# Patient Record
Sex: MALE | Race: AMERICAN INDIAN OR ALASKA NATIVE | NOT HISPANIC OR LATINO | Employment: UNEMPLOYED | URBAN - METROPOLITAN AREA
[De-identification: names, ages, dates, MRNs, and addresses within clinical notes are randomized per-mention and may not be internally consistent; named-entity substitution may affect disease eponyms.]

---

## 2022-01-01 ENCOUNTER — APPOINTMENT (OUTPATIENT)
Dept: RADIOLOGY | Facility: MEDICAL CENTER | Age: 37
DRG: 981 | End: 2022-01-01
Attending: EMERGENCY MEDICINE
Payer: MEDICAID

## 2022-01-01 ENCOUNTER — HOSPICE ADMISSION (OUTPATIENT)
Dept: HOSPICE | Facility: HOSPICE | Age: 37
End: 2022-01-01
Payer: OTHER GOVERNMENT

## 2022-01-01 ENCOUNTER — APPOINTMENT (OUTPATIENT)
Dept: RADIOLOGY | Facility: MEDICAL CENTER | Age: 37
DRG: 981 | End: 2022-01-01
Attending: INTERNAL MEDICINE
Payer: MEDICAID

## 2022-01-01 ENCOUNTER — APPOINTMENT (OUTPATIENT)
Dept: RADIOLOGY | Facility: MEDICAL CENTER | Age: 37
DRG: 981 | End: 2022-01-01
Attending: SURGERY
Payer: MEDICAID

## 2022-01-01 ENCOUNTER — HOME CARE VISIT (OUTPATIENT)
Dept: HOSPICE | Facility: HOSPICE | Age: 37
End: 2022-01-01
Payer: OTHER GOVERNMENT

## 2022-01-01 ENCOUNTER — APPOINTMENT (OUTPATIENT)
Dept: RADIOLOGY | Facility: MEDICAL CENTER | Age: 37
DRG: 981 | End: 2022-01-01
Attending: STUDENT IN AN ORGANIZED HEALTH CARE EDUCATION/TRAINING PROGRAM
Payer: MEDICAID

## 2022-01-01 ENCOUNTER — HOSPITAL ENCOUNTER (INPATIENT)
Facility: MEDICAL CENTER | Age: 37
LOS: 8 days | DRG: 981 | End: 2022-03-01
Attending: EMERGENCY MEDICINE | Admitting: INTERNAL MEDICINE
Payer: MEDICAID

## 2022-01-01 VITALS
HEIGHT: 72 IN | BODY MASS INDEX: 42.66 KG/M2 | OXYGEN SATURATION: 85 % | SYSTOLIC BLOOD PRESSURE: 109 MMHG | WEIGHT: 315 LBS | RESPIRATION RATE: 29 BRPM | HEART RATE: 94 BPM | TEMPERATURE: 97.3 F | DIASTOLIC BLOOD PRESSURE: 48 MMHG

## 2022-01-01 DIAGNOSIS — F10.20 ALCOHOLISM (HCC): ICD-10-CM

## 2022-01-01 DIAGNOSIS — E87.29 INCREASED ANION GAP METABOLIC ACIDOSIS: ICD-10-CM

## 2022-01-01 DIAGNOSIS — D64.9 ANEMIA, UNSPECIFIED TYPE: ICD-10-CM

## 2022-01-01 DIAGNOSIS — E80.6 HYPERBILIRUBINEMIA: ICD-10-CM

## 2022-01-01 DIAGNOSIS — N17.9 ACUTE RENAL FAILURE, UNSPECIFIED ACUTE RENAL FAILURE TYPE (HCC): ICD-10-CM

## 2022-01-01 DIAGNOSIS — N17.0 ACUTE RENAL FAILURE WITH TUBULAR NECROSIS (HCC): ICD-10-CM

## 2022-01-01 DIAGNOSIS — D72.829 LEUKOCYTOSIS, UNSPECIFIED TYPE: ICD-10-CM

## 2022-01-01 DIAGNOSIS — K74.60 DECOMPENSATED HEPATIC CIRRHOSIS (HCC): ICD-10-CM

## 2022-01-01 DIAGNOSIS — R74.01 ELEVATED TRANSAMINASE LEVEL: ICD-10-CM

## 2022-01-01 DIAGNOSIS — K76.82 HEPATIC ENCEPHALOPATHY (HCC): ICD-10-CM

## 2022-01-01 DIAGNOSIS — K92.1 GASTROINTESTINAL HEMORRHAGE WITH MELENA: ICD-10-CM

## 2022-01-01 DIAGNOSIS — F10.10 ALCOHOL ABUSE: ICD-10-CM

## 2022-01-01 DIAGNOSIS — K72.90 DECOMPENSATED HEPATIC CIRRHOSIS (HCC): ICD-10-CM

## 2022-01-01 LAB
ABO + RH BLD: NORMAL
ABO GROUP BLD: NORMAL
ALBUMIN SERPL BCP-MCNC: 2 G/DL (ref 3.2–4.9)
ALBUMIN SERPL BCP-MCNC: 2.1 G/DL (ref 3.2–4.9)
ALBUMIN SERPL BCP-MCNC: 2.3 G/DL (ref 3.2–4.9)
ALBUMIN SERPL BCP-MCNC: 2.6 G/DL (ref 3.2–4.9)
ALBUMIN SERPL BCP-MCNC: 2.7 G/DL (ref 3.2–4.9)
ALBUMIN SERPL BCP-MCNC: 2.7 G/DL (ref 3.2–4.9)
ALBUMIN SERPL BCP-MCNC: 2.8 G/DL (ref 3.2–4.9)
ALBUMIN SERPL BCP-MCNC: 3.2 G/DL (ref 3.2–4.9)
ALBUMIN SERPL BCP-MCNC: 3.3 G/DL (ref 3.2–4.9)
ALBUMIN/GLOB SERPL: 0.5 G/DL
ALBUMIN/GLOB SERPL: 0.5 G/DL
ALBUMIN/GLOB SERPL: 0.6 G/DL
ALBUMIN/GLOB SERPL: 0.7 G/DL
ALBUMIN/GLOB SERPL: 0.8 G/DL
ALBUMIN/GLOB SERPL: 0.9 G/DL
ALBUMIN/GLOB SERPL: 1.1 G/DL
ALP SERPL-CCNC: 148 U/L (ref 30–99)
ALP SERPL-CCNC: 158 U/L (ref 30–99)
ALP SERPL-CCNC: 161 U/L (ref 30–99)
ALP SERPL-CCNC: 162 U/L (ref 30–99)
ALP SERPL-CCNC: 175 U/L (ref 30–99)
ALP SERPL-CCNC: 220 U/L (ref 30–99)
ALP SERPL-CCNC: 229 U/L (ref 30–99)
ALT SERPL-CCNC: 50 U/L (ref 2–50)
ALT SERPL-CCNC: 59 U/L (ref 2–50)
ALT SERPL-CCNC: 70 U/L (ref 2–50)
ALT SERPL-CCNC: 71 U/L (ref 2–50)
ALT SERPL-CCNC: 73 U/L (ref 2–50)
ALT SERPL-CCNC: 75 U/L (ref 2–50)
ALT SERPL-CCNC: 75 U/L (ref 2–50)
ALT SERPL-CCNC: 79 U/L (ref 2–50)
ALT SERPL-CCNC: 97 U/L (ref 2–50)
AMMONIA PLAS-SCNC: 151 UMOL/L (ref 11–45)
AMORPH CRY #/AREA URNS HPF: PRESENT /HPF
ANION GAP SERPL CALC-SCNC: 15 MMOL/L (ref 7–16)
ANION GAP SERPL CALC-SCNC: 16 MMOL/L (ref 7–16)
ANION GAP SERPL CALC-SCNC: 16 MMOL/L (ref 7–16)
ANION GAP SERPL CALC-SCNC: 17 MMOL/L (ref 7–16)
ANION GAP SERPL CALC-SCNC: 17 MMOL/L (ref 7–16)
ANION GAP SERPL CALC-SCNC: 18 MMOL/L (ref 7–16)
ANION GAP SERPL CALC-SCNC: 18 MMOL/L (ref 7–16)
ANION GAP SERPL CALC-SCNC: 19 MMOL/L (ref 7–16)
ANION GAP SERPL CALC-SCNC: 19 MMOL/L (ref 7–16)
ANISOCYTOSIS BLD QL SMEAR: ABNORMAL
APPEARANCE UR: ABNORMAL
AST SERPL-CCNC: 114 U/L (ref 12–45)
AST SERPL-CCNC: 116 U/L (ref 12–45)
AST SERPL-CCNC: 135 U/L (ref 12–45)
AST SERPL-CCNC: 152 U/L (ref 12–45)
AST SERPL-CCNC: 153 U/L (ref 12–45)
AST SERPL-CCNC: 229 U/L (ref 12–45)
AST SERPL-CCNC: 229 U/L (ref 12–45)
AST SERPL-CCNC: 261 U/L (ref 12–45)
AST SERPL-CCNC: 288 U/L (ref 12–45)
BACTERIA #/AREA URNS HPF: NEGATIVE /HPF
BACTERIA BLD CULT: NORMAL
BACTERIA BLD CULT: NORMAL
BARCODED ABORH UBTYP: 1700
BARCODED ABORH UBTYP: 6200
BARCODED ABORH UBTYP: 6200
BARCODED ABORH UBTYP: 7300
BARCODED ABORH UBTYP: 8400
BARCODED ABORH UBTYP: 8400
BARCODED PRD CODE UBPRD: NORMAL
BARCODED UNIT NUM UBUNT: NORMAL
BASE EXCESS BLDA CALC-SCNC: -1 MMOL/L (ref -4–3)
BASE EXCESS BLDA CALC-SCNC: -2 MMOL/L (ref -4–3)
BASE EXCESS BLDA CALC-SCNC: -4 MMOL/L (ref -4–3)
BASE EXCESS BLDA CALC-SCNC: -7 MMOL/L (ref -4–3)
BASE EXCESS BLDA CALC-SCNC: 2 MMOL/L (ref -4–3)
BASE EXCESS BLDA CALC-SCNC: 3 MMOL/L (ref -4–3)
BASOPHILS # BLD AUTO: 0 % (ref 0–1.8)
BASOPHILS # BLD AUTO: 0.2 % (ref 0–1.8)
BASOPHILS # BLD AUTO: 0.3 % (ref 0–1.8)
BASOPHILS # BLD AUTO: 0.4 % (ref 0–1.8)
BASOPHILS # BLD AUTO: 0.5 % (ref 0–1.8)
BASOPHILS # BLD AUTO: 0.7 % (ref 0–1.8)
BASOPHILS # BLD AUTO: 0.9 % (ref 0–1.8)
BASOPHILS # BLD: 0 K/UL (ref 0–0.12)
BASOPHILS # BLD: 0.04 K/UL (ref 0–0.12)
BASOPHILS # BLD: 0.07 K/UL (ref 0–0.12)
BASOPHILS # BLD: 0.1 K/UL (ref 0–0.12)
BASOPHILS # BLD: 0.12 K/UL (ref 0–0.12)
BASOPHILS # BLD: 0.17 K/UL (ref 0–0.12)
BASOPHILS # BLD: 0.39 K/UL (ref 0–0.12)
BILIRUB SERPL-MCNC: 18.6 MG/DL (ref 0.1–1.5)
BILIRUB SERPL-MCNC: 19.2 MG/DL (ref 0.1–1.5)
BILIRUB SERPL-MCNC: 19.4 MG/DL (ref 0.1–1.5)
BILIRUB SERPL-MCNC: 22.2 MG/DL (ref 0.1–1.5)
BILIRUB SERPL-MCNC: 24.4 MG/DL (ref 0.1–1.5)
BILIRUB SERPL-MCNC: 26.4 MG/DL (ref 0.1–1.5)
BILIRUB SERPL-MCNC: 27 MG/DL (ref 0.1–1.5)
BILIRUB SERPL-MCNC: 31.2 MG/DL (ref 0.1–1.5)
BILIRUB SERPL-MCNC: 31.6 MG/DL (ref 0.1–1.5)
BILIRUB UR QL STRIP.AUTO: ABNORMAL
BLD GP AB SCN SERPL QL: NORMAL
BODY TEMPERATURE: ABNORMAL DEGREES
BREATHS SETTING VENT: 22
BREATHS SETTING VENT: 24
BUN SERPL-MCNC: 102 MG/DL (ref 8–22)
BUN SERPL-MCNC: 57 MG/DL (ref 8–22)
BUN SERPL-MCNC: 61 MG/DL (ref 8–22)
BUN SERPL-MCNC: 61 MG/DL (ref 8–22)
BUN SERPL-MCNC: 67 MG/DL (ref 8–22)
BUN SERPL-MCNC: 74 MG/DL (ref 8–22)
BUN SERPL-MCNC: 75 MG/DL (ref 8–22)
BUN SERPL-MCNC: 84 MG/DL (ref 8–22)
BUN SERPL-MCNC: 91 MG/DL (ref 8–22)
BUN SERPL-MCNC: 92 MG/DL (ref 8–22)
BUN SERPL-MCNC: 95 MG/DL (ref 8–22)
BUN SERPL-MCNC: 99 MG/DL (ref 8–22)
BURR CELLS BLD QL SMEAR: NORMAL
CA-I SERPL-SCNC: 1 MMOL/L (ref 1.1–1.3)
CALCIUM SERPL-MCNC: 8.1 MG/DL (ref 8.5–10.5)
CALCIUM SERPL-MCNC: 8.3 MG/DL (ref 8.5–10.5)
CALCIUM SERPL-MCNC: 8.4 MG/DL (ref 8.5–10.5)
CALCIUM SERPL-MCNC: 8.4 MG/DL (ref 8.5–10.5)
CALCIUM SERPL-MCNC: 8.6 MG/DL (ref 8.5–10.5)
CALCIUM SERPL-MCNC: 8.7 MG/DL (ref 8.5–10.5)
CALCIUM SERPL-MCNC: 8.8 MG/DL (ref 8.5–10.5)
CALCIUM SERPL-MCNC: 8.9 MG/DL (ref 8.5–10.5)
CFT BLD TEG: 12.5 MIN (ref 4.6–9.1)
CFT BLD TEG: 13.2 MIN (ref 4.6–9.1)
CFT BLD TEG: 13.7 MIN (ref 4.6–9.1)
CFT BLD TEG: 9.2 MIN (ref 4.6–9.1)
CFT BLD TEG: >17 MIN (ref 4.6–9.1)
CFT P HPASE BLD TEG: 10.8 MIN (ref 4.3–8.3)
CFT P HPASE BLD TEG: 12.6 MIN (ref 4.3–8.3)
CFT P HPASE BLD TEG: 12.7 MIN (ref 4.3–8.3)
CFT P HPASE BLD TEG: 13.7 MIN (ref 4.3–8.3)
CFT P HPASE BLD TEG: 14.5 MIN (ref 4.3–8.3)
CHLORIDE SERPL-SCNC: 100 MMOL/L (ref 96–112)
CHLORIDE SERPL-SCNC: 101 MMOL/L (ref 96–112)
CHLORIDE SERPL-SCNC: 101 MMOL/L (ref 96–112)
CHLORIDE SERPL-SCNC: 93 MMOL/L (ref 96–112)
CHLORIDE SERPL-SCNC: 94 MMOL/L (ref 96–112)
CHLORIDE SERPL-SCNC: 95 MMOL/L (ref 96–112)
CHLORIDE SERPL-SCNC: 96 MMOL/L (ref 96–112)
CHLORIDE SERPL-SCNC: 98 MMOL/L (ref 96–112)
CLOT ANGLE BLD TEG: 41 DEGREES (ref 63–78)
CLOT ANGLE BLD TEG: 72.1 DEGREES (ref 63–78)
CLOT ANGLE BLD TEG: 73.3 DEGREES (ref 63–78)
CLOT ANGLE BLD TEG: 74.1 DEGREES (ref 63–78)
CLOT ANGLE BLD TEG: 76.6 DEGREES (ref 63–78)
CLOT LYSIS 30M P MA LENFR BLD TEG: 0 % (ref 0–2.6)
CLOT LYSIS 30M P MA LENFR BLD TEG: 0.2 % (ref 0–2.6)
CO2 BLDA-SCNC: 19 MMOL/L (ref 20–33)
CO2 BLDA-SCNC: 24 MMOL/L (ref 20–33)
CO2 BLDA-SCNC: 26 MMOL/L (ref 20–33)
CO2 BLDA-SCNC: 27 MMOL/L (ref 20–33)
CO2 BLDA-SCNC: 28 MMOL/L (ref 20–33)
CO2 BLDA-SCNC: 31 MMOL/L (ref 20–33)
CO2 SERPL-SCNC: 16 MMOL/L (ref 20–33)
CO2 SERPL-SCNC: 16 MMOL/L (ref 20–33)
CO2 SERPL-SCNC: 18 MMOL/L (ref 20–33)
CO2 SERPL-SCNC: 18 MMOL/L (ref 20–33)
CO2 SERPL-SCNC: 19 MMOL/L (ref 20–33)
CO2 SERPL-SCNC: 19 MMOL/L (ref 20–33)
CO2 SERPL-SCNC: 21 MMOL/L (ref 20–33)
CO2 SERPL-SCNC: 21 MMOL/L (ref 20–33)
CO2 SERPL-SCNC: 22 MMOL/L (ref 20–33)
CO2 SERPL-SCNC: 23 MMOL/L (ref 20–33)
CO2 SERPL-SCNC: 23 MMOL/L (ref 20–33)
CO2 SERPL-SCNC: 25 MMOL/L (ref 20–33)
COLOR UR: ABNORMAL
COMMENT 1642: NORMAL
COMMENT 1642: NORMAL
COMPONENT CT 8504CT: NORMAL
COMPONENT CT 8504CT: NORMAL
COMPONENT F 8504F: NORMAL
CREAT SERPL-MCNC: 4.28 MG/DL (ref 0.5–1.4)
CREAT SERPL-MCNC: 5.32 MG/DL (ref 0.5–1.4)
CREAT SERPL-MCNC: 5.35 MG/DL (ref 0.5–1.4)
CREAT SERPL-MCNC: 5.6 MG/DL (ref 0.5–1.4)
CREAT SERPL-MCNC: 5.69 MG/DL (ref 0.5–1.4)
CREAT SERPL-MCNC: 5.69 MG/DL (ref 0.5–1.4)
CREAT SERPL-MCNC: 5.7 MG/DL (ref 0.5–1.4)
CREAT SERPL-MCNC: 5.79 MG/DL (ref 0.5–1.4)
CREAT SERPL-MCNC: 6.28 MG/DL (ref 0.5–1.4)
CREAT SERPL-MCNC: 6.36 MG/DL (ref 0.5–1.4)
CREAT SERPL-MCNC: 6.52 MG/DL (ref 0.5–1.4)
CREAT SERPL-MCNC: 8.11 MG/DL (ref 0.5–1.4)
CRP SERPL HS-MCNC: 4.06 MG/DL (ref 0–0.75)
CT.EXTRINSIC BLD ROTEM: 1 MIN (ref 0.8–2.1)
CT.EXTRINSIC BLD ROTEM: 1.2 MIN (ref 0.8–2.1)
CT.EXTRINSIC BLD ROTEM: 1.3 MIN (ref 0.8–2.1)
CT.EXTRINSIC BLD ROTEM: 1.3 MIN (ref 0.8–2.1)
CT.EXTRINSIC BLD ROTEM: 2.3 MIN (ref 0.8–2.1)
DELSYS IDSYS: ABNORMAL
EOSINOPHIL # BLD AUTO: 0 K/UL (ref 0–0.51)
EOSINOPHIL # BLD AUTO: 0.01 K/UL (ref 0–0.51)
EOSINOPHIL # BLD AUTO: 0.01 K/UL (ref 0–0.51)
EOSINOPHIL # BLD AUTO: 0.03 K/UL (ref 0–0.51)
EOSINOPHIL # BLD AUTO: 0.08 K/UL (ref 0–0.51)
EOSINOPHIL # BLD AUTO: 0.1 K/UL (ref 0–0.51)
EOSINOPHIL NFR BLD: 0 % (ref 0–6.9)
EOSINOPHIL NFR BLD: 0.1 % (ref 0–6.9)
EOSINOPHIL NFR BLD: 0.4 % (ref 0–6.9)
EOSINOPHIL NFR BLD: 0.4 % (ref 0–6.9)
EPI CELLS #/AREA URNS HPF: NEGATIVE /HPF
ERYTHROCYTE [DISTWIDTH] IN BLOOD BY AUTOMATED COUNT: 67.7 FL (ref 35.9–50)
ERYTHROCYTE [DISTWIDTH] IN BLOOD BY AUTOMATED COUNT: 68.1 FL (ref 35.9–50)
ERYTHROCYTE [DISTWIDTH] IN BLOOD BY AUTOMATED COUNT: 69 FL (ref 35.9–50)
ERYTHROCYTE [DISTWIDTH] IN BLOOD BY AUTOMATED COUNT: 70.6 FL (ref 35.9–50)
ERYTHROCYTE [DISTWIDTH] IN BLOOD BY AUTOMATED COUNT: 72 FL (ref 35.9–50)
ERYTHROCYTE [DISTWIDTH] IN BLOOD BY AUTOMATED COUNT: 72.2 FL (ref 35.9–50)
ERYTHROCYTE [DISTWIDTH] IN BLOOD BY AUTOMATED COUNT: 72.6 FL (ref 35.9–50)
ERYTHROCYTE [DISTWIDTH] IN BLOOD BY AUTOMATED COUNT: 74.9 FL (ref 35.9–50)
ERYTHROCYTE [DISTWIDTH] IN BLOOD BY AUTOMATED COUNT: 76.4 FL (ref 35.9–50)
ERYTHROCYTE [DISTWIDTH] IN BLOOD BY AUTOMATED COUNT: 79.4 FL (ref 35.9–50)
ETHANOL BLD-MCNC: <10.1 MG/DL (ref 0–10)
FIBRINOGEN PPP-MCNC: 191 MG/DL (ref 215–460)
FIBRINOGEN PPP-MCNC: 200 MG/DL (ref 215–460)
GLOBULIN SER CALC-MCNC: 3.1 G/DL (ref 1.9–3.5)
GLOBULIN SER CALC-MCNC: 3.4 G/DL (ref 1.9–3.5)
GLOBULIN SER CALC-MCNC: 3.6 G/DL (ref 1.9–3.5)
GLOBULIN SER CALC-MCNC: 3.8 G/DL (ref 1.9–3.5)
GLOBULIN SER CALC-MCNC: 3.9 G/DL (ref 1.9–3.5)
GLOBULIN SER CALC-MCNC: 4 G/DL (ref 1.9–3.5)
GLOBULIN SER CALC-MCNC: 4.4 G/DL (ref 1.9–3.5)
GLUCOSE SERPL-MCNC: 102 MG/DL (ref 65–99)
GLUCOSE SERPL-MCNC: 111 MG/DL (ref 65–99)
GLUCOSE SERPL-MCNC: 114 MG/DL (ref 65–99)
GLUCOSE SERPL-MCNC: 120 MG/DL (ref 65–99)
GLUCOSE SERPL-MCNC: 131 MG/DL (ref 65–99)
GLUCOSE SERPL-MCNC: 134 MG/DL (ref 65–99)
GLUCOSE SERPL-MCNC: 135 MG/DL (ref 65–99)
GLUCOSE SERPL-MCNC: 135 MG/DL (ref 65–99)
GLUCOSE SERPL-MCNC: 148 MG/DL (ref 65–99)
GLUCOSE SERPL-MCNC: 153 MG/DL (ref 65–99)
GLUCOSE SERPL-MCNC: 87 MG/DL (ref 65–99)
GLUCOSE SERPL-MCNC: 96 MG/DL (ref 65–99)
GLUCOSE UR STRIP.AUTO-MCNC: NEGATIVE MG/DL
GRAN CASTS #/AREA URNS LPF: ABNORMAL /LPF
HAV IGM SERPL QL IA: NORMAL
HBV CORE IGM SER QL: NORMAL
HBV SURFACE AB SERPL IA-ACNC: 746 MIU/ML (ref 0–10)
HBV SURFACE AG SER QL: NORMAL
HCO3 BLDA-SCNC: 18.1 MMOL/L (ref 17–25)
HCO3 BLDA-SCNC: 22.3 MMOL/L (ref 17–25)
HCO3 BLDA-SCNC: 24.3 MMOL/L (ref 17–25)
HCO3 BLDA-SCNC: 25.3 MMOL/L (ref 17–25)
HCO3 BLDA-SCNC: 26.9 MMOL/L (ref 17–25)
HCO3 BLDA-SCNC: 29.3 MMOL/L (ref 17–25)
HCT VFR BLD AUTO: 21.8 % (ref 42–52)
HCT VFR BLD AUTO: 22.3 % (ref 42–52)
HCT VFR BLD AUTO: 23 % (ref 42–52)
HCT VFR BLD AUTO: 23 % (ref 42–52)
HCT VFR BLD AUTO: 23.6 % (ref 42–52)
HCT VFR BLD AUTO: 24 % (ref 42–52)
HCT VFR BLD AUTO: 24.4 % (ref 42–52)
HCT VFR BLD AUTO: 27.3 % (ref 42–52)
HCT VFR BLD AUTO: 28.1 % (ref 42–52)
HCT VFR BLD AUTO: 31.3 % (ref 42–52)
HCV AB SER QL: NORMAL
HGB BLD-MCNC: 10.8 G/DL (ref 14–18)
HGB BLD-MCNC: 7.3 G/DL (ref 14–18)
HGB BLD-MCNC: 7.8 G/DL (ref 14–18)
HGB BLD-MCNC: 8 G/DL (ref 14–18)
HGB BLD-MCNC: 8 G/DL (ref 14–18)
HGB BLD-MCNC: 8.1 G/DL (ref 14–18)
HGB BLD-MCNC: 8.2 G/DL (ref 14–18)
HGB BLD-MCNC: 8.6 G/DL (ref 14–18)
HGB BLD-MCNC: 9.1 G/DL (ref 14–18)
HGB BLD-MCNC: 9.1 G/DL (ref 14–18)
HGB BLD-MCNC: 9.2 G/DL (ref 14–18)
HGB BLD-MCNC: 9.3 G/DL (ref 14–18)
HGB BLD-MCNC: 9.8 G/DL (ref 14–18)
HOROWITZ INDEX BLDA+IHG-RTO: 59 MM[HG]
HOROWITZ INDEX BLDA+IHG-RTO: 68 MM[HG]
HOROWITZ INDEX BLDA+IHG-RTO: 76 MM[HG]
HOROWITZ INDEX BLDA+IHG-RTO: 80 MM[HG]
HOROWITZ INDEX BLDA+IHG-RTO: 83 MM[HG]
HOROWITZ INDEX BLDA+IHG-RTO: 88 MM[HG]
HYALINE CASTS #/AREA URNS LPF: ABNORMAL /LPF
HYPOCHROMIA BLD QL SMEAR: ABNORMAL
IMM GRANULOCYTES # BLD AUTO: 0.29 K/UL (ref 0–0.11)
IMM GRANULOCYTES # BLD AUTO: 0.3 K/UL (ref 0–0.11)
IMM GRANULOCYTES # BLD AUTO: 0.32 K/UL (ref 0–0.11)
IMM GRANULOCYTES NFR BLD AUTO: 1.1 % (ref 0–0.9)
IMM GRANULOCYTES NFR BLD AUTO: 1.2 % (ref 0–0.9)
IMM GRANULOCYTES NFR BLD AUTO: 1.3 % (ref 0–0.9)
IMM GRANULOCYTES NFR BLD AUTO: 1.3 % (ref 0–0.9)
IMM GRANULOCYTES NFR BLD AUTO: 1.4 % (ref 0–0.9)
INR PPP: 1.61 (ref 0.87–1.13)
INR PPP: 1.65 (ref 0.87–1.13)
INR PPP: 1.67 (ref 0.87–1.13)
INR PPP: 1.69 (ref 0.87–1.13)
INR PPP: 1.74 (ref 0.87–1.13)
INR PPP: 1.75 (ref 0.87–1.13)
INR PPP: 1.78 (ref 0.87–1.13)
INR PPP: 1.96 (ref 0.87–1.13)
INR PPP: 2.01 (ref 0.87–1.13)
KETONES UR STRIP.AUTO-MCNC: NEGATIVE MG/DL
LACTATE BLD-SCNC: 2.1 MMOL/L (ref 0.5–2)
LEUKOCYTE ESTERASE UR QL STRIP.AUTO: ABNORMAL
LIPASE SERPL-CCNC: 31 U/L (ref 11–82)
LYMPHOCYTES # BLD AUTO: 1.05 K/UL (ref 1–4.8)
LYMPHOCYTES # BLD AUTO: 1.24 K/UL (ref 1–4.8)
LYMPHOCYTES # BLD AUTO: 1.56 K/UL (ref 1–4.8)
LYMPHOCYTES # BLD AUTO: 1.57 K/UL (ref 1–4.8)
LYMPHOCYTES # BLD AUTO: 1.6 K/UL (ref 1–4.8)
LYMPHOCYTES # BLD AUTO: 1.65 K/UL (ref 1–4.8)
LYMPHOCYTES # BLD AUTO: 1.68 K/UL (ref 1–4.8)
LYMPHOCYTES # BLD AUTO: 2.18 K/UL (ref 1–4.8)
LYMPHOCYTES # BLD AUTO: 2.42 K/UL (ref 1–4.8)
LYMPHOCYTES # BLD AUTO: 2.56 K/UL (ref 1–4.8)
LYMPHOCYTES NFR BLD: 3.8 % (ref 22–41)
LYMPHOCYTES NFR BLD: 4.4 % (ref 22–41)
LYMPHOCYTES NFR BLD: 5.3 % (ref 22–41)
LYMPHOCYTES NFR BLD: 5.3 % (ref 22–41)
LYMPHOCYTES NFR BLD: 6 % (ref 22–41)
LYMPHOCYTES NFR BLD: 6.1 % (ref 22–41)
LYMPHOCYTES NFR BLD: 6.3 % (ref 22–41)
LYMPHOCYTES NFR BLD: 6.4 % (ref 22–41)
LYMPHOCYTES NFR BLD: 7.2 % (ref 22–41)
LYMPHOCYTES NFR BLD: 7.3 % (ref 22–41)
MACROCYTES BLD QL SMEAR: ABNORMAL
MAGNESIUM SERPL-MCNC: 2.4 MG/DL (ref 1.5–2.5)
MAGNESIUM SERPL-MCNC: 2.4 MG/DL (ref 1.5–2.5)
MAGNESIUM SERPL-MCNC: 2.5 MG/DL (ref 1.5–2.5)
MAGNESIUM SERPL-MCNC: 2.7 MG/DL (ref 1.5–2.5)
MAGNESIUM SERPL-MCNC: 2.7 MG/DL (ref 1.5–2.5)
MAGNESIUM SERPL-MCNC: 2.9 MG/DL (ref 1.5–2.5)
MAGNESIUM SERPL-MCNC: 2.9 MG/DL (ref 1.5–2.5)
MAGNESIUM SERPL-MCNC: 3.1 MG/DL (ref 1.5–2.5)
MAGNESIUM SERPL-MCNC: 3.1 MG/DL (ref 1.5–2.5)
MANUAL DIFF BLD: NORMAL
MCF BLD TEG: 66.5 MM (ref 52–69)
MCF BLD TEG: 67.5 MM (ref 52–69)
MCF BLD TEG: 68.8 MM (ref 52–69)
MCF BLD TEG: 70.3 MM (ref 52–69)
MCF BLD TEG: 71 MM (ref 52–69)
MCF.PLATELET INHIB BLD ROTEM: 34.3 MM (ref 15–32)
MCF.PLATELET INHIB BLD ROTEM: 34.7 MM (ref 15–32)
MCF.PLATELET INHIB BLD ROTEM: 37.3 MM (ref 15–32)
MCF.PLATELET INHIB BLD ROTEM: 41 MM (ref 15–32)
MCF.PLATELET INHIB BLD ROTEM: 47.2 MM (ref 15–32)
MCH RBC QN AUTO: 33.4 PG (ref 27–33)
MCH RBC QN AUTO: 33.7 PG (ref 27–33)
MCH RBC QN AUTO: 33.9 PG (ref 27–33)
MCH RBC QN AUTO: 34.1 PG (ref 27–33)
MCH RBC QN AUTO: 34.2 PG (ref 27–33)
MCH RBC QN AUTO: 34.6 PG (ref 27–33)
MCH RBC QN AUTO: 34.6 PG (ref 27–33)
MCH RBC QN AUTO: 34.8 PG (ref 27–33)
MCH RBC QN AUTO: 34.9 PG (ref 27–33)
MCH RBC QN AUTO: 35.5 PG (ref 27–33)
MCHC RBC AUTO-ENTMCNC: 33.5 G/DL (ref 33.7–35.3)
MCHC RBC AUTO-ENTMCNC: 34.1 G/DL (ref 33.7–35.3)
MCHC RBC AUTO-ENTMCNC: 34.2 G/DL (ref 33.7–35.3)
MCHC RBC AUTO-ENTMCNC: 34.3 G/DL (ref 33.7–35.3)
MCHC RBC AUTO-ENTMCNC: 34.5 G/DL (ref 33.7–35.3)
MCHC RBC AUTO-ENTMCNC: 34.8 G/DL (ref 33.7–35.3)
MCHC RBC AUTO-ENTMCNC: 34.8 G/DL (ref 33.7–35.3)
MCHC RBC AUTO-ENTMCNC: 34.9 G/DL (ref 33.7–35.3)
MCHC RBC AUTO-ENTMCNC: 35 G/DL (ref 33.7–35.3)
MCHC RBC AUTO-ENTMCNC: 35.2 G/DL (ref 33.7–35.3)
MCV RBC AUTO: 100.4 FL (ref 81.4–97.8)
MCV RBC AUTO: 101.3 FL (ref 81.4–97.8)
MCV RBC AUTO: 101.3 FL (ref 81.4–97.8)
MCV RBC AUTO: 101.4 FL (ref 81.4–97.8)
MCV RBC AUTO: 103.3 FL (ref 81.4–97.8)
MCV RBC AUTO: 96.6 FL (ref 81.4–97.8)
MCV RBC AUTO: 96.8 FL (ref 81.4–97.8)
MCV RBC AUTO: 96.9 FL (ref 81.4–97.8)
MCV RBC AUTO: 98.3 FL (ref 81.4–97.8)
MCV RBC AUTO: 99.6 FL (ref 81.4–97.8)
METAMYELOCYTES NFR BLD MANUAL: 1.8 %
MICRO URNS: ABNORMAL
MICROCYTES BLD QL SMEAR: ABNORMAL
MODE IMODE: ABNORMAL
MONOCYTES # BLD AUTO: 1.32 K/UL (ref 0–0.85)
MONOCYTES # BLD AUTO: 1.7 K/UL (ref 0–0.85)
MONOCYTES # BLD AUTO: 1.8 K/UL (ref 0–0.85)
MONOCYTES # BLD AUTO: 1.81 K/UL (ref 0–0.85)
MONOCYTES # BLD AUTO: 2.01 K/UL (ref 0–0.85)
MONOCYTES # BLD AUTO: 2.09 K/UL (ref 0–0.85)
MONOCYTES # BLD AUTO: 2.14 K/UL (ref 0–0.85)
MONOCYTES # BLD AUTO: 2.2 K/UL (ref 0–0.85)
MONOCYTES # BLD AUTO: 2.8 K/UL (ref 0–0.85)
MONOCYTES # BLD AUTO: 3.33 K/UL (ref 0–0.85)
MONOCYTES NFR BLD AUTO: 4.4 % (ref 0–13.4)
MONOCYTES NFR BLD AUTO: 4.8 % (ref 0–13.4)
MONOCYTES NFR BLD AUTO: 5.1 % (ref 0–13.4)
MONOCYTES NFR BLD AUTO: 7.1 % (ref 0–13.4)
MONOCYTES NFR BLD AUTO: 7.8 % (ref 0–13.4)
MONOCYTES NFR BLD AUTO: 7.8 % (ref 0–13.4)
MONOCYTES NFR BLD AUTO: 8.2 % (ref 0–13.4)
MONOCYTES NFR BLD AUTO: 8.2 % (ref 0–13.4)
MONOCYTES NFR BLD AUTO: 8.9 % (ref 0–13.4)
MONOCYTES NFR BLD AUTO: 9.2 % (ref 0–13.4)
MORPHOLOGY BLD-IMP: NORMAL
MYELOCYTES NFR BLD MANUAL: 0.9 %
MYELOCYTES NFR BLD MANUAL: 1.7 %
NEUTROPHILS # BLD AUTO: 18.12 K/UL (ref 1.82–7.42)
NEUTROPHILS # BLD AUTO: 19.03 K/UL (ref 1.82–7.42)
NEUTROPHILS # BLD AUTO: 19.57 K/UL (ref 1.82–7.42)
NEUTROPHILS # BLD AUTO: 20.61 K/UL (ref 1.82–7.42)
NEUTROPHILS # BLD AUTO: 21.15 K/UL (ref 1.82–7.42)
NEUTROPHILS # BLD AUTO: 22.44 K/UL (ref 1.82–7.42)
NEUTROPHILS # BLD AUTO: 28.2 K/UL (ref 1.82–7.42)
NEUTROPHILS # BLD AUTO: 36.81 K/UL (ref 1.82–7.42)
NEUTROPHILS # BLD AUTO: 39.37 K/UL (ref 1.82–7.42)
NEUTROPHILS # BLD AUTO: 40.08 K/UL (ref 1.82–7.42)
NEUTROPHILS NFR BLD: 82.1 % (ref 44–72)
NEUTROPHILS NFR BLD: 82.2 % (ref 44–72)
NEUTROPHILS NFR BLD: 82.7 % (ref 44–72)
NEUTROPHILS NFR BLD: 83.2 % (ref 44–72)
NEUTROPHILS NFR BLD: 83.6 % (ref 44–72)
NEUTROPHILS NFR BLD: 84.5 % (ref 44–72)
NEUTROPHILS NFR BLD: 86.8 % (ref 44–72)
NEUTROPHILS NFR BLD: 87.3 % (ref 44–72)
NEUTROPHILS NFR BLD: 88.5 % (ref 44–72)
NEUTROPHILS NFR BLD: 90.5 % (ref 44–72)
NEUTS BAND NFR BLD MANUAL: 0.9 % (ref 0–10)
NEUTS BAND NFR BLD MANUAL: 2.6 % (ref 0–10)
NITRITE UR QL STRIP.AUTO: POSITIVE
NRBC # BLD AUTO: 0 K/UL
NRBC # BLD AUTO: 0.02 K/UL
NRBC # BLD AUTO: 0.04 K/UL
NRBC # BLD AUTO: 0.09 K/UL
NRBC # BLD AUTO: 0.17 K/UL
NRBC # BLD AUTO: 0.36 K/UL
NRBC BLD-RTO: 0 /100 WBC
NRBC BLD-RTO: 0.1 /100 WBC
NRBC BLD-RTO: 0.1 /100 WBC
NRBC BLD-RTO: 0.2 /100 WBC
NRBC BLD-RTO: 0.4 /100 WBC
NRBC BLD-RTO: 0.8 /100 WBC
O2/TOTAL GAS SETTING VFR VENT: 100 %
O2/TOTAL GAS SETTING VFR VENT: 80 %
OVALOCYTES BLD QL SMEAR: NORMAL
PA AA BLD-ACNC: 18.1 % (ref 0–11)
PA AA BLD-ACNC: 34.4 % (ref 0–11)
PA AA BLD-ACNC: 8.1 % (ref 0–11)
PA AA BLD-ACNC: 89 % (ref 0–11)
PA AA BLD-ACNC: ABNORMAL % (ref 0–11)
PA ADP BLD-ACNC: 47.6 % (ref 0–17)
PA ADP BLD-ACNC: 69.2 % (ref 0–17)
PA ADP BLD-ACNC: 96.4 % (ref 0–17)
PA ADP BLD-ACNC: 96.4 % (ref 0–17)
PA ADP BLD-ACNC: ABNORMAL % (ref 0–17)
PCO2 BLDA: 34 MMHG (ref 26–37)
PCO2 BLDA: 43.6 MMHG (ref 26–37)
PCO2 BLDA: 44.4 MMHG (ref 26–37)
PCO2 BLDA: 45.7 MMHG (ref 26–37)
PCO2 BLDA: 49.7 MMHG (ref 26–37)
PCO2 BLDA: 51.8 MMHG (ref 26–37)
PCO2 TEMP ADJ BLDA: 43 MMHG (ref 26–37)
PCO2 TEMP ADJ BLDA: 44.1 MMHG (ref 26–37)
PCO2 TEMP ADJ BLDA: 45.3 MMHG (ref 26–37)
PCO2 TEMP ADJ BLDA: 47.8 MMHG (ref 26–37)
PCO2 TEMP ADJ BLDA: 53.2 MMHG (ref 26–37)
PEEP END EXPIRATORY PRESSURE IPEEP: 12 CMH20
PEEP END EXPIRATORY PRESSURE IPEEP: 16 CMH20
PEEP END EXPIRATORY PRESSURE IPEEP: 16 CMH20
PEEP END EXPIRATORY PRESSURE IPEEP: 18 CMH20
PH BLDA: 7.31 [PH] (ref 7.4–7.5)
PH BLDA: 7.31 [PH] (ref 7.4–7.5)
PH BLDA: 7.33 [PH] (ref 7.4–7.5)
PH BLDA: 7.33 [PH] (ref 7.4–7.5)
PH BLDA: 7.36 [PH] (ref 7.4–7.5)
PH BLDA: 7.4 [PH] (ref 7.4–7.5)
PH TEMP ADJ BLDA: 7.32 [PH] (ref 7.4–7.5)
PH TEMP ADJ BLDA: 7.33 [PH] (ref 7.4–7.5)
PH TEMP ADJ BLDA: 7.34 [PH] (ref 7.4–7.5)
PH TEMP ADJ BLDA: 7.35 [PH] (ref 7.4–7.5)
PH TEMP ADJ BLDA: 7.39 [PH] (ref 7.4–7.5)
PH UR STRIP.AUTO: 5.5 [PH] (ref 5–8)
PHOSPHATE SERPL-MCNC: 10.5 MG/DL (ref 2.5–4.5)
PHOSPHATE SERPL-MCNC: 6.9 MG/DL (ref 2.5–4.5)
PHOSPHATE SERPL-MCNC: 7.2 MG/DL (ref 2.5–4.5)
PHOSPHATE SERPL-MCNC: 7.4 MG/DL (ref 2.5–4.5)
PHOSPHATE SERPL-MCNC: 7.9 MG/DL (ref 2.5–4.5)
PHOSPHATE SERPL-MCNC: 8.1 MG/DL (ref 2.5–4.5)
PHOSPHATE SERPL-MCNC: 8.2 MG/DL (ref 2.5–4.5)
PHOSPHATE SERPL-MCNC: 8.5 MG/DL (ref 2.5–4.5)
PHOSPHATE SERPL-MCNC: 9.5 MG/DL (ref 2.5–4.5)
PLATELET # BLD AUTO: 158 K/UL (ref 164–446)
PLATELET # BLD AUTO: 197 K/UL (ref 164–446)
PLATELET # BLD AUTO: 202 K/UL (ref 164–446)
PLATELET # BLD AUTO: 221 K/UL (ref 164–446)
PLATELET # BLD AUTO: 235 K/UL (ref 164–446)
PLATELET # BLD AUTO: 240 K/UL (ref 164–446)
PLATELET # BLD AUTO: 246 K/UL (ref 164–446)
PLATELET # BLD AUTO: 250 K/UL (ref 164–446)
PLATELET # BLD AUTO: 286 K/UL (ref 164–446)
PLATELET # BLD AUTO: 300 K/UL (ref 164–446)
PLATELET BLD QL SMEAR: NORMAL
PMV BLD AUTO: 10.6 FL (ref 9–12.9)
PMV BLD AUTO: 10.9 FL (ref 9–12.9)
PMV BLD AUTO: 11.1 FL (ref 9–12.9)
PMV BLD AUTO: 11.1 FL (ref 9–12.9)
PMV BLD AUTO: 11.3 FL (ref 9–12.9)
PMV BLD AUTO: 11.5 FL (ref 9–12.9)
PMV BLD AUTO: 11.5 FL (ref 9–12.9)
PMV BLD AUTO: 11.7 FL (ref 9–12.9)
PO2 BLDA: 54 MMHG (ref 64–87)
PO2 BLDA: 59 MMHG (ref 64–87)
PO2 BLDA: 61 MMHG (ref 64–87)
PO2 BLDA: 64 MMHG (ref 64–87)
PO2 BLDA: 66 MMHG (ref 64–87)
PO2 BLDA: 70 MMHG (ref 64–87)
PO2 TEMP ADJ BLDA: 51 MMHG (ref 64–87)
PO2 TEMP ADJ BLDA: 58 MMHG (ref 64–87)
PO2 TEMP ADJ BLDA: 62 MMHG (ref 64–87)
PO2 TEMP ADJ BLDA: 65 MMHG (ref 64–87)
PO2 TEMP ADJ BLDA: 69 MMHG (ref 64–87)
POIKILOCYTOSIS BLD QL SMEAR: NORMAL
POLYCHROMASIA BLD QL SMEAR: NORMAL
POLYCHROMASIA BLD QL SMEAR: NORMAL
POTASSIUM SERPL-SCNC: 3.6 MMOL/L (ref 3.6–5.5)
POTASSIUM SERPL-SCNC: 4 MMOL/L (ref 3.6–5.5)
POTASSIUM SERPL-SCNC: 4.1 MMOL/L (ref 3.6–5.5)
POTASSIUM SERPL-SCNC: 4.1 MMOL/L (ref 3.6–5.5)
POTASSIUM SERPL-SCNC: 4.3 MMOL/L (ref 3.6–5.5)
POTASSIUM SERPL-SCNC: 4.4 MMOL/L (ref 3.6–5.5)
POTASSIUM SERPL-SCNC: 4.5 MMOL/L (ref 3.6–5.5)
POTASSIUM SERPL-SCNC: 4.6 MMOL/L (ref 3.6–5.5)
POTASSIUM SERPL-SCNC: 5 MMOL/L (ref 3.6–5.5)
POTASSIUM SERPL-SCNC: 5.3 MMOL/L (ref 3.6–5.5)
PREALB SERPL-MCNC: 5.8 MG/DL (ref 18–38)
PREALB SERPL-MCNC: 8.7 MG/DL (ref 18–38)
PROCALCITONIN SERPL-MCNC: 3.27 NG/ML
PRODUCT TYPE UPROD: NORMAL
PROMYELOCYTES NFR BLD MANUAL: 0.9 %
PROT SERPL-MCNC: 6 G/DL (ref 6–8.2)
PROT SERPL-MCNC: 6.3 G/DL (ref 6–8.2)
PROT SERPL-MCNC: 6.3 G/DL (ref 6–8.2)
PROT SERPL-MCNC: 6.4 G/DL (ref 6–8.2)
PROT SERPL-MCNC: 6.5 G/DL (ref 6–8.2)
PROT SERPL-MCNC: 6.6 G/DL (ref 6–8.2)
PROT SERPL-MCNC: 6.8 G/DL (ref 6–8.2)
PROT UR QL STRIP: 30 MG/DL
PROTHROMBIN TIME: 18.7 SEC (ref 12–14.6)
PROTHROMBIN TIME: 19 SEC (ref 12–14.6)
PROTHROMBIN TIME: 19.2 SEC (ref 12–14.6)
PROTHROMBIN TIME: 19.4 SEC (ref 12–14.6)
PROTHROMBIN TIME: 19.8 SEC (ref 12–14.6)
PROTHROMBIN TIME: 19.9 SEC (ref 12–14.6)
PROTHROMBIN TIME: 20.2 SEC (ref 12–14.6)
PROTHROMBIN TIME: 21.7 SEC (ref 12–14.6)
PROTHROMBIN TIME: 22.2 SEC (ref 12–14.6)
RBC # BLD AUTO: 2.11 M/UL (ref 4.7–6.1)
RBC # BLD AUTO: 2.2 M/UL (ref 4.7–6.1)
RBC # BLD AUTO: 2.29 M/UL (ref 4.7–6.1)
RBC # BLD AUTO: 2.33 M/UL (ref 4.7–6.1)
RBC # BLD AUTO: 2.34 M/UL (ref 4.7–6.1)
RBC # BLD AUTO: 2.37 M/UL (ref 4.7–6.1)
RBC # BLD AUTO: 2.52 M/UL (ref 4.7–6.1)
RBC # BLD AUTO: 2.74 M/UL (ref 4.7–6.1)
RBC # BLD AUTO: 2.91 M/UL (ref 4.7–6.1)
RBC # BLD AUTO: 3.23 M/UL (ref 4.7–6.1)
RBC # URNS HPF: ABNORMAL /HPF
RBC BLD AUTO: PRESENT
RBC UR QL AUTO: NEGATIVE
RH BLD: NORMAL
SAO2 % BLDA: 85 % (ref 93–99)
SAO2 % BLDA: 89 % (ref 93–99)
SAO2 % BLDA: 89 % (ref 93–99)
SAO2 % BLDA: 92 % (ref 93–99)
SCHISTOCYTES BLD QL SMEAR: NORMAL
SIGNIFICANT IND 70042: NORMAL
SIGNIFICANT IND 70042: NORMAL
SITE SITE: NORMAL
SITE SITE: NORMAL
SODIUM SERPL-SCNC: 134 MMOL/L (ref 135–145)
SODIUM SERPL-SCNC: 135 MMOL/L (ref 135–145)
SODIUM SERPL-SCNC: 136 MMOL/L (ref 135–145)
SOURCE SOURCE: NORMAL
SOURCE SOURCE: NORMAL
SP GR UR STRIP.AUTO: 1.01
SPECIMEN DRAWN FROM PATIENT: ABNORMAL
TARGETS BLD QL SMEAR: NORMAL
TEG ALGORITHM TGALG: ABNORMAL
TIDAL VOLUME IVT: 430 ML
TRIGL SERPL-MCNC: 150 MG/DL (ref 0–149)
TRIGL SERPL-MCNC: 184 MG/DL (ref 0–149)
TRIGL SERPL-MCNC: 204 MG/DL (ref 0–149)
TRIGL SERPL-MCNC: 456 MG/DL (ref 0–149)
UNIT STATUS USTAT: NORMAL
UROBILINOGEN UR STRIP.AUTO-MCNC: 0.2 MG/DL
WBC # BLD AUTO: 22 K/UL (ref 4.8–10.8)
WBC # BLD AUTO: 23.2 K/UL (ref 4.8–10.8)
WBC # BLD AUTO: 23.2 K/UL (ref 4.8–10.8)
WBC # BLD AUTO: 23.9 K/UL (ref 4.8–10.8)
WBC # BLD AUTO: 24.8 K/UL (ref 4.8–10.8)
WBC # BLD AUTO: 25.7 K/UL (ref 4.8–10.8)
WBC # BLD AUTO: 34.1 K/UL (ref 4.8–10.8)
WBC # BLD AUTO: 42.7 K/UL (ref 4.8–10.8)
WBC # BLD AUTO: 43.5 K/UL (ref 4.8–10.8)
WBC # BLD AUTO: 45.7 K/UL (ref 4.8–10.8)
WBC #/AREA URNS HPF: ABNORMAL /HPF

## 2022-01-01 PROCEDURE — 700111 HCHG RX REV CODE 636 W/ 250 OVERRIDE (IP): Performed by: INTERNAL MEDICINE

## 2022-01-01 PROCEDURE — 700102 HCHG RX REV CODE 250 W/ 637 OVERRIDE(OP): Performed by: INTERNAL MEDICINE

## 2022-01-01 PROCEDURE — 94003 VENT MGMT INPAT SUBQ DAY: CPT

## 2022-01-01 PROCEDURE — 99291 CRITICAL CARE FIRST HOUR: CPT | Mod: 25 | Performed by: STUDENT IN AN ORGANIZED HEALTH CARE EDUCATION/TRAINING PROGRAM

## 2022-01-01 PROCEDURE — 02HV33Z INSERTION OF INFUSION DEVICE INTO SUPERIOR VENA CAVA, PERCUTANEOUS APPROACH: ICD-10-PCS | Performed by: STUDENT IN AN ORGANIZED HEALTH CARE EDUCATION/TRAINING PROGRAM

## 2022-01-01 PROCEDURE — 160202 HCHG ENDO MINUTES - 1ST 30 MINS LEVEL 3: Performed by: INTERNAL MEDICINE

## 2022-01-01 PROCEDURE — 96375 TX/PRO/DX INJ NEW DRUG ADDON: CPT

## 2022-01-01 PROCEDURE — P9017 PLASMA 1 DONOR FRZ W/IN 8 HR: HCPCS

## 2022-01-01 PROCEDURE — 700105 HCHG RX REV CODE 258: Performed by: INTERNAL MEDICINE

## 2022-01-01 PROCEDURE — 94760 N-INVAS EAR/PLS OXIMETRY 1: CPT

## 2022-01-01 PROCEDURE — 302307 BAG FECAL MANAGEMENT TEMPORARY COLLECTION WITH FILTER - SEAL SIGNAL FMS: Performed by: STUDENT IN AN ORGANIZED HEALTH CARE EDUCATION/TRAINING PROGRAM

## 2022-01-01 PROCEDURE — 94002 VENT MGMT INPAT INIT DAY: CPT

## 2022-01-01 PROCEDURE — 700105 HCHG RX REV CODE 258: Performed by: STUDENT IN AN ORGANIZED HEALTH CARE EDUCATION/TRAINING PROGRAM

## 2022-01-01 PROCEDURE — 83735 ASSAY OF MAGNESIUM: CPT

## 2022-01-01 PROCEDURE — 700102 HCHG RX REV CODE 250 W/ 637 OVERRIDE(OP): Performed by: STUDENT IN AN ORGANIZED HEALTH CARE EDUCATION/TRAINING PROGRAM

## 2022-01-01 PROCEDURE — 700111 HCHG RX REV CODE 636 W/ 250 OVERRIDE (IP): Performed by: STUDENT IN AN ORGANIZED HEALTH CARE EDUCATION/TRAINING PROGRAM

## 2022-01-01 PROCEDURE — C1751 CATH, INF, PER/CENT/MIDLINE: HCPCS

## 2022-01-01 PROCEDURE — 700101 HCHG RX REV CODE 250

## 2022-01-01 PROCEDURE — C9113 INJ PANTOPRAZOLE SODIUM, VIA: HCPCS | Performed by: INTERNAL MEDICINE

## 2022-01-01 PROCEDURE — 71045 X-RAY EXAM CHEST 1 VIEW: CPT

## 2022-01-01 PROCEDURE — 700101 HCHG RX REV CODE 250: Performed by: STUDENT IN AN ORGANIZED HEALTH CARE EDUCATION/TRAINING PROGRAM

## 2022-01-01 PROCEDURE — 700111 HCHG RX REV CODE 636 W/ 250 OVERRIDE (IP)

## 2022-01-01 PROCEDURE — 90935 HEMODIALYSIS ONE EVALUATION: CPT

## 2022-01-01 PROCEDURE — 99292 CRITICAL CARE ADDL 30 MIN: CPT | Mod: 25 | Performed by: STUDENT IN AN ORGANIZED HEALTH CARE EDUCATION/TRAINING PROGRAM

## 2022-01-01 PROCEDURE — 0BH18EZ INSERTION OF ENDOTRACHEAL AIRWAY INTO TRACHEA, VIA NATURAL OR ARTIFICIAL OPENING ENDOSCOPIC: ICD-10-PCS | Performed by: STUDENT IN AN ORGANIZED HEALTH CARE EDUCATION/TRAINING PROGRAM

## 2022-01-01 PROCEDURE — 31645 BRNCHSC W/THER ASPIR 1ST: CPT | Performed by: STUDENT IN AN ORGANIZED HEALTH CARE EDUCATION/TRAINING PROGRAM

## 2022-01-01 PROCEDURE — P9012 CRYOPRECIPITATE EACH UNIT: HCPCS | Mod: 91

## 2022-01-01 PROCEDURE — 770022 HCHG ROOM/CARE - ICU (200)

## 2022-01-01 PROCEDURE — 85610 PROTHROMBIN TIME: CPT

## 2022-01-01 PROCEDURE — 85384 FIBRINOGEN ACTIVITY: CPT

## 2022-01-01 PROCEDURE — 82180 ASSAY OF ASCORBIC ACID: CPT

## 2022-01-01 PROCEDURE — 85027 COMPLETE CBC AUTOMATED: CPT

## 2022-01-01 PROCEDURE — 99292 CRITICAL CARE ADDL 30 MIN: CPT | Performed by: INTERNAL MEDICINE

## 2022-01-01 PROCEDURE — 84478 ASSAY OF TRIGLYCERIDES: CPT

## 2022-01-01 PROCEDURE — 0B968ZZ DRAINAGE OF RIGHT LOWER LOBE BRONCHUS, VIA NATURAL OR ARTIFICIAL OPENING ENDOSCOPIC: ICD-10-PCS | Performed by: STUDENT IN AN ORGANIZED HEALTH CARE EDUCATION/TRAINING PROGRAM

## 2022-01-01 PROCEDURE — 85007 BL SMEAR W/DIFF WBC COUNT: CPT

## 2022-01-01 PROCEDURE — 85025 COMPLETE CBC W/AUTO DIFF WBC: CPT

## 2022-01-01 PROCEDURE — 85347 COAGULATION TIME ACTIVATED: CPT

## 2022-01-01 PROCEDURE — 51798 US URINE CAPACITY MEASURE: CPT

## 2022-01-01 PROCEDURE — A9270 NON-COVERED ITEM OR SERVICE: HCPCS | Performed by: STUDENT IN AN ORGANIZED HEALTH CARE EDUCATION/TRAINING PROGRAM

## 2022-01-01 PROCEDURE — 84100 ASSAY OF PHOSPHORUS: CPT

## 2022-01-01 PROCEDURE — 0DJ08ZZ INSPECTION OF UPPER INTESTINAL TRACT, VIA NATURAL OR ARTIFICIAL OPENING ENDOSCOPIC: ICD-10-PCS | Performed by: INTERNAL MEDICINE

## 2022-01-01 PROCEDURE — 80053 COMPREHEN METABOLIC PANEL: CPT

## 2022-01-01 PROCEDURE — 86706 HEP B SURFACE ANTIBODY: CPT

## 2022-01-01 PROCEDURE — 36430 TRANSFUSION BLD/BLD COMPNT: CPT

## 2022-01-01 PROCEDURE — 83690 ASSAY OF LIPASE: CPT

## 2022-01-01 PROCEDURE — 96374 THER/PROPH/DIAG INJ IV PUSH: CPT

## 2022-01-01 PROCEDURE — 99291 CRITICAL CARE FIRST HOUR: CPT

## 2022-01-01 PROCEDURE — 82803 BLOOD GASES ANY COMBINATION: CPT

## 2022-01-01 PROCEDURE — 86850 RBC ANTIBODY SCREEN: CPT

## 2022-01-01 PROCEDURE — 36600 WITHDRAWAL OF ARTERIAL BLOOD: CPT

## 2022-01-01 PROCEDURE — C9113 INJ PANTOPRAZOLE SODIUM, VIA: HCPCS | Performed by: EMERGENCY MEDICINE

## 2022-01-01 PROCEDURE — 82077 ASSAY SPEC XCP UR&BREATH IA: CPT

## 2022-01-01 PROCEDURE — 700101 HCHG RX REV CODE 250: Performed by: OTOLARYNGOLOGY

## 2022-01-01 PROCEDURE — 82140 ASSAY OF AMMONIA: CPT

## 2022-01-01 PROCEDURE — 160048 HCHG OR STATISTICAL LEVEL 1-5: Performed by: INTERNAL MEDICINE

## 2022-01-01 PROCEDURE — 85576 BLOOD PLATELET AGGREGATION: CPT

## 2022-01-01 PROCEDURE — 82330 ASSAY OF CALCIUM: CPT

## 2022-01-01 PROCEDURE — 80048 BASIC METABOLIC PNL TOTAL CA: CPT | Mod: 91

## 2022-01-01 PROCEDURE — 99152 MOD SED SAME PHYS/QHP 5/>YRS: CPT

## 2022-01-01 PROCEDURE — 3E033XZ INTRODUCTION OF VASOPRESSOR INTO PERIPHERAL VEIN, PERCUTANEOUS APPROACH: ICD-10-PCS | Performed by: STUDENT IN AN ORGANIZED HEALTH CARE EDUCATION/TRAINING PROGRAM

## 2022-01-01 PROCEDURE — 80048 BASIC METABOLIC PNL TOTAL CA: CPT

## 2022-01-01 PROCEDURE — 99291 CRITICAL CARE FIRST HOUR: CPT | Performed by: STUDENT IN AN ORGANIZED HEALTH CARE EDUCATION/TRAINING PROGRAM

## 2022-01-01 PROCEDURE — 31500 INSERT EMERGENCY AIRWAY: CPT

## 2022-01-01 PROCEDURE — 31624 DX BRONCHOSCOPE/LAVAGE: CPT | Mod: 51 | Performed by: STUDENT IN AN ORGANIZED HEALTH CARE EDUCATION/TRAINING PROGRAM

## 2022-01-01 PROCEDURE — 0B9B8ZZ DRAINAGE OF LEFT LOWER LOBE BRONCHUS, VIA NATURAL OR ARTIFICIAL OPENING ENDOSCOPIC: ICD-10-PCS | Performed by: STUDENT IN AN ORGANIZED HEALTH CARE EDUCATION/TRAINING PROGRAM

## 2022-01-01 PROCEDURE — 86140 C-REACTIVE PROTEIN: CPT

## 2022-01-01 PROCEDURE — 30233M1 TRANSFUSION OF NONAUTOLOGOUS PLASMA CRYOPRECIPITATE INTO PERIPHERAL VEIN, PERCUTANEOUS APPROACH: ICD-10-PCS | Performed by: STUDENT IN AN ORGANIZED HEALTH CARE EDUCATION/TRAINING PROGRAM

## 2022-01-01 PROCEDURE — 0B9F8ZZ DRAINAGE OF RIGHT LOWER LUNG LOBE, VIA NATURAL OR ARTIFICIAL OPENING ENDOSCOPIC: ICD-10-PCS | Performed by: STUDENT IN AN ORGANIZED HEALTH CARE EDUCATION/TRAINING PROGRAM

## 2022-01-01 PROCEDURE — 87040 BLOOD CULTURE FOR BACTERIA: CPT | Mod: 91

## 2022-01-01 PROCEDURE — 302977 HCHG BRONCHOSCOPY PROC-THERAPEUTIC

## 2022-01-01 PROCEDURE — 84145 PROCALCITONIN (PCT): CPT

## 2022-01-01 PROCEDURE — 84134 ASSAY OF PREALBUMIN: CPT

## 2022-01-01 PROCEDURE — 85018 HEMOGLOBIN: CPT

## 2022-01-01 PROCEDURE — 83605 ASSAY OF LACTIC ACID: CPT

## 2022-01-01 PROCEDURE — 86901 BLOOD TYPING SEROLOGIC RH(D): CPT

## 2022-01-01 PROCEDURE — 31500 INSERT EMERGENCY AIRWAY: CPT | Performed by: STUDENT IN AN ORGANIZED HEALTH CARE EDUCATION/TRAINING PROGRAM

## 2022-01-01 PROCEDURE — 80074 ACUTE HEPATITIS PANEL: CPT

## 2022-01-01 PROCEDURE — 5A1955Z RESPIRATORY VENTILATION, GREATER THAN 96 CONSECUTIVE HOURS: ICD-10-PCS | Performed by: STUDENT IN AN ORGANIZED HEALTH CARE EDUCATION/TRAINING PROGRAM

## 2022-01-01 PROCEDURE — 99291 CRITICAL CARE FIRST HOUR: CPT | Performed by: INTERNAL MEDICINE

## 2022-01-01 PROCEDURE — A9270 NON-COVERED ITEM OR SERVICE: HCPCS | Performed by: INTERNAL MEDICINE

## 2022-01-01 PROCEDURE — 76700 US EXAM ABDOM COMPLETE: CPT

## 2022-01-01 PROCEDURE — P9047 ALBUMIN (HUMAN), 25%, 50ML: HCPCS | Mod: JG | Performed by: INTERNAL MEDICINE

## 2022-01-01 PROCEDURE — 99153 MOD SED SAME PHYS/QHP EA: CPT

## 2022-01-01 PROCEDURE — 700101 HCHG RX REV CODE 250: Performed by: INTERNAL MEDICINE

## 2022-01-01 PROCEDURE — 700111 HCHG RX REV CODE 636 W/ 250 OVERRIDE (IP): Performed by: EMERGENCY MEDICINE

## 2022-01-01 PROCEDURE — P9017 PLASMA 1 DONOR FRZ W/IN 8 HR: HCPCS | Mod: 91

## 2022-01-01 PROCEDURE — 85576 BLOOD PLATELET AGGREGATION: CPT | Mod: 91

## 2022-01-01 PROCEDURE — 5A1D70Z PERFORMANCE OF URINARY FILTRATION, INTERMITTENT, LESS THAN 6 HOURS PER DAY: ICD-10-PCS | Performed by: STUDENT IN AN ORGANIZED HEALTH CARE EDUCATION/TRAINING PROGRAM

## 2022-01-01 PROCEDURE — 700111 HCHG RX REV CODE 636 W/ 250 OVERRIDE (IP): Mod: JG | Performed by: INTERNAL MEDICINE

## 2022-01-01 PROCEDURE — 99292 CRITICAL CARE ADDL 30 MIN: CPT | Performed by: STUDENT IN AN ORGANIZED HEALTH CARE EDUCATION/TRAINING PROGRAM

## 2022-01-01 PROCEDURE — 86900 BLOOD TYPING SEROLOGIC ABO: CPT

## 2022-01-01 PROCEDURE — 302136 NUTRITION PUMP: Performed by: STUDENT IN AN ORGANIZED HEALTH CARE EDUCATION/TRAINING PROGRAM

## 2022-01-01 PROCEDURE — 700102 HCHG RX REV CODE 250 W/ 637 OVERRIDE(OP): Performed by: OTOLARYNGOLOGY

## 2022-01-01 PROCEDURE — 700111 HCHG RX REV CODE 636 W/ 250 OVERRIDE (IP): Mod: JA | Performed by: INTERNAL MEDICINE

## 2022-01-01 PROCEDURE — C1752 CATH,HEMODIALYSIS,SHORT-TERM: HCPCS

## 2022-01-01 PROCEDURE — 700105 HCHG RX REV CODE 258

## 2022-01-01 PROCEDURE — 85025 COMPLETE CBC W/AUTO DIFF WBC: CPT | Mod: 91

## 2022-01-01 PROCEDURE — 0B9C8ZZ DRAINAGE OF RIGHT UPPER LUNG LOBE, VIA NATURAL OR ARTIFICIAL OPENING ENDOSCOPIC: ICD-10-PCS | Performed by: STUDENT IN AN ORGANIZED HEALTH CARE EDUCATION/TRAINING PROGRAM

## 2022-01-01 PROCEDURE — 0WCQ8ZZ EXTIRPATION OF MATTER FROM RESPIRATORY TRACT, VIA NATURAL OR ARTIFICIAL OPENING ENDOSCOPIC: ICD-10-PCS | Performed by: STUDENT IN AN ORGANIZED HEALTH CARE EDUCATION/TRAINING PROGRAM

## 2022-01-01 PROCEDURE — 36556 INSERT NON-TUNNEL CV CATH: CPT

## 2022-01-01 PROCEDURE — 30233L1 TRANSFUSION OF NONAUTOLOGOUS FRESH PLASMA INTO PERIPHERAL VEIN, PERCUTANEOUS APPROACH: ICD-10-PCS | Performed by: INTERNAL MEDICINE

## 2022-01-01 PROCEDURE — 302307 BAG FECAL MANAGEMENT TEMPORARY COLLECTION WITH FILTER - SEAL SIGNAL FMS: Performed by: INTERNAL MEDICINE

## 2022-01-01 PROCEDURE — 81001 URINALYSIS AUTO W/SCOPE: CPT

## 2022-01-01 PROCEDURE — 36556 INSERT NON-TUNNEL CV CATH: CPT | Mod: RT | Performed by: STUDENT IN AN ORGANIZED HEALTH CARE EDUCATION/TRAINING PROGRAM

## 2022-01-01 PROCEDURE — 0B9D8ZZ DRAINAGE OF RIGHT MIDDLE LUNG LOBE, VIA NATURAL OR ARTIFICIAL OPENING ENDOSCOPIC: ICD-10-PCS | Performed by: STUDENT IN AN ORGANIZED HEALTH CARE EDUCATION/TRAINING PROGRAM

## 2022-01-01 PROCEDURE — A9270 NON-COVERED ITEM OR SERVICE: HCPCS | Performed by: OTOLARYNGOLOGY

## 2022-01-01 RX ORDER — TRANEXAMIC ACID 100 MG/ML
1000 INJECTION, SOLUTION INTRAVENOUS ONCE
Status: DISPENSED | OUTPATIENT
Start: 2022-01-01 | End: 2022-01-01

## 2022-01-01 RX ORDER — TRANEXAMIC ACID 100 MG/ML
1000 INJECTION, SOLUTION INTRAVENOUS ONCE
Status: COMPLETED | OUTPATIENT
Start: 2022-01-01 | End: 2022-01-01

## 2022-01-01 RX ORDER — NYSTATIN 100000 [USP'U]/G
POWDER TOPICAL 3 TIMES DAILY
Status: DISPENSED | OUTPATIENT
Start: 2022-01-01 | End: 2022-01-01

## 2022-01-01 RX ORDER — ROCURONIUM BROMIDE 10 MG/ML
100 INJECTION, SOLUTION INTRAVENOUS
Status: DISCONTINUED | OUTPATIENT
Start: 2022-01-01 | End: 2022-01-01

## 2022-01-01 RX ORDER — BISACODYL 10 MG
10 SUPPOSITORY, RECTAL RECTAL
Status: DISCONTINUED | OUTPATIENT
Start: 2022-01-01 | End: 2022-01-01

## 2022-01-01 RX ORDER — PROMETHAZINE HYDROCHLORIDE 25 MG/1
12.5-25 SUPPOSITORY RECTAL EVERY 4 HOURS PRN
Status: DISCONTINUED | OUTPATIENT
Start: 2022-01-01 | End: 2022-01-01

## 2022-01-01 RX ORDER — SODIUM BICARBONATE IN D5W 150/1000ML
PLASTIC BAG, INJECTION (ML) INTRAVENOUS CONTINUOUS
Status: DISCONTINUED | OUTPATIENT
Start: 2022-01-01 | End: 2022-01-01

## 2022-01-01 RX ORDER — ALBUMIN (HUMAN) 12.5 G/50ML
25 SOLUTION INTRAVENOUS EVERY 6 HOURS
Status: COMPLETED | OUTPATIENT
Start: 2022-01-01 | End: 2022-01-01

## 2022-01-01 RX ORDER — ONDANSETRON 2 MG/ML
4 INJECTION INTRAMUSCULAR; INTRAVENOUS ONCE
Status: COMPLETED | OUTPATIENT
Start: 2022-01-01 | End: 2022-01-01

## 2022-01-01 RX ORDER — PROMETHAZINE HYDROCHLORIDE 25 MG/1
12.5-25 TABLET ORAL EVERY 4 HOURS PRN
Status: DISCONTINUED | OUTPATIENT
Start: 2022-01-01 | End: 2022-01-01

## 2022-01-01 RX ORDER — FOLIC ACID 1 MG/1
1 TABLET ORAL EVERY EVENING
Status: DISCONTINUED | OUTPATIENT
Start: 2022-01-01 | End: 2022-01-01

## 2022-01-01 RX ORDER — HEPARIN SODIUM 5000 [USP'U]/ML
5000 INJECTION, SOLUTION INTRAVENOUS; SUBCUTANEOUS EVERY 8 HOURS
Status: DISCONTINUED | OUTPATIENT
Start: 2022-01-01 | End: 2022-01-01

## 2022-01-01 RX ORDER — GLYCOPYRROLATE 1 MG/1
1 TABLET ORAL 3 TIMES DAILY PRN
Status: DISCONTINUED | OUTPATIENT
Start: 2022-01-01 | End: 2022-01-01 | Stop reason: HOSPADM

## 2022-01-01 RX ORDER — GLYCOPYRROLATE 0.2 MG/ML
0.2 INJECTION INTRAMUSCULAR; INTRAVENOUS 3 TIMES DAILY PRN
Status: DISCONTINUED | OUTPATIENT
Start: 2022-01-01 | End: 2022-01-01 | Stop reason: HOSPADM

## 2022-01-01 RX ORDER — PANTOPRAZOLE SODIUM 40 MG/10ML
80 INJECTION, POWDER, LYOPHILIZED, FOR SOLUTION INTRAVENOUS ONCE
Status: COMPLETED | OUTPATIENT
Start: 2022-01-01 | End: 2022-01-01

## 2022-01-01 RX ORDER — BACITRACIN ZINC AND POLYMYXIN B SULFATE 500; 1000 [USP'U]/G; [USP'U]/G
OINTMENT TOPICAL ONCE
Status: COMPLETED | OUTPATIENT
Start: 2022-01-01 | End: 2022-01-01

## 2022-01-01 RX ORDER — FOLIC ACID 1 MG/1
1 TABLET ORAL EVERY EVENING
Status: COMPLETED | OUTPATIENT
Start: 2022-01-01 | End: 2022-01-01

## 2022-01-01 RX ORDER — ONDANSETRON 2 MG/ML
4 INJECTION INTRAMUSCULAR; INTRAVENOUS EVERY 4 HOURS PRN
Status: DISCONTINUED | OUTPATIENT
Start: 2022-01-01 | End: 2022-01-01

## 2022-01-01 RX ORDER — ONDANSETRON 4 MG/1
8 TABLET, ORALLY DISINTEGRATING ORAL EVERY 8 HOURS PRN
Status: DISCONTINUED | OUTPATIENT
Start: 2022-01-01 | End: 2022-01-01 | Stop reason: HOSPADM

## 2022-01-01 RX ORDER — LORAZEPAM 2 MG/ML
1 CONCENTRATE ORAL
Status: DISCONTINUED | OUTPATIENT
Start: 2022-01-01 | End: 2022-01-01 | Stop reason: HOSPADM

## 2022-01-01 RX ORDER — MORPHINE SULFATE 10 MG/ML
5 INJECTION, SOLUTION INTRAMUSCULAR; INTRAVENOUS
Status: DISCONTINUED | OUTPATIENT
Start: 2022-01-01 | End: 2022-01-01 | Stop reason: HOSPADM

## 2022-01-01 RX ORDER — ONDANSETRON 2 MG/ML
INJECTION INTRAMUSCULAR; INTRAVENOUS
Status: COMPLETED
Start: 2022-01-01 | End: 2022-01-01

## 2022-01-01 RX ORDER — ATROPINE SULFATE 10 MG/ML
2 SOLUTION/ DROPS OPHTHALMIC EVERY 4 HOURS PRN
Status: DISCONTINUED | OUTPATIENT
Start: 2022-01-01 | End: 2022-01-01 | Stop reason: HOSPADM

## 2022-01-01 RX ORDER — HEPARIN SODIUM 1000 [USP'U]/ML
2400 INJECTION, SOLUTION INTRAVENOUS; SUBCUTANEOUS PRN
Status: DISCONTINUED | OUTPATIENT
Start: 2022-01-01 | End: 2022-01-01

## 2022-01-01 RX ORDER — SODIUM CHLORIDE 9 MG/ML
INJECTION, SOLUTION INTRAVENOUS CONTINUOUS
Status: ACTIVE | OUTPATIENT
Start: 2022-01-01 | End: 2022-01-01

## 2022-01-01 RX ORDER — ROCURONIUM BROMIDE 10 MG/ML
100 INJECTION, SOLUTION INTRAVENOUS ONCE
Status: COMPLETED | OUTPATIENT
Start: 2022-01-01 | End: 2022-01-01

## 2022-01-01 RX ORDER — OXYMETAZOLINE HYDROCHLORIDE 0.05 G/100ML
2 SPRAY NASAL 2 TIMES DAILY
Status: COMPLETED | OUTPATIENT
Start: 2022-01-01 | End: 2022-01-01

## 2022-01-01 RX ORDER — ROCURONIUM BROMIDE 10 MG/ML
150 INJECTION, SOLUTION INTRAVENOUS ONCE
Status: COMPLETED | OUTPATIENT
Start: 2022-01-01 | End: 2022-01-01

## 2022-01-01 RX ORDER — ETOMIDATE 2 MG/ML
20 INJECTION INTRAVENOUS ONCE
Status: COMPLETED | OUTPATIENT
Start: 2022-01-01 | End: 2022-01-01

## 2022-01-01 RX ORDER — SODIUM CHLORIDE, SODIUM LACTATE, POTASSIUM CHLORIDE, AND CALCIUM CHLORIDE .6; .31; .03; .02 G/100ML; G/100ML; G/100ML; G/100ML
1000 INJECTION, SOLUTION INTRAVENOUS
Status: DISCONTINUED | OUTPATIENT
Start: 2022-01-01 | End: 2022-01-01

## 2022-01-01 RX ORDER — CALCIUM GLUCONATE 20 MG/ML
2 INJECTION, SOLUTION INTRAVENOUS ONCE
Status: COMPLETED | OUTPATIENT
Start: 2022-01-01 | End: 2022-01-01

## 2022-01-01 RX ORDER — ONDANSETRON 4 MG/1
4 TABLET, ORALLY DISINTEGRATING ORAL EVERY 4 HOURS PRN
Status: DISCONTINUED | OUTPATIENT
Start: 2022-01-01 | End: 2022-01-01

## 2022-01-01 RX ORDER — LACTULOSE 20 G/30ML
30 SOLUTION ORAL 3 TIMES DAILY
Status: DISCONTINUED | OUTPATIENT
Start: 2022-01-01 | End: 2022-01-01

## 2022-01-01 RX ORDER — MIDODRINE HYDROCHLORIDE 5 MG/1
10 TABLET ORAL
Status: DISCONTINUED | OUTPATIENT
Start: 2022-01-01 | End: 2022-01-01

## 2022-01-01 RX ORDER — PROCHLORPERAZINE EDISYLATE 5 MG/ML
5-10 INJECTION INTRAMUSCULAR; INTRAVENOUS EVERY 4 HOURS PRN
Status: DISCONTINUED | OUTPATIENT
Start: 2022-01-01 | End: 2022-01-01

## 2022-01-01 RX ORDER — PANTOPRAZOLE SODIUM 40 MG/10ML
40 INJECTION, POWDER, LYOPHILIZED, FOR SOLUTION INTRAVENOUS 2 TIMES DAILY
Status: DISCONTINUED | OUTPATIENT
Start: 2022-01-01 | End: 2022-01-01

## 2022-01-01 RX ORDER — MORPHINE SULFATE 10 MG/ML
10 INJECTION, SOLUTION INTRAMUSCULAR; INTRAVENOUS
Status: DISCONTINUED | OUTPATIENT
Start: 2022-01-01 | End: 2022-01-01 | Stop reason: HOSPADM

## 2022-01-01 RX ORDER — SODIUM CHLORIDE 9 MG/ML
1000 INJECTION, SOLUTION INTRAVENOUS ONCE
Status: COMPLETED | OUTPATIENT
Start: 2022-01-01 | End: 2022-01-01

## 2022-01-01 RX ORDER — SODIUM CHLORIDE 9 MG/ML
INJECTION, SOLUTION INTRAVENOUS
Status: DISCONTINUED | OUTPATIENT
Start: 2022-01-01 | End: 2022-01-01

## 2022-01-01 RX ORDER — NOREPINEPHRINE BITARTRATE 0.03 MG/ML
0-30 INJECTION, SOLUTION INTRAVENOUS CONTINUOUS
Status: DISCONTINUED | OUTPATIENT
Start: 2022-01-01 | End: 2022-01-01

## 2022-01-01 RX ORDER — CEFAZOLIN SODIUM 1 G/50ML
1 INJECTION, SOLUTION INTRAVENOUS EVERY 24 HOURS
Status: DISCONTINUED | OUTPATIENT
Start: 2022-01-01 | End: 2022-01-01

## 2022-01-01 RX ORDER — LACTULOSE 20 G/30ML
30 SOLUTION ORAL EVERY 4 HOURS
Status: DISCONTINUED | OUTPATIENT
Start: 2022-01-01 | End: 2022-01-01

## 2022-01-01 RX ORDER — LINEZOLID 2 MG/ML
600 INJECTION, SOLUTION INTRAVENOUS EVERY 12 HOURS
Status: DISCONTINUED | OUTPATIENT
Start: 2022-01-01 | End: 2022-01-01

## 2022-01-01 RX ORDER — GAUZE BANDAGE 2" X 2"
100 BANDAGE TOPICAL EVERY EVENING
Status: COMPLETED | OUTPATIENT
Start: 2022-01-01 | End: 2022-01-01

## 2022-01-01 RX ORDER — ONDANSETRON 2 MG/ML
8 INJECTION INTRAMUSCULAR; INTRAVENOUS EVERY 8 HOURS PRN
Status: DISCONTINUED | OUTPATIENT
Start: 2022-01-01 | End: 2022-01-01 | Stop reason: HOSPADM

## 2022-01-01 RX ORDER — POLYVINYL ALCOHOL 14 MG/ML
2 SOLUTION/ DROPS OPHTHALMIC EVERY 6 HOURS PRN
Status: DISCONTINUED | OUTPATIENT
Start: 2022-01-01 | End: 2022-01-01 | Stop reason: HOSPADM

## 2022-01-01 RX ORDER — FUROSEMIDE 10 MG/ML
80 INJECTION INTRAMUSCULAR; INTRAVENOUS ONCE
Status: COMPLETED | OUTPATIENT
Start: 2022-01-01 | End: 2022-01-01

## 2022-01-01 RX ORDER — NOREPINEPHRINE BITARTRATE 0.03 MG/ML
INJECTION, SOLUTION INTRAVENOUS
Status: COMPLETED
Start: 2022-01-01 | End: 2022-01-01

## 2022-01-01 RX ORDER — POLYETHYLENE GLYCOL 3350 17 G/17G
1 POWDER, FOR SOLUTION ORAL
Status: DISCONTINUED | OUTPATIENT
Start: 2022-01-01 | End: 2022-01-01

## 2022-01-01 RX ORDER — GAUZE BANDAGE 2" X 2"
100 BANDAGE TOPICAL EVERY EVENING
Status: DISCONTINUED | OUTPATIENT
Start: 2022-01-01 | End: 2022-01-01

## 2022-01-01 RX ORDER — AMOXICILLIN 250 MG
2 CAPSULE ORAL 2 TIMES DAILY
Status: DISCONTINUED | OUTPATIENT
Start: 2022-01-01 | End: 2022-01-01

## 2022-01-01 RX ORDER — LORAZEPAM 2 MG/ML
4 INJECTION INTRAMUSCULAR
Status: DISCONTINUED | OUTPATIENT
Start: 2022-01-01 | End: 2022-01-01 | Stop reason: HOSPADM

## 2022-01-01 RX ORDER — SODIUM CHLORIDE 9 MG/ML
INJECTION, SOLUTION INTRAVENOUS CONTINUOUS
Status: DISCONTINUED | OUTPATIENT
Start: 2022-01-01 | End: 2022-01-01

## 2022-01-01 RX ORDER — OCTREOTIDE ACETATE 100 UG/ML
50 INJECTION, SOLUTION INTRAVENOUS; SUBCUTANEOUS ONCE
Status: COMPLETED | OUTPATIENT
Start: 2022-01-01 | End: 2022-01-01

## 2022-01-01 RX ADMIN — Medication 2500 MCG: at 00:07

## 2022-01-01 RX ADMIN — FUROSEMIDE 80 MG: 10 INJECTION, SOLUTION INTRAMUSCULAR; INTRAVENOUS at 15:28

## 2022-01-01 RX ADMIN — LACTULOSE 30 ML: 20 SOLUTION ORAL at 11:22

## 2022-01-01 RX ADMIN — MIDODRINE HYDROCHLORIDE 10 MG: 5 TABLET ORAL at 17:15

## 2022-01-01 RX ADMIN — MIDODRINE HYDROCHLORIDE 10 MG: 5 TABLET ORAL at 12:09

## 2022-01-01 RX ADMIN — Medication 100 MG: at 17:43

## 2022-01-01 RX ADMIN — OXYMETAZOLINE HCL 2 SPRAY: 0.05 SPRAY NASAL at 12:11

## 2022-01-01 RX ADMIN — PROPOFOL 70 MCG/KG/MIN: 10 INJECTION, EMULSION INTRAVENOUS at 17:03

## 2022-01-01 RX ADMIN — PANTOPRAZOLE SODIUM 40 MG: 40 INJECTION, POWDER, LYOPHILIZED, FOR SOLUTION INTRAVENOUS at 05:37

## 2022-01-01 RX ADMIN — LACTULOSE 30 ML: 20 SOLUTION ORAL at 22:14

## 2022-01-01 RX ADMIN — PROPOFOL 80 MCG/KG/MIN: 10 INJECTION, EMULSION INTRAVENOUS at 03:02

## 2022-01-01 RX ADMIN — RIFAXIMIN 550 MG: 550 TABLET ORAL at 05:52

## 2022-01-01 RX ADMIN — PHYTONADIONE 10 MG: 10 INJECTION, EMULSION INTRAMUSCULAR; INTRAVENOUS; SUBCUTANEOUS at 05:25

## 2022-01-01 RX ADMIN — SODIUM CHLORIDE: 9 INJECTION, SOLUTION INTRAVENOUS at 13:15

## 2022-01-01 RX ADMIN — PREDNISOLONE 39.9 MG: 15 SOLUTION ORAL at 05:25

## 2022-01-01 RX ADMIN — MIDODRINE HYDROCHLORIDE 10 MG: 5 TABLET ORAL at 17:25

## 2022-01-01 RX ADMIN — PROPOFOL 40 MCG/KG/MIN: 10 INJECTION, EMULSION INTRAVENOUS at 07:27

## 2022-01-01 RX ADMIN — LACTULOSE 30 ML: 20 SOLUTION ORAL at 22:28

## 2022-01-01 RX ADMIN — HEPARIN SODIUM 5000 UNITS: 5000 INJECTION, SOLUTION INTRAVENOUS; SUBCUTANEOUS at 05:11

## 2022-01-01 RX ADMIN — VASOPRESSIN 0.03 UNITS/MIN: 20 INJECTION PARENTERAL at 17:18

## 2022-01-01 RX ADMIN — ETOMIDATE 20 MG: 2 INJECTION INTRAVENOUS at 10:35

## 2022-01-01 RX ADMIN — MINERAL OIL, PETROLATUM 1 APPLICATION: 425; 573 OINTMENT OPHTHALMIC at 07:56

## 2022-01-01 RX ADMIN — LORAZEPAM 4 MG: 2 INJECTION INTRAMUSCULAR; INTRAVENOUS at 16:02

## 2022-01-01 RX ADMIN — NOREPINEPHRINE BITARTRATE 16 MCG/MIN: 1 INJECTION, SOLUTION, CONCENTRATE INTRAVENOUS at 10:20

## 2022-01-01 RX ADMIN — NOREPINEPHRINE BITARTRATE 5 MCG/MIN: 1 INJECTION, SOLUTION, CONCENTRATE INTRAVENOUS at 07:33

## 2022-01-01 RX ADMIN — PROPOFOL 30 MCG/KG/MIN: 10 INJECTION, EMULSION INTRAVENOUS at 03:46

## 2022-01-01 RX ADMIN — DESMOPRESSIN ACETATE 57.9 MCG: 4 SOLUTION INTRAVENOUS at 01:38

## 2022-01-01 RX ADMIN — LACTULOSE 30 ML: 20 SOLUTION ORAL at 05:11

## 2022-01-01 RX ADMIN — LACTULOSE 30 ML: 20 SOLUTION ORAL at 13:39

## 2022-01-01 RX ADMIN — PROPOFOL 10 MCG/KG/MIN: 10 INJECTION, EMULSION INTRAVENOUS at 18:39

## 2022-01-01 RX ADMIN — SODIUM BICARBONATE: 84 INJECTION, SOLUTION INTRAVENOUS at 15:29

## 2022-01-01 RX ADMIN — PROPOFOL 70 MCG/KG/MIN: 10 INJECTION, EMULSION INTRAVENOUS at 10:25

## 2022-01-01 RX ADMIN — LACTULOSE 30 ML: 20 SOLUTION ORAL at 02:10

## 2022-01-01 RX ADMIN — WATER 2 G: 100 INJECTION, SOLUTION INTRAVENOUS at 05:46

## 2022-01-01 RX ADMIN — PROPOFOL 25 MCG/KG/MIN: 10 INJECTION, EMULSION INTRAVENOUS at 19:19

## 2022-01-01 RX ADMIN — HEPARIN SODIUM 2400 UNITS: 1000 INJECTION, SOLUTION INTRAVENOUS; SUBCUTANEOUS at 12:52

## 2022-01-01 RX ADMIN — LACTULOSE 30 ML: 20 SOLUTION ORAL at 05:46

## 2022-01-01 RX ADMIN — LACTULOSE 30 ML: 20 SOLUTION ORAL at 09:23

## 2022-01-01 RX ADMIN — NOREPINEPHRINE BITARTRATE 8 MCG/MIN: 1 INJECTION, SOLUTION, CONCENTRATE INTRAVENOUS at 00:03

## 2022-01-01 RX ADMIN — OXYMETAZOLINE HCL 2 SPRAY: 0.05 SPRAY NASAL at 18:14

## 2022-01-01 RX ADMIN — PROPOFOL 65 MCG/KG/MIN: 10 INJECTION, EMULSION INTRAVENOUS at 23:07

## 2022-01-01 RX ADMIN — NYSTATIN: 100000 POWDER TOPICAL at 11:38

## 2022-01-01 RX ADMIN — PROPOFOL 70 MCG/KG/MIN: 10 INJECTION, EMULSION INTRAVENOUS at 18:22

## 2022-01-01 RX ADMIN — NOREPINEPHRINE BITARTRATE 20 MCG/MIN: 1 INJECTION, SOLUTION, CONCENTRATE INTRAVENOUS at 04:02

## 2022-01-01 RX ADMIN — OMEPRAZOLE 40 MG: KIT at 18:14

## 2022-01-01 RX ADMIN — LACTULOSE 30 ML: 20 SOLUTION ORAL at 12:08

## 2022-01-01 RX ADMIN — MIDODRINE HYDROCHLORIDE 10 MG: 5 TABLET ORAL at 07:54

## 2022-01-01 RX ADMIN — ALBUMIN (HUMAN) 25 G: 0.25 INJECTION, SOLUTION INTRAVENOUS at 19:37

## 2022-01-01 RX ADMIN — PROPOFOL 70 MCG/KG/MIN: 10 INJECTION, EMULSION INTRAVENOUS at 05:40

## 2022-01-01 RX ADMIN — LACTULOSE 30 ML: 20 SOLUTION ORAL at 18:14

## 2022-01-01 RX ADMIN — PROPOFOL 65 MCG/KG/MIN: 10 INJECTION, EMULSION INTRAVENOUS at 02:07

## 2022-01-01 RX ADMIN — PROPOFOL 70 MCG/KG/MIN: 10 INJECTION, EMULSION INTRAVENOUS at 23:20

## 2022-01-01 RX ADMIN — PROPOFOL 40 MCG/KG/MIN: 10 INJECTION, EMULSION INTRAVENOUS at 02:10

## 2022-01-01 RX ADMIN — NOREPINEPHRINE BITARTRATE 15 MCG/MIN: 1 INJECTION, SOLUTION, CONCENTRATE INTRAVENOUS at 21:20

## 2022-01-01 RX ADMIN — MIDODRINE HYDROCHLORIDE 10 MG: 5 TABLET ORAL at 08:19

## 2022-01-01 RX ADMIN — HEPARIN SODIUM 5000 UNITS: 5000 INJECTION, SOLUTION INTRAVENOUS; SUBCUTANEOUS at 14:43

## 2022-01-01 RX ADMIN — LACTULOSE 30 ML: 20 SOLUTION ORAL at 09:59

## 2022-01-01 RX ADMIN — Medication 100 MCG/HR: at 03:03

## 2022-01-01 RX ADMIN — LACTULOSE 30 ML: 20 SOLUTION ORAL at 17:15

## 2022-01-01 RX ADMIN — NYSTATIN: 100000 POWDER TOPICAL at 17:16

## 2022-01-01 RX ADMIN — NOREPINEPHRINE BITARTRATE 15 MCG/MIN: 1 INJECTION, SOLUTION, CONCENTRATE INTRAVENOUS at 17:03

## 2022-01-01 RX ADMIN — RIFAXIMIN 550 MG: 550 TABLET ORAL at 05:24

## 2022-01-01 RX ADMIN — MIDODRINE HYDROCHLORIDE 10 MG: 5 TABLET ORAL at 17:26

## 2022-01-01 RX ADMIN — FOLIC ACID 1 MG: 1 TABLET ORAL at 17:16

## 2022-01-01 RX ADMIN — LACTULOSE 30 ML: 20 SOLUTION ORAL at 14:56

## 2022-01-01 RX ADMIN — FOLIC ACID 1 MG: 1 TABLET ORAL at 17:26

## 2022-01-01 RX ADMIN — LACTULOSE 30 ML: 20 SOLUTION ORAL at 14:45

## 2022-01-01 RX ADMIN — RIFAXIMIN 550 MG: 550 TABLET ORAL at 05:37

## 2022-01-01 RX ADMIN — ROCURONIUM BROMIDE 150 MG: 10 INJECTION, SOLUTION INTRAVENOUS at 10:35

## 2022-01-01 RX ADMIN — PROPOFOL 25 MCG/KG/MIN: 10 INJECTION, EMULSION INTRAVENOUS at 10:19

## 2022-01-01 RX ADMIN — PROPOFOL 70 MCG/KG/MIN: 10 INJECTION, EMULSION INTRAVENOUS at 14:53

## 2022-01-01 RX ADMIN — NYSTATIN: 100000 POWDER TOPICAL at 12:46

## 2022-01-01 RX ADMIN — CALCIUM GLUCONATE 2 G: 20 INJECTION, SOLUTION INTRAVENOUS at 23:53

## 2022-01-01 RX ADMIN — PREDNISOLONE 39.9 MG: 15 SOLUTION ORAL at 05:47

## 2022-01-01 RX ADMIN — HEPARIN SODIUM 2400 UNITS: 1000 INJECTION, SOLUTION INTRAVENOUS; SUBCUTANEOUS at 22:56

## 2022-01-01 RX ADMIN — CEFTRIAXONE SODIUM 2 G: 10 INJECTION, POWDER, FOR SOLUTION INTRAVENOUS at 08:40

## 2022-01-01 RX ADMIN — OCTREOTIDE ACETATE 50 MCG/HR: 200 INJECTION, SOLUTION INTRAVENOUS; SUBCUTANEOUS at 00:02

## 2022-01-01 RX ADMIN — MINERAL OIL, PETROLATUM 1 APPLICATION: 425; 573 OINTMENT OPHTHALMIC at 21:40

## 2022-01-01 RX ADMIN — PREDNISOLONE 39.9 MG: 15 SOLUTION ORAL at 05:37

## 2022-01-01 RX ADMIN — WATER 2 G: 100 INJECTION, SOLUTION INTRAVENOUS at 13:31

## 2022-01-01 RX ADMIN — PROPOFOL 70 MCG/KG/MIN: 10 INJECTION, EMULSION INTRAVENOUS at 09:03

## 2022-01-01 RX ADMIN — LACTULOSE 30 ML: 20 SOLUTION ORAL at 17:09

## 2022-01-01 RX ADMIN — LACTULOSE 30 ML: 20 SOLUTION ORAL at 17:42

## 2022-01-01 RX ADMIN — LACTULOSE 30 ML: 20 SOLUTION ORAL at 22:39

## 2022-01-01 RX ADMIN — MIDODRINE HYDROCHLORIDE 10 MG: 5 TABLET ORAL at 08:36

## 2022-01-01 RX ADMIN — NOREPINEPHRINE BITARTRATE 20 MCG/MIN: 1 INJECTION, SOLUTION, CONCENTRATE INTRAVENOUS at 17:20

## 2022-01-01 RX ADMIN — PROPOFOL 70 MCG/KG/MIN: 10 INJECTION, EMULSION INTRAVENOUS at 08:17

## 2022-01-01 RX ADMIN — LACTULOSE 30 ML: 20 SOLUTION ORAL at 10:10

## 2022-01-01 RX ADMIN — PROPOFOL 30 MCG/KG/MIN: 10 INJECTION, EMULSION INTRAVENOUS at 15:19

## 2022-01-01 RX ADMIN — NYSTATIN: 100000 POWDER TOPICAL at 05:24

## 2022-01-01 RX ADMIN — RIFAXIMIN 550 MG: 550 TABLET ORAL at 05:40

## 2022-01-01 RX ADMIN — GLYCOPYRROLATE 0.2 MG: 0.2 INJECTION INTRAMUSCULAR; INTRAVENOUS at 16:02

## 2022-01-01 RX ADMIN — SODIUM CHLORIDE: 9 INJECTION, SOLUTION INTRAVENOUS at 06:13

## 2022-01-01 RX ADMIN — ROCURONIUM BROMIDE 2 MCG/KG/MIN: 10 INJECTION, SOLUTION INTRAVENOUS at 04:12

## 2022-01-01 RX ADMIN — Medication 2500 MCG: at 00:45

## 2022-01-01 RX ADMIN — Medication 50 MCG/HR: at 17:42

## 2022-01-01 RX ADMIN — CEFTRIAXONE SODIUM 2 G: 10 INJECTION, POWDER, FOR SOLUTION INTRAVENOUS at 05:48

## 2022-01-01 RX ADMIN — RIFAXIMIN 550 MG: 550 TABLET ORAL at 23:55

## 2022-01-01 RX ADMIN — LACTULOSE 30 ML: 20 SOLUTION ORAL at 02:39

## 2022-01-01 RX ADMIN — NOREPINEPHRINE BITARTRATE 13 MCG/MIN: 1 INJECTION, SOLUTION, CONCENTRATE INTRAVENOUS at 09:54

## 2022-01-01 RX ADMIN — ALBUMIN (HUMAN) 25 G: 0.25 INJECTION, SOLUTION INTRAVENOUS at 17:26

## 2022-01-01 RX ADMIN — PROPOFOL 70 MCG/KG/MIN: 10 INJECTION, EMULSION INTRAVENOUS at 03:20

## 2022-01-01 RX ADMIN — PANTOPRAZOLE SODIUM 40 MG: 40 INJECTION, POWDER, LYOPHILIZED, FOR SOLUTION INTRAVENOUS at 17:26

## 2022-01-01 RX ADMIN — RIFAXIMIN 550 MG: 550 TABLET ORAL at 17:26

## 2022-01-01 RX ADMIN — PROPOFOL 70 MCG/KG/MIN: 10 INJECTION, EMULSION INTRAVENOUS at 10:46

## 2022-01-01 RX ADMIN — SODIUM CHLORIDE: 9 INJECTION, SOLUTION INTRAVENOUS at 02:27

## 2022-01-01 RX ADMIN — LACTULOSE 30 ML: 20 SOLUTION ORAL at 01:31

## 2022-01-01 RX ADMIN — Medication 200 MCG/HR: at 01:58

## 2022-01-01 RX ADMIN — NYSTATIN: 100000 POWDER TOPICAL at 11:22

## 2022-01-01 RX ADMIN — PROPOFOL 70 MCG/KG/MIN: 10 INJECTION, EMULSION INTRAVENOUS at 13:12

## 2022-01-01 RX ADMIN — FOLIC ACID 1 MG: 1 TABLET ORAL at 17:42

## 2022-01-01 RX ADMIN — LINEZOLID 600 MG: 600 INJECTION, SOLUTION INTRAVENOUS at 10:10

## 2022-01-01 RX ADMIN — NYSTATIN: 100000 POWDER TOPICAL at 11:53

## 2022-01-01 RX ADMIN — WATER 2 G: 100 INJECTION, SOLUTION INTRAVENOUS at 22:27

## 2022-01-01 RX ADMIN — NOREPINEPHRINE BITARTRATE 40 MCG/MIN: 1 INJECTION, SOLUTION, CONCENTRATE INTRAVENOUS at 12:58

## 2022-01-01 RX ADMIN — ALBUMIN (HUMAN) 25 G: 0.25 INJECTION, SOLUTION INTRAVENOUS at 05:37

## 2022-01-01 RX ADMIN — LACTULOSE 30 ML: 20 SOLUTION ORAL at 05:48

## 2022-01-01 RX ADMIN — NYSTATIN: 100000 POWDER TOPICAL at 18:14

## 2022-01-01 RX ADMIN — PROPOFOL 65 MCG/KG/MIN: 10 INJECTION, EMULSION INTRAVENOUS at 14:47

## 2022-01-01 RX ADMIN — NYSTATIN: 100000 POWDER TOPICAL at 07:06

## 2022-01-01 RX ADMIN — OMEPRAZOLE 40 MG: KIT at 17:09

## 2022-01-01 RX ADMIN — NOREPINEPHRINE BITARTRATE 25 MCG/MIN: 1 INJECTION, SOLUTION, CONCENTRATE INTRAVENOUS at 14:24

## 2022-01-01 RX ADMIN — ROCURONIUM BROMIDE 100 MG: 10 INJECTION, SOLUTION INTRAVENOUS at 14:32

## 2022-01-01 RX ADMIN — OCTREOTIDE ACETATE 50 MCG/HR: 200 INJECTION, SOLUTION INTRAVENOUS; SUBCUTANEOUS at 23:14

## 2022-01-01 RX ADMIN — ROCURONIUM BROMIDE 4 MCG/KG/MIN: 10 INJECTION, SOLUTION INTRAVENOUS at 10:55

## 2022-01-01 RX ADMIN — LACTULOSE 30 ML: 20 SOLUTION ORAL at 13:37

## 2022-01-01 RX ADMIN — HEPARIN SODIUM 2400 UNITS: 1000 INJECTION, SOLUTION INTRAVENOUS; SUBCUTANEOUS at 12:44

## 2022-01-01 RX ADMIN — SODIUM BICARBONATE: 84 INJECTION, SOLUTION INTRAVENOUS at 15:46

## 2022-01-01 RX ADMIN — PANTOPRAZOLE SODIUM 80 MG: 40 INJECTION, POWDER, LYOPHILIZED, FOR SOLUTION INTRAVENOUS at 21:16

## 2022-01-01 RX ADMIN — NOREPINEPHRINE BITARTRATE 20 MCG/MIN: 1 INJECTION, SOLUTION, CONCENTRATE INTRAVENOUS at 13:39

## 2022-01-01 RX ADMIN — PROPOFOL 70 MCG/KG/MIN: 10 INJECTION, EMULSION INTRAVENOUS at 22:13

## 2022-01-01 RX ADMIN — NYSTATIN: 100000 POWDER TOPICAL at 11:06

## 2022-01-01 RX ADMIN — ROCURONIUM BROMIDE 2 MCG/KG/MIN: 10 INJECTION, SOLUTION INTRAVENOUS at 17:04

## 2022-01-01 RX ADMIN — CEFTRIAXONE SODIUM 2 G: 10 INJECTION, POWDER, FOR SOLUTION INTRAVENOUS at 05:11

## 2022-01-01 RX ADMIN — ALBUMIN (HUMAN) 25 G: 0.25 INJECTION, SOLUTION INTRAVENOUS at 00:03

## 2022-01-01 RX ADMIN — Medication 100 MCG/HR: at 00:49

## 2022-01-01 RX ADMIN — MINERAL OIL, PETROLATUM 1 APPLICATION: 425; 573 OINTMENT OPHTHALMIC at 10:54

## 2022-01-01 RX ADMIN — MINERAL OIL, PETROLATUM 1 APPLICATION: 425; 573 OINTMENT OPHTHALMIC at 14:45

## 2022-01-01 RX ADMIN — NOREPINEPHRINE BITARTRATE 40 MCG/MIN: 1 INJECTION, SOLUTION, CONCENTRATE INTRAVENOUS at 09:15

## 2022-01-01 RX ADMIN — PROPOFOL 65 MCG/KG/MIN: 10 INJECTION, EMULSION INTRAVENOUS at 05:22

## 2022-01-01 RX ADMIN — ALBUMIN (HUMAN) 25 G: 0.25 INJECTION, SOLUTION INTRAVENOUS at 11:22

## 2022-01-01 RX ADMIN — LACTULOSE 30 ML: 20 SOLUTION ORAL at 05:24

## 2022-01-01 RX ADMIN — NYSTATIN: 100000 POWDER TOPICAL at 05:38

## 2022-01-01 RX ADMIN — SODIUM CHLORIDE 1000 ML: 9 INJECTION, SOLUTION INTRAVENOUS at 23:52

## 2022-01-01 RX ADMIN — MORPHINE SULFATE 10 MG: 10 INJECTION INTRAVENOUS at 16:20

## 2022-01-01 RX ADMIN — NYSTATIN: 100000 POWDER TOPICAL at 18:16

## 2022-01-01 RX ADMIN — MINERAL OIL, PETROLATUM 1 APPLICATION: 425; 573 OINTMENT OPHTHALMIC at 05:52

## 2022-01-01 RX ADMIN — Medication 50 MCG/HR: at 10:44

## 2022-01-01 RX ADMIN — NOREPINEPHRINE BITARTRATE 30 MCG/MIN: 1 INJECTION, SOLUTION, CONCENTRATE INTRAVENOUS at 15:23

## 2022-01-01 RX ADMIN — ONDANSETRON 4 MG: 2 INJECTION INTRAMUSCULAR; INTRAVENOUS at 21:18

## 2022-01-01 RX ADMIN — PANTOPRAZOLE SODIUM 40 MG: 40 INJECTION, POWDER, LYOPHILIZED, FOR SOLUTION INTRAVENOUS at 18:31

## 2022-01-01 RX ADMIN — PROPOFOL 25 MCG/KG/MIN: 10 INJECTION, EMULSION INTRAVENOUS at 06:43

## 2022-01-01 RX ADMIN — MIDODRINE HYDROCHLORIDE 10 MG: 5 TABLET ORAL at 07:30

## 2022-01-01 RX ADMIN — RIFAXIMIN 550 MG: 550 TABLET ORAL at 17:16

## 2022-01-01 RX ADMIN — PIPERACILLIN AND TAZOBACTAM 4.5 G: 4; .5 INJECTION, POWDER, LYOPHILIZED, FOR SOLUTION INTRAVENOUS; PARENTERAL at 11:09

## 2022-01-01 RX ADMIN — PROPOFOL 5 MCG/KG/MIN: 10 INJECTION, EMULSION INTRAVENOUS at 10:45

## 2022-01-01 RX ADMIN — PHYTONADIONE 10 MG: 10 INJECTION, EMULSION INTRAMUSCULAR; INTRAVENOUS; SUBCUTANEOUS at 05:37

## 2022-01-01 RX ADMIN — HEPARIN SODIUM 2400 UNITS: 1000 INJECTION, SOLUTION INTRAVENOUS; SUBCUTANEOUS at 12:04

## 2022-01-01 RX ADMIN — NYSTATIN: 100000 POWDER TOPICAL at 17:51

## 2022-01-01 RX ADMIN — HEPARIN SODIUM 5000 UNITS: 5000 INJECTION, SOLUTION INTRAVENOUS; SUBCUTANEOUS at 08:40

## 2022-01-01 RX ADMIN — PROPOFOL 65 MCG/KG/MIN: 10 INJECTION, EMULSION INTRAVENOUS at 16:10

## 2022-01-01 RX ADMIN — PROPOFOL 70 MCG/KG/MIN: 10 INJECTION, EMULSION INTRAVENOUS at 20:01

## 2022-01-01 RX ADMIN — PROPOFOL 40 MCG/KG/MIN: 10 INJECTION, EMULSION INTRAVENOUS at 17:03

## 2022-01-01 RX ADMIN — PROPOFOL 70 MCG/KG/MIN: 10 INJECTION, EMULSION INTRAVENOUS at 02:05

## 2022-01-01 RX ADMIN — PROPOFOL 65 MCG/KG/MIN: 10 INJECTION, EMULSION INTRAVENOUS at 20:23

## 2022-01-01 RX ADMIN — LACTULOSE 30 ML: 20 SOLUTION ORAL at 02:41

## 2022-01-01 RX ADMIN — ROCURONIUM BROMIDE 2 MCG/KG/MIN: 10 INJECTION, SOLUTION INTRAVENOUS at 15:09

## 2022-01-01 RX ADMIN — ALBUMIN (HUMAN) 25 G: 0.25 INJECTION, SOLUTION INTRAVENOUS at 23:54

## 2022-01-01 RX ADMIN — OXYMETAZOLINE HCL 2 SPRAY: 0.05 SPRAY NASAL at 18:32

## 2022-01-01 RX ADMIN — PROPOFOL 65 MCG/KG/MIN: 10 INJECTION, EMULSION INTRAVENOUS at 07:49

## 2022-01-01 RX ADMIN — PREDNISOLONE 39.9 MG: 15 SOLUTION ORAL at 05:41

## 2022-01-01 RX ADMIN — OXYMETAZOLINE HCL 2 SPRAY: 0.05 SPRAY NASAL at 06:00

## 2022-01-01 RX ADMIN — NYSTATIN: 100000 POWDER TOPICAL at 17:25

## 2022-01-01 RX ADMIN — LACTULOSE 30 ML: 20 SOLUTION ORAL at 02:07

## 2022-01-01 RX ADMIN — PROPOFOL 65 MCG/KG/MIN: 10 INJECTION, EMULSION INTRAVENOUS at 19:06

## 2022-01-01 RX ADMIN — MIDODRINE HYDROCHLORIDE 10 MG: 5 TABLET ORAL at 11:06

## 2022-01-01 RX ADMIN — PROPOFOL 70 MCG/KG/MIN: 10 INJECTION, EMULSION INTRAVENOUS at 19:30

## 2022-01-01 RX ADMIN — LACTULOSE 30 ML: 20 SOLUTION ORAL at 05:37

## 2022-01-01 RX ADMIN — PREDNISOLONE 39.9 MG: 15 SOLUTION ORAL at 05:48

## 2022-01-01 RX ADMIN — PROPOFOL 40 MCG/KG/MIN: 10 INJECTION, EMULSION INTRAVENOUS at 21:29

## 2022-01-01 RX ADMIN — PROPOFOL 25 MCG/KG/MIN: 10 INJECTION, EMULSION INTRAVENOUS at 13:31

## 2022-01-01 RX ADMIN — PROPOFOL 10 MCG/KG/MIN: 10 INJECTION, EMULSION INTRAVENOUS at 12:46

## 2022-01-01 RX ADMIN — HEPARIN SODIUM 2400 UNITS: 1000 INJECTION, SOLUTION INTRAVENOUS; SUBCUTANEOUS at 14:45

## 2022-01-01 RX ADMIN — PANTOPRAZOLE SODIUM 40 MG: 40 INJECTION, POWDER, LYOPHILIZED, FOR SOLUTION INTRAVENOUS at 17:15

## 2022-01-01 RX ADMIN — PROPOFOL 70 MCG/KG/MIN: 10 INJECTION, EMULSION INTRAVENOUS at 19:35

## 2022-01-01 RX ADMIN — CEFTRIAXONE SODIUM 2 G: 10 INJECTION, POWDER, FOR SOLUTION INTRAVENOUS at 17:45

## 2022-01-01 RX ADMIN — NOREPINEPHRINE BITARTRATE 15 MCG/MIN: 1 INJECTION, SOLUTION, CONCENTRATE INTRAVENOUS at 00:56

## 2022-01-01 RX ADMIN — PIPERACILLIN AND TAZOBACTAM 4.5 G: 4; .5 INJECTION, POWDER, LYOPHILIZED, FOR SOLUTION INTRAVENOUS; PARENTERAL at 10:10

## 2022-01-01 RX ADMIN — PROPOFOL 70 MCG/KG/MIN: 10 INJECTION, EMULSION INTRAVENOUS at 11:06

## 2022-01-01 RX ADMIN — PROPOFOL 65 MCG/KG/MIN: 10 INJECTION, EMULSION INTRAVENOUS at 17:39

## 2022-01-01 RX ADMIN — NYSTATIN: 100000 POWDER TOPICAL at 05:51

## 2022-01-01 RX ADMIN — PROPOFOL 70 MCG/KG/MIN: 10 INJECTION, EMULSION INTRAVENOUS at 04:42

## 2022-01-01 RX ADMIN — PROPOFOL 65 MCG/KG/MIN: 10 INJECTION, EMULSION INTRAVENOUS at 11:53

## 2022-01-01 RX ADMIN — PROPOFOL 70 MCG/KG/MIN: 10 INJECTION, EMULSION INTRAVENOUS at 04:04

## 2022-01-01 RX ADMIN — PROPOFOL 70 MCG/KG/MIN: 10 INJECTION, EMULSION INTRAVENOUS at 17:24

## 2022-01-01 RX ADMIN — PROPOFOL 70 MCG/KG/MIN: 10 INJECTION, EMULSION INTRAVENOUS at 13:37

## 2022-01-01 RX ADMIN — LACTULOSE 30 ML: 20 SOLUTION ORAL at 09:31

## 2022-01-01 RX ADMIN — LACTULOSE 30 ML: 20 SOLUTION ORAL at 21:29

## 2022-01-01 RX ADMIN — TRANEXAMIC ACID 1000 MG: 100 INJECTION, SOLUTION INTRAVENOUS at 10:19

## 2022-01-01 RX ADMIN — Medication 1 EACH: at 16:27

## 2022-01-01 RX ADMIN — NYSTATIN: 100000 POWDER TOPICAL at 05:47

## 2022-01-01 RX ADMIN — TRANEXAMIC ACID 1000 MG: 100 INJECTION, SOLUTION INTRAVENOUS at 10:25

## 2022-01-01 RX ADMIN — PREDNISOLONE 39.9 MG: 15 SOLUTION ORAL at 06:13

## 2022-01-01 RX ADMIN — PROPOFOL 70 MCG/KG/MIN: 10 INJECTION, EMULSION INTRAVENOUS at 12:20

## 2022-01-01 RX ADMIN — PROPOFOL 70 MCG/KG/MIN: 10 INJECTION, EMULSION INTRAVENOUS at 07:10

## 2022-01-01 RX ADMIN — PANTOPRAZOLE SODIUM 40 MG: 40 INJECTION, POWDER, LYOPHILIZED, FOR SOLUTION INTRAVENOUS at 17:42

## 2022-01-01 RX ADMIN — NYSTATIN: 100000 POWDER TOPICAL at 18:32

## 2022-01-01 RX ADMIN — PROPOFOL 20 MCG/KG/MIN: 10 INJECTION, EMULSION INTRAVENOUS at 20:34

## 2022-01-01 RX ADMIN — PROPOFOL 70 MCG/KG/MIN: 10 INJECTION, EMULSION INTRAVENOUS at 15:47

## 2022-01-01 RX ADMIN — PROPOFOL 70 MCG/KG/MIN: 10 INJECTION, EMULSION INTRAVENOUS at 20:45

## 2022-01-01 RX ADMIN — RIFAXIMIN 550 MG: 550 TABLET ORAL at 17:25

## 2022-01-01 RX ADMIN — LACTULOSE 30 ML: 20 SOLUTION ORAL at 05:40

## 2022-01-01 RX ADMIN — LACTULOSE 30 ML: 20 SOLUTION ORAL at 21:40

## 2022-01-01 RX ADMIN — PROPOFOL 70 MCG/KG/MIN: 10 INJECTION, EMULSION INTRAVENOUS at 07:11

## 2022-01-01 RX ADMIN — THERA TABS 1 TABLET: TAB at 17:25

## 2022-01-01 RX ADMIN — MORPHINE SULFATE 10 MG: 10 INJECTION INTRAVENOUS at 16:02

## 2022-01-01 RX ADMIN — SODIUM CHLORIDE: 9 INJECTION, SOLUTION INTRAVENOUS at 12:44

## 2022-01-01 RX ADMIN — LACTULOSE 30 ML: 20 SOLUTION ORAL at 13:31

## 2022-01-01 RX ADMIN — PROPOFOL 65 MCG/KG/MIN: 10 INJECTION, EMULSION INTRAVENOUS at 13:31

## 2022-01-01 RX ADMIN — PANTOPRAZOLE SODIUM 40 MG: 40 INJECTION, POWDER, LYOPHILIZED, FOR SOLUTION INTRAVENOUS at 05:46

## 2022-01-01 RX ADMIN — MIDODRINE HYDROCHLORIDE 10 MG: 5 TABLET ORAL at 17:42

## 2022-01-01 RX ADMIN — NOREPINEPHRINE BITARTRATE 25 MCG/MIN: 1 INJECTION, SOLUTION, CONCENTRATE INTRAVENOUS at 10:11

## 2022-01-01 RX ADMIN — VASOPRESSIN 0.03 UNITS/MIN: 20 INJECTION PARENTERAL at 04:40

## 2022-01-01 RX ADMIN — Medication 100 MG: at 17:16

## 2022-01-01 RX ADMIN — MIDODRINE HYDROCHLORIDE 10 MG: 5 TABLET ORAL at 11:22

## 2022-01-01 RX ADMIN — PROPOFOL 70 MCG/KG/MIN: 10 INJECTION, EMULSION INTRAVENOUS at 00:31

## 2022-01-01 RX ADMIN — PROPOFOL 20 MCG/KG/MIN: 10 INJECTION, EMULSION INTRAVENOUS at 00:03

## 2022-01-01 RX ADMIN — SODIUM CHLORIDE: 9 INJECTION, SOLUTION INTRAVENOUS at 01:38

## 2022-01-01 RX ADMIN — LACTULOSE 30 ML: 20 SOLUTION ORAL at 05:51

## 2022-01-01 RX ADMIN — PROPOFOL 40 MCG/KG/MIN: 10 INJECTION, EMULSION INTRAVENOUS at 23:40

## 2022-01-01 RX ADMIN — PROPOFOL 70 MCG/KG/MIN: 10 INJECTION, EMULSION INTRAVENOUS at 09:49

## 2022-01-01 RX ADMIN — NYSTATIN: 100000 POWDER TOPICAL at 17:10

## 2022-01-01 RX ADMIN — LACTULOSE 30 ML: 20 SOLUTION ORAL at 10:11

## 2022-01-01 RX ADMIN — MINERAL OIL, PETROLATUM 1 APPLICATION: 425; 573 OINTMENT OPHTHALMIC at 22:28

## 2022-01-01 RX ADMIN — LACTULOSE 30 ML: 20 SOLUTION ORAL at 14:13

## 2022-01-01 RX ADMIN — Medication 100 MG: at 17:25

## 2022-01-01 RX ADMIN — PANTOPRAZOLE SODIUM 40 MG: 40 INJECTION, POWDER, LYOPHILIZED, FOR SOLUTION INTRAVENOUS at 05:51

## 2022-01-01 RX ADMIN — PANTOPRAZOLE SODIUM 40 MG: 40 INJECTION, POWDER, LYOPHILIZED, FOR SOLUTION INTRAVENOUS at 05:24

## 2022-01-01 RX ADMIN — THERA TABS 1 TABLET: TAB at 17:27

## 2022-01-01 RX ADMIN — NYSTATIN: 100000 POWDER TOPICAL at 12:09

## 2022-01-01 RX ADMIN — LACTULOSE 30 ML: 20 SOLUTION ORAL at 17:26

## 2022-01-01 RX ADMIN — HEPARIN SODIUM 5000 UNITS: 5000 INJECTION, SOLUTION INTRAVENOUS; SUBCUTANEOUS at 21:39

## 2022-01-01 RX ADMIN — OMEPRAZOLE 40 MG: KIT at 05:14

## 2022-01-01 RX ADMIN — NOREPINEPHRINE BITARTRATE 20 MCG/MIN: 1 INJECTION, SOLUTION, CONCENTRATE INTRAVENOUS at 06:24

## 2022-01-01 RX ADMIN — LACTULOSE 30 ML: 20 SOLUTION ORAL at 21:39

## 2022-01-01 RX ADMIN — CEFTRIAXONE SODIUM 2 G: 10 INJECTION, POWDER, FOR SOLUTION INTRAVENOUS at 23:14

## 2022-01-01 RX ADMIN — RIFAXIMIN 550 MG: 550 TABLET ORAL at 18:31

## 2022-01-01 RX ADMIN — ALBUMIN (HUMAN) 25 G: 0.25 INJECTION, SOLUTION INTRAVENOUS at 11:42

## 2022-01-01 RX ADMIN — PROPOFOL 70 MCG/KG/MIN: 10 INJECTION, EMULSION INTRAVENOUS at 14:45

## 2022-01-01 RX ADMIN — RIFAXIMIN 550 MG: 550 TABLET ORAL at 17:43

## 2022-01-01 RX ADMIN — FOLIC ACID 1 MG: 1 TABLET ORAL at 17:25

## 2022-01-01 RX ADMIN — ALBUMIN (HUMAN) 25 G: 0.25 INJECTION, SOLUTION INTRAVENOUS at 06:12

## 2022-01-01 RX ADMIN — PROPOFOL 65 MCG/KG/MIN: 10 INJECTION, EMULSION INTRAVENOUS at 06:37

## 2022-01-01 RX ADMIN — PANTOPRAZOLE SODIUM 40 MG: 40 INJECTION, POWDER, LYOPHILIZED, FOR SOLUTION INTRAVENOUS at 05:40

## 2022-01-01 RX ADMIN — PROPOFOL 40 MCG/KG/MIN: 10 INJECTION, EMULSION INTRAVENOUS at 04:27

## 2022-01-01 RX ADMIN — NOREPINEPHRINE BITARTRATE 20 MCG/MIN: 1 INJECTION, SOLUTION, CONCENTRATE INTRAVENOUS at 20:21

## 2022-01-01 RX ADMIN — MINERAL OIL, PETROLATUM 1 APPLICATION: 425; 573 OINTMENT OPHTHALMIC at 22:14

## 2022-01-01 RX ADMIN — OXYMETAZOLINE HCL 2 SPRAY: 0.05 SPRAY NASAL at 17:25

## 2022-01-01 RX ADMIN — Medication 100 MG: at 17:26

## 2022-01-01 RX ADMIN — PROPOFOL 70 MCG/KG/MIN: 10 INJECTION, EMULSION INTRAVENOUS at 09:05

## 2022-01-01 RX ADMIN — PREDNISOLONE 39.9 MG: 15 SOLUTION ORAL at 05:52

## 2022-01-01 RX ADMIN — PROPOFOL 35 MCG/KG/MIN: 10 INJECTION, EMULSION INTRAVENOUS at 09:31

## 2022-01-01 RX ADMIN — THERA TABS 1 TABLET: TAB at 17:42

## 2022-01-01 RX ADMIN — NYSTATIN: 100000 POWDER TOPICAL at 05:42

## 2022-01-01 RX ADMIN — PROPOFOL 65 MCG/KG/MIN: 10 INJECTION, EMULSION INTRAVENOUS at 21:43

## 2022-01-01 RX ADMIN — LACTULOSE 30 ML: 20 SOLUTION ORAL at 18:31

## 2022-01-01 RX ADMIN — PANTOPRAZOLE SODIUM 40 MG: 40 INJECTION, POWDER, LYOPHILIZED, FOR SOLUTION INTRAVENOUS at 06:14

## 2022-01-01 RX ADMIN — PROPOFOL 70 MCG/KG/MIN: 10 INJECTION, EMULSION INTRAVENOUS at 09:20

## 2022-01-01 RX ADMIN — PREDNISOLONE 39.9 MG: 15 SOLUTION ORAL at 05:12

## 2022-01-01 RX ADMIN — NOREPINEPHRINE BITARTRATE 30 MCG/MIN: 1 INJECTION, SOLUTION, CONCENTRATE INTRAVENOUS at 20:14

## 2022-01-01 RX ADMIN — OXYMETAZOLINE HCL 2 SPRAY: 0.05 SPRAY NASAL at 05:46

## 2022-01-01 RX ADMIN — LACTULOSE 30 ML: 20 SOLUTION ORAL at 06:14

## 2022-01-01 RX ADMIN — ROCURONIUM BROMIDE 150 MG: 10 INJECTION, SOLUTION INTRAVENOUS at 10:22

## 2022-01-01 RX ADMIN — OMEPRAZOLE 40 MG: KIT at 05:51

## 2022-01-01 RX ADMIN — RIFAXIMIN 550 MG: 550 TABLET ORAL at 05:46

## 2022-01-01 RX ADMIN — NOREPINEPHRINE BITARTRATE 10 MCG/MIN: 1 INJECTION, SOLUTION, CONCENTRATE INTRAVENOUS at 05:41

## 2022-01-01 RX ADMIN — MINERAL OIL, PETROLATUM 1 APPLICATION: 425; 573 OINTMENT OPHTHALMIC at 05:43

## 2022-01-01 RX ADMIN — THERA TABS 1 TABLET: TAB at 17:16

## 2022-01-01 RX ADMIN — MINERAL OIL, PETROLATUM 1 APPLICATION: 425; 573 OINTMENT OPHTHALMIC at 13:37

## 2022-01-01 RX ADMIN — PHYTONADIONE 10 MG: 10 INJECTION, EMULSION INTRAMUSCULAR; INTRAVENOUS; SUBCUTANEOUS at 06:13

## 2022-01-01 RX ADMIN — LACTULOSE 30 ML: 20 SOLUTION ORAL at 01:58

## 2022-01-01 RX ADMIN — PROPOFOL 70 MCG/KG/MIN: 10 INJECTION, EMULSION INTRAVENOUS at 11:50

## 2022-01-01 RX ADMIN — PROPOFOL 65 MCG/KG/MIN: 10 INJECTION, EMULSION INTRAVENOUS at 00:31

## 2022-01-01 RX ADMIN — NOREPINEPHRINE BITARTRATE 20 MCG/MIN: 1 INJECTION, SOLUTION, CONCENTRATE INTRAVENOUS at 09:20

## 2022-01-01 RX ADMIN — THIAMINE HYDROCHLORIDE: 100 INJECTION, SOLUTION INTRAMUSCULAR; INTRAVENOUS at 01:28

## 2022-01-01 RX ADMIN — TRANEXAMIC ACID 1000 MG: 100 INJECTION, SOLUTION INTRAVENOUS at 12:11

## 2022-01-01 RX ADMIN — OCTREOTIDE ACETATE 50 MCG: 100 INJECTION, SOLUTION INTRAVENOUS; SUBCUTANEOUS at 23:14

## 2022-01-01 RX ADMIN — PROPOFOL 70 MCG/KG/MIN: 10 INJECTION, EMULSION INTRAVENOUS at 16:27

## 2022-01-01 RX ADMIN — MINERAL OIL, PETROLATUM 1 APPLICATION: 425; 573 OINTMENT OPHTHALMIC at 13:31

## 2022-01-01 RX ADMIN — NOREPINEPHRINE BITARTRATE 30 MCG/MIN: 1 INJECTION, SOLUTION, CONCENTRATE INTRAVENOUS at 01:31

## 2022-01-01 ASSESSMENT — LIFESTYLE VARIABLES
ON A TYPICAL DAY WHEN YOU DRINK ALCOHOL HOW MANY DRINKS DO YOU HAVE: 0
EVER HAD A DRINK FIRST THING IN THE MORNING TO STEADY YOUR NERVES TO GET RID OF A HANGOVER: YES
DOES PATIENT WANT TO TALK TO SOMEONE ABOUT QUITTING: NO
ALCOHOL_USE: NO
CONSUMPTION TOTAL: POSITIVE
HOW MANY TIMES IN THE PAST YEAR HAVE YOU HAD 5 OR MORE DRINKS IN A DAY: 0
EVER FELT BAD OR GUILTY ABOUT YOUR DRINKING: YES
TOTAL SCORE: 4
DOES PATIENT WANT TO STOP DRINKING: YES
HAVE YOU EVER FELT YOU SHOULD CUT DOWN ON YOUR DRINKING: YES
TOTAL SCORE: 4
DO YOU DRINK ALCOHOL: NO
HAVE PEOPLE ANNOYED YOU BY CRITICIZING YOUR DRINKING: YES
TOTAL SCORE: 4
AVERAGE NUMBER OF DAYS PER WEEK YOU HAVE A DRINK CONTAINING ALCOHOL: 0

## 2022-01-01 ASSESSMENT — PATIENT HEALTH QUESTIONNAIRE - PHQ9
SUM OF ALL RESPONSES TO PHQ9 QUESTIONS 1 AND 2: 0
2. FEELING DOWN, DEPRESSED, IRRITABLE, OR HOPELESS: NOT AT ALL
1. LITTLE INTEREST OR PLEASURE IN DOING THINGS: NOT AT ALL

## 2022-01-01 ASSESSMENT — PAIN DESCRIPTION - PAIN TYPE
TYPE: ACUTE PAIN

## 2022-01-01 ASSESSMENT — COGNITIVE AND FUNCTIONAL STATUS - GENERAL
HELP NEEDED FOR BATHING: A LOT
PERSONAL GROOMING: A LITTLE
DRESSING REGULAR LOWER BODY CLOTHING: A LOT
MOVING TO AND FROM BED TO CHAIR: A LOT
MOVING FROM LYING ON BACK TO SITTING ON SIDE OF FLAT BED: A LOT
WALKING IN HOSPITAL ROOM: A LOT
DRESSING REGULAR UPPER BODY CLOTHING: A LOT
TOILETING: A LOT
SUGGESTED CMS G CODE MODIFIER MOBILITY: CL
MOBILITY SCORE: 12
CLIMB 3 TO 5 STEPS WITH RAILING: A LOT
DAILY ACTIVITIY SCORE: 15
SUGGESTED CMS G CODE MODIFIER DAILY ACTIVITY: CK
TURNING FROM BACK TO SIDE WHILE IN FLAT BAD: A LOT
STANDING UP FROM CHAIR USING ARMS: A LOT

## 2022-01-01 ASSESSMENT — FIBROSIS 4 INDEX
FIB4 SCORE: 4.02
FIB4 SCORE: 4.13
FIB4 SCORE: 2.13
FIB4 SCORE: 2.02
FIB4 SCORE: 4.13
FIB4 SCORE: 4.96

## 2022-02-21 PROBLEM — N17.0 ACUTE RENAL FAILURE WITH TUBULAR NECROSIS (HCC): Status: ACTIVE | Noted: 2022-01-01

## 2022-02-21 PROBLEM — E86.1 HYPOTENSION DUE TO HYPOVOLEMIA: Status: ACTIVE | Noted: 2022-01-01

## 2022-02-21 PROBLEM — K76.82 HEPATIC ENCEPHALOPATHY (HCC): Status: ACTIVE | Noted: 2022-01-01

## 2022-02-21 PROBLEM — D72.823 LEUKEMOID REACTION: Status: ACTIVE | Noted: 2022-01-01

## 2022-02-21 PROBLEM — D62 ACUTE BLOOD LOSS ANEMIA: Status: ACTIVE | Noted: 2022-01-01

## 2022-02-21 PROBLEM — K74.60 DECOMPENSATED HEPATIC CIRRHOSIS (HCC): Status: ACTIVE | Noted: 2022-01-01

## 2022-02-21 PROBLEM — K72.90 DECOMPENSATED HEPATIC CIRRHOSIS (HCC): Status: ACTIVE | Noted: 2022-01-01

## 2022-02-21 PROBLEM — I95.89 HYPOTENSION DUE TO HYPOVOLEMIA: Status: ACTIVE | Noted: 2022-01-01

## 2022-02-21 PROBLEM — F10.10 ALCOHOL ABUSE: Status: ACTIVE | Noted: 2022-01-01

## 2022-02-21 PROBLEM — E83.51 HYPOCALCEMIA: Status: ACTIVE | Noted: 2022-01-01

## 2022-02-21 PROBLEM — E66.01 CLASS 3 SEVERE OBESITY DUE TO EXCESS CALORIES WITHOUT SERIOUS COMORBIDITY IN ADULT (HCC): Status: ACTIVE | Noted: 2022-01-01

## 2022-02-21 PROBLEM — K92.2 GIB (GASTROINTESTINAL BLEEDING): Status: ACTIVE | Noted: 2022-01-01

## 2022-02-22 PROBLEM — Z99.11 ON MECHANICALLY ASSISTED VENTILATION (HCC): Status: ACTIVE | Noted: 2022-01-01

## 2022-02-22 NOTE — ASSESSMENT & PLAN NOTE
Continues to worsen  No significant improvement after albumin  Worsening shock on higher vasopressor needs with levophed and vasopressin  Cultures remain negative  Expand abx from C3 to Zosyn today  Trend Hgb and transfuse if < 7

## 2022-02-22 NOTE — PROCEDURES
Intubation    Date/Time: 2/22/2022 11:50 AM  Performed by: Cody Orellana M.D.  Authorized by: Cody Orellana M.D.     Consent:     Consent obtained:  Verbal    Consent given by:  Parent and patient    Risks discussed:  Brain injury, death and bleeding    Alternatives discussed:  No treatment and delayed treatment  Pre-procedure details:     Patient status:  Altered mental status    Pretreatment meds: Etomidate.    Paralytics:  Rocuronium  Procedure details:     Preoxygenation:  Nonrebreather mask    CPR in progress: no      Intubation method:  Oral    Laryngoscope type:  GlideScope    Laryngoscope size: S4 Blade.    Cormack-Lehane Classification:  Grade 1    Tube size (mm):  8.0    Tube type:  Cuffed    Number of attempts:  1    Ventilation between attempts: no      Cricoid pressure: no      Tube visualized through cords: yes    Placement assessment:     ETT to teeth:  26    Tube secured with:  ETT sanchez    Breath sounds:  Equal and absent over the epigastrium    Placement verification: chest rise, condensation, CXR verification, direct visualization, equal breath sounds and tube exhalation      CXR findings:  ETT in proper place  Post-procedure details:     Patient tolerance of procedure:  Tolerated well, no immediate complications      GlideScope with Hyperangulated S4 blade  8.0 ETT, cuffed  Grade 1 view, 1 attempt  Large neck and blood in airway

## 2022-02-22 NOTE — DISCHARGE PLANNING
Anticipated Discharge Disposition: possible home when medically cleared; dispo pending at this time as patient is intubated and requiring ICU level of care    Action: RN CM reviewed patient chart.  Patient admitted for upper gi bleeding, history of significant alcohol abuse that has been worsening over the past several months. Patient has been attempting to wean himself from alcohol since Lamonte 15.  Family noted patient with worsening confusion, drowsiness, and three days of hematemesis with associated melena and patient was refusing to seek any medical attention per provider notes.  Patient is currently intubated, will need EGD, dialysis line in place and nephro/gi consults.      Barriers to Discharge: medical clearance; ICU level of care    Plan: HCM to remain available for discharge planning needs and barriers

## 2022-02-22 NOTE — ED NOTES
Pt transported by jere to floor with Geno LAMA and adrián pt on monitor, not in distress at this time. IV fluids infusing.

## 2022-02-22 NOTE — CONSULTS
[facility] -  CONSULTATION NOTE               Author: Alondra Kent M.D. Date & Time: 2/22/2022  1:31 PM       REASON FOR CONSULTATION:   Acute Renal Failure    CHIEF COMPLAINT:   Acute GIB, decompesaated cirrhosis     HISTORY OF PRESENT ILLNESS:    37 year old male with medical H/o Alcohol abuse for the last four years, Obesity and Hypertension is admitted on 02/21 with Acute Upper GI bleed and decompensated cirrhosis. He has been having hematemesis and melena for the last one day and he has not seeked medical attention.  As per notes, patient drinks one pint of vodka daily. No H/o of NSAID's or other anticoagulation. He was admitted to ICU for further management. This morning, patient was intubated as he was desaturating and encephalopathy and also with the need for endoscopy.    He was started on PPI, octreotide, Midodrine and vasopresors for blood support. Labs showed Hb of 9.8, BUN/creatinine of 99/6.28. Ultrasound of abdomen showed no hydronephrosis. Ammonia is elevated at 151. Chets x ray showed pulmonary infiltrates.     REVIEW OF SYSTEMS:    Unable to obtain as the patient is intubated.    PAST MEDICAL HISTORY:   History reviewed. No pertinent past medical history.    PAST SURGICAL HISTORY:    No past surgical history on file.    FAMILY HISTORY:   No family history of renal disease    SOCIAL HISTORY:   - No tobacco, No EtOH, No illicits    HOME MEDICATIONS:   No current facility-administered medications on file prior to encounter.     No current outpatient medications on file prior to encounter.       ALLERGIES:  Patient has no known allergies.    PHYSICAL EXAM:  VS:  BP (!) 92/46   Pulse 66   Temp 36.1 °C (96.9 °F) (Temporal)   Resp (!) 22   Ht 1.829 m (6')   Wt (!) 193 kg (424 lb 9.7 oz)   SpO2 94%   BMI 57.59 kg/m²   GENERAL: intubated, scleral icterus noted  CV: RRR, no murmurs heard  RESP: decreased breath sounds at the bases   GI: Soft  MSK: No joint deformities    SKIN: No concerning rashes  Extremities: generalized swelling noted with 2+ lower extremity edema present  NEURO: Unable to assess, moves extremities spontaneously    LABS:  Recent Results (from the past 24 hour(s))   COD (ADULT)    Collection Time: 02/21/22  8:45 PM   Result Value Ref Range    ABO Grouping Only B     Rh Grouping Only POS     Antibody Screen-Cod NEG    ABO Rh Confirm    Collection Time: 02/21/22  8:45 PM   Result Value Ref Range    ABO Rh Confirm B POS    CBC With Differential    Collection Time: 02/21/22  8:48 PM   Result Value Ref Range    WBC 23.2 (H) 4.8 - 10.8 K/uL    RBC 3.23 (L) 4.70 - 6.10 M/uL    Hemoglobin 10.8 (L) 14.0 - 18.0 g/dL    Hematocrit 31.3 (L) 42.0 - 52.0 %    MCV 96.9 81.4 - 97.8 fL    MCH 33.4 (H) 27.0 - 33.0 pg    MCHC 34.5 33.7 - 35.3 g/dL    RDW 67.7 (H) 35.9 - 50.0 fL    Platelet Count 250 164 - 446 K/uL    MPV 10.9 9.0 - 12.9 fL    Neutrophils-Polys 84.50 (H) 44.00 - 72.00 %    Lymphocytes 5.30 (L) 22.00 - 41.00 %    Monocytes 7.80 0.00 - 13.40 %    Eosinophils 0.40 0.00 - 6.90 %    Basophils 0.70 0.00 - 1.80 %    Immature Granulocytes 1.30 (H) 0.00 - 0.90 %    Nucleated RBC 0.00 /100 WBC    Neutrophils (Absolute) 19.57 (H) 1.82 - 7.42 K/uL    Lymphs (Absolute) 1.24 1.00 - 4.80 K/uL    Monos (Absolute) 1.81 (H) 0.00 - 0.85 K/uL    Eos (Absolute) 0.10 0.00 - 0.51 K/uL    Baso (Absolute) 0.17 (H) 0.00 - 0.12 K/uL    Immature Granulocytes (abs) 0.29 (H) 0.00 - 0.11 K/uL    NRBC (Absolute) 0.00 K/uL    Anisocytosis 2+ (A)     Macrocytosis 2+ (A)    Comp Metabolic Panel    Collection Time: 02/21/22  8:48 PM   Result Value Ref Range    Sodium 135 135 - 145 mmol/L    Potassium 4.3 3.6 - 5.5 mmol/L    Chloride 100 96 - 112 mmol/L    Co2 16 (L) 20 - 33 mmol/L    Anion Gap 19.0 (H) 7.0 - 16.0    Glucose 87 65 - 99 mg/dL    Bun 91 (HH) 8 - 22 mg/dL    Creatinine 5.69 (HH) 0.50 - 1.40 mg/dL    Calcium 8.4 (L) 8.5 - 10.5 mg/dL    AST(SGOT) 135 (H) 12 - 45 U/L    ALT(SGPT) 79 (H) 2 -  50 U/L    Alkaline Phosphatase 229 (H) 30 - 99 U/L    Total Bilirubin 26.4 (H) 0.1 - 1.5 mg/dL    Albumin 2.1 (L) 3.2 - 4.9 g/dL    Total Protein 6.5 6.0 - 8.2 g/dL    Globulin 4.4 (H) 1.9 - 3.5 g/dL    A-G Ratio 0.5 g/dL   Prothrombin Time    Collection Time: 02/21/22  8:48 PM   Result Value Ref Range    PT 21.7 (H) 12.0 - 14.6 sec    INR 1.96 (H) 0.87 - 1.13   ESTIMATED GFR    Collection Time: 02/21/22  8:48 PM   Result Value Ref Range    GFR If  14 (A) >60 mL/min/1.73 m 2    GFR If Non African American 11 (A) >60 mL/min/1.73 m 2   HEPATITIS PANEL ACUTE(4 COMPONENTS)    Collection Time: 02/21/22  8:48 PM   Result Value Ref Range    Hepatitis B Surface Antigen Non-Reactive Non-Reactive    Hepatitis B Cors Ab,IgM Non-Reactive Non-Reactive    Hepatitis A Virus Ab, IgM Non-Reactive Non-Reactive    Hepatitis C Antibody Non-Reactive Non-Reactive   PERIPHERAL SMEAR REVIEW    Collection Time: 02/21/22  8:48 PM   Result Value Ref Range    Peripheral Smear Review see below    PLATELET ESTIMATE    Collection Time: 02/21/22  8:48 PM   Result Value Ref Range    Plt Estimation Normal    MORPHOLOGY    Collection Time: 02/21/22  8:48 PM   Result Value Ref Range    RBC Morphology Present     Poikilocytosis 2+     Schistocytes 1+     Target Cells 1+     Echinocytes 2+    DIFFERENTIAL COMMENT    Collection Time: 02/21/22  8:48 PM   Result Value Ref Range    Comments-Diff see below    Blood Culture    Collection Time: 02/21/22  9:11 PM    Specimen: Peripheral; Blood   Result Value Ref Range    Significant Indicator NEG     Source BLD     Site PERIPHERAL     Culture Result       No Growth  Note: Blood cultures are incubated for 5 days and  are monitored continuously.Positive blood cultures  are called to the RN and reported as soon as  they are identified.     LIPASE    Collection Time: 02/21/22  9:11 PM   Result Value Ref Range    Lipase 31 11 - 82 U/L   PROCALCITONIN    Collection Time: 02/21/22  9:11 PM   Result  Value Ref Range    Procalcitonin 3.27 (H) <0.25 ng/mL   LACTIC ACID    Collection Time: 02/21/22  9:11 PM   Result Value Ref Range    Lactic Acid 2.1 (H) 0.5 - 2.0 mmol/L   DIAGNOSTIC ALCOHOL    Collection Time: 02/21/22  9:11 PM   Result Value Ref Range    Diagnostic Alcohol <10.1 0.0 - 10.0 mg/dL   MAGNESIUM    Collection Time: 02/21/22  9:11 PM   Result Value Ref Range    Magnesium 2.9 (H) 1.5 - 2.5 mg/dL   PHOSPHORUS    Collection Time: 02/21/22  9:11 PM   Result Value Ref Range    Phosphorus 6.9 (H) 2.5 - 4.5 mg/dL   IONIZED CALCIUM    Collection Time: 02/21/22  9:11 PM   Result Value Ref Range    Ionized Calcium 1.0 (L) 1.1 - 1.3 mmol/L   AMMONIA    Collection Time: 02/21/22  9:11 PM   Result Value Ref Range    Ammonia 151 (HH) 11 - 45 umol/L   Blood Culture    Collection Time: 02/21/22 10:11 PM    Specimen: Peripheral; Blood   Result Value Ref Range    Significant Indicator NEG     Source BLD     Site PERIPHERAL     Culture Result       No Growth  Note: Blood cultures are incubated for 5 days and  are monitored continuously.Positive blood cultures  are called to the RN and reported as soon as  they are identified.     PLATELET MAPPING WITH BASIC TEG    Collection Time: 02/21/22 10:19 PM   Result Value Ref Range    Reaction Time Initial-R 13.2 (H) 4.6 - 9.1 min    React Time Initial Hep 12.7 (H) 4.3 - 8.3 min    Clot Kinetics-K 1.3 0.8 - 2.1 min    Clot Angle-Angle 74.1 63.0 - 78.0 degrees    Maximum Clot Strength-MA 70.3 (H) 52.0 - 69.0 mm    TEG Functional Fibrinogen(MA) 41.0 (H) 15.0 - 32.0 mm    Lysis 30 minutes-LY30 0.0 0.0 - 2.6 %    % Inhibition ADP see comment 0.0 - 17.0 %    % Inhibition AA see comment 0.0 - 11.0 %    TEG Algorithm Link Algorithm    FRESH FROZEN PLASMA    Collection Time: 02/22/22  4:25 AM   Result Value Ref Range    Component F       TA4                 Thawed Plasma 4     [id number]   issued       02/22/22   05:18      Product Type Thawed Apheresis Plasma 4th Cont     Dispense  Status issued     Unit Number (Barcoded) [id number]     Product Code (Barcoded) E2826B07     Blood Type (Barcoded) 7300     Component F       TA1                 Thawed Plasma 1     [id number]   issued       02/22/22   06:05      Product Type Thawed Apheresis Plasma 1st Cont     Dispense Status issued     Unit Number (Barcoded) [id number]     Product Code (Barcoded) P9154P89     Blood Type (Barcoded) 8400    CBC with Differential    Collection Time: 02/22/22  4:35 AM   Result Value Ref Range    WBC 22.0 (H) 4.8 - 10.8 K/uL    RBC 2.91 (L) 4.70 - 6.10 M/uL    Hemoglobin 9.8 (L) 14.0 - 18.0 g/dL    Hematocrit 28.1 (L) 42.0 - 52.0 %    MCV 96.6 81.4 - 97.8 fL    MCH 33.7 (H) 27.0 - 33.0 pg    MCHC 34.9 33.7 - 35.3 g/dL    RDW 68.1 (H) 35.9 - 50.0 fL    Platelet Count 221 164 - 446 K/uL    MPV 10.6 9.0 - 12.9 fL    Neutrophils-Polys 82.20 (H) 44.00 - 72.00 %    Lymphocytes 7.30 (L) 22.00 - 41.00 %    Monocytes 8.20 0.00 - 13.40 %    Eosinophils 0.40 0.00 - 6.90 %    Basophils 0.50 0.00 - 1.80 %    Immature Granulocytes 1.40 (H) 0.00 - 0.90 %    Nucleated RBC 0.00 /100 WBC    Neutrophils (Absolute) 18.12 (H) 1.82 - 7.42 K/uL    Lymphs (Absolute) 1.60 1.00 - 4.80 K/uL    Monos (Absolute) 1.80 (H) 0.00 - 0.85 K/uL    Eos (Absolute) 0.08 0.00 - 0.51 K/uL    Baso (Absolute) 0.12 0.00 - 0.12 K/uL    Immature Granulocytes (abs) 0.30 (H) 0.00 - 0.11 K/uL    NRBC (Absolute) 0.00 K/uL   Comp Metabolic Panel (CMP)    Collection Time: 02/22/22  4:35 AM   Result Value Ref Range    Sodium 134 (L) 135 - 145 mmol/L    Potassium 4.6 3.6 - 5.5 mmol/L    Chloride 101 96 - 112 mmol/L    Co2 18 (L) 20 - 33 mmol/L    Anion Gap 15.0 7.0 - 16.0    Glucose 96 65 - 99 mg/dL    Bun 99 (HH) 8 - 22 mg/dL    Creatinine 6.28 (HH) 0.50 - 1.40 mg/dL    Calcium 8.4 (L) 8.5 - 10.5 mg/dL    AST(SGOT) 116 (H) 12 - 45 U/L    ALT(SGPT) 73 (H) 2 - 50 U/L    Alkaline Phosphatase 220 (H) 30 - 99 U/L    Total Bilirubin 27.0 (H) 0.1 - 1.5 mg/dL     Albumin 2.0 (L) 3.2 - 4.9 g/dL    Total Protein 6.0 6.0 - 8.2 g/dL    Globulin 4.0 (H) 1.9 - 3.5 g/dL    A-G Ratio 0.5 g/dL   Magnesium    Collection Time: 02/22/22  4:35 AM   Result Value Ref Range    Magnesium 3.1 (H) 1.5 - 2.5 mg/dL   PHOSPHORUS    Collection Time: 02/22/22  4:35 AM   Result Value Ref Range    Phosphorus 8.5 (H) 2.5 - 4.5 mg/dL   Prothrombin Time    Collection Time: 02/22/22  4:35 AM   Result Value Ref Range    PT 22.2 (H) 12.0 - 14.6 sec    INR 2.01 (H) 0.87 - 1.13   ESTIMATED GFR    Collection Time: 02/22/22  4:35 AM   Result Value Ref Range    GFR If  12 (A) >60 mL/min/1.73 m 2    GFR If Non African American 10 (A) >60 mL/min/1.73 m 2   POCT arterial blood gas device results    Collection Time: 02/22/22 12:10 PM   Result Value Ref Range    Ph 7.334 (L) 7.400 - 7.500    Pco2 34.0 26.0 - 37.0 mmHg    Po2 66 64 - 87 mmHg    Tco2 19 (L) 20 - 33 mmol/L    S02 92 (L) 93 - 99 %    Hco3 18.1 17.0 - 25.0 mmol/L    BE -7 (L) -4 - 3 mmol/L    Body Temp see below degrees    O2 Therapy 80 %    iPF Ratio 83     Specimen Arterial     DelSys Vent     Tidal Volume 430 mL    Peep End Expiratory Pressure 12 cmh20    Set Rate 24     Mode APV-CMV    HGB (Hemoglobin) for 48 hours    Collection Time: 02/22/22 12:45 PM   Result Value Ref Range    Hemoglobin 9.1 (L) 14.0 - 18.0 g/dL       (click the triangle to expand results)    IMAGING:  DX-CHEST-LIMITED (1 VIEW)   Final Result      1.  Interval placement of endotracheal tube and RIGHT internal jugular catheter as described above.   2.  Persistent hypoinflation with worsening RIGHT upper lobe atelectasis.   3.  No pneumothorax.      US-ABDOMEN COMPLETE SURVEY   Final Result         1.  Hepatomegaly and echogenic liver, compatible with fatty change versus fibrosis.   2.  Thickened gallbladder wall, however gallbladder is contracted and finding is likely related to contracted gallbladder state.   3.  Technically limited evaluation due to patient body  habitus and shadowing bowel gas.         DX-CHEST-PORTABLE (1 VIEW)   Final Result         1.  Pulmonary edema and/or infiltrates.          ASSESSMENT:  # CHI: Unclear Baseline Cr, increased to 5.69--> 6.52 . Likely 2/2 pre renal and ATN with GI bleeding and Hypotension. Cannot rule out hepatorenal syndrome, however would treat other possible etiologies first.   # HTN  # Acute Upper GI bleeding   # Decompensated hepatic cirrhosis  #Acute anemia   #Hepatic encephalopathy    PLAN:  -Patient needs RRT today/tomorrow given volume overload and encephalopathy.  Advised the team to place a dialysis catheter.   -Continue albumin, midodrine and octreotide.  -UA ordered for analysis  -Renal diet  -Strict I/Os  -Dose all meds per eGFR < 10  -Rest of the management as per primary team     Thank you for this consult, we will continue to follow.

## 2022-02-22 NOTE — ASSESSMENT & PLAN NOTE
Intubated date: 2/22/22  Goal saturation > 88%  Monitor ventilator waveforms and titrate flow/peep and volumes according.   Lung protective ventilation strategy  CXR PRN: monitor lung volumes and tube/line placement  VAP bundle prevention, oral care, post pyloric feeding  Head of bed > 30 degree  GI prophylaxis: PPI BID  Daily awakening and SBT trials unless contraindicated  Assess / Treat pain  Assess / Treat delirium  Sedation Goal: RASS 0 to -1  Monitor for liberation / early mobility  Respiratory treatments: prn  ABCDEF Bundle     Oxygen requirements remains significantly high  HD per nephrology   Now on PEEP 18 FiO2 100%<-->90%

## 2022-02-22 NOTE — PROGRESS NOTES
"Critical Care Progress Note    Date of admission  2/21/2022    Chief Complaint  \"37 y.o. male who presented 2/21/2022 with a past medical history significant for alcohol abuse for the last 4+ years worsening over the last several months after he lost his job as well as obesity and hypertension who has not seen a physician or take any medications for many years.  He is brought in by EMS at the request of his family for worsening confusion, drowsiness, and 3 days of hematemesis with associated melena.  His 15-year-old son who is at bedside and lives with his father, reports that patient has been vomiting approximately 2-3 times per day a moderate amount of blood and is having 1-2 melanotic stools per day worsening over the last 24 hours.  Patient has been refusing to seek medical attention.  The patient reports stopping alcohol approximately 3 weeks ago and normally drinks a pint of vodka per day.  He does not take any nonsteroidal anti-inflammatory drugs nor use any anticoagulation medication.  He denies any prior history of gastrointestinal hemorrhage nor has he been evaluated by a gastroenterologist for liver disease in the past.  He states that he has had decreased urine output over the last 24 hours.  EMS found him with significant blood around him (1 L of bright red blood) and borderline hypotension.  He was evaluated by the emergency physician as well as hospitalist and found to be in multiorgan failure in the setting of GI bleed and requested a consultation for ICU admission and critical care management.  GI was not consulted in the ED as the emergency physicians have been requested to \"not consult to gastroenterology in the middle the night unless an emergent procedure needs to be done.\" - Dr Gonda 2/21/22    Hospital Course  2/21 - admitted overnight to ICU  2/22 - Intubated for HE and need for EGD, dialysis line placed, Nephro/GI consulted.  Vent Day 1      Interval Problem Update  Reviewed last 24 hour " events:  Worsening hepatic encephalopathy throughout the day  Increasingly somnolent  GI/nephro consulted  Intubated  Try Alysis catheter placed  Planning on dialysis today or tomorrow  EGD planning for today    Review of Systems  Review of Systems   Unable to perform ROS: Intubated        Vital Signs for last 24 hours   Temp:  [36.1 °C (96.9 °F)-36.6 °C (97.9 °F)] 36.1 °C (96.9 °F)  Pulse:  [66-80] 66  Resp:  [14-59] 22  BP: ()/(40-61) 92/44  SpO2:  [84 %-100 %] 92 %    Hemodynamic parameters for last 24 hours       Respiratory Information for the last 24 hours  Vent Mode: APVCMV  Rate (breaths/min): 22  Vt Target (mL): 430  PEEP/CPAP: 12  MAP: 13  Control VTE (exp VT): 425    Physical Exam   Physical Exam  Vitals and nursing note reviewed.   Constitutional:       General: He is not in acute distress.     Appearance: He is morbidly obese. He is ill-appearing and toxic-appearing.   HENT:      Head: Normocephalic.      Nose: Nose normal.      Mouth/Throat:      Comments: Dried blood around teeth and mouth  Eyes:      General: Scleral icterus present.   Cardiovascular:      Rate and Rhythm: Normal rate and regular rhythm.      Pulses: Normal pulses.   Pulmonary:      Effort: Pulmonary effort is normal. No respiratory distress.   Abdominal:      General: There is no distension.      Palpations: Abdomen is soft.      Tenderness: There is no abdominal tenderness. There is no guarding or rebound.   Musculoskeletal:         General: Swelling present. Normal range of motion.      Cervical back: Normal range of motion. No rigidity.   Skin:     General: Skin is warm.      Coloration: Skin is jaundiced.   Neurological:      Mental Status: He is lethargic, disoriented and confused.      GCS: GCS eye subscore is 2. GCS verbal subscore is 4. GCS motor subscore is 5.         Medications  Current Facility-Administered Medications   Medication Dose Route Frequency Provider Last Rate Last Admin   • NS infusion   Intravenous  Continuous Jeremy M Gonda, M.D. 30 mL/hr at 02/22/22 0613 New Bag at 02/22/22 0613   • nystatin (MYCOSTATIN) powder   Topical TID Jeremy M Gonda, M.D.   Given at 02/22/22 1138   • norepinephrine (Levophed) 8 mg in 250 mL NS infusion (premix)  0-30 mcg/min Intravenous Continuous Cody Orellana M.D. 9.4 mL/hr at 02/22/22 1145 5 mcg/min at 02/22/22 1145   • fentaNYL (SUBLIMAZE) 50 mcg/mL in 50mL   Intravenous Continuous Cody Orellana M.D. 1 mL/hr at 02/22/22 1044 50 mcg/hr at 02/22/22 1044   • propofol (DIPRIVAN) injection  0-80 mcg/kg/min Intravenous Continuous Cody Orellana M.D. 17.4 mL/hr at 02/22/22 1105 15 mcg/kg/min at 02/22/22 1105   • lactated ringer BOLUS infusion  1,000 mL Intravenous Once PRN Cody Orellana M.D.       • MD Alert...ICU Electrolyte Replacement per Pharmacy   Other PHARMACY TO DOSE Cody Orellana M.D.       • lidocaine (XYLOCAINE) 1 % injection 2 mL  2 mL Tracheal Tube Q30 MIN PRN Cody Orellana M.D.       • NS infusion   Intravenous CONTINUOUS (1600 Start) Jeremy M Gonda, M.D. 100 mL/hr at 02/22/22 0227 New Bag at 02/22/22 0227   • octreotide (SANDOSTATIN) 1,250 mcg in  mL Infusion  50 mcg/hr Intravenous Continuous Jeremy M Gonda, M.D. 10 mL/hr at 02/21/22 2323 50 mcg/hr at 02/21/22 2323   • pantoprazole (Protonix) injection 40 mg  40 mg Intravenous BID Jeremy M Gonda, M.D.   40 mg at 02/22/22 0614   • cefTRIAXone (Rocephin) syringe 2 g  2 g Intravenous Q EVENING Jeremy M Gonda, M.D.   2 g at 02/21/22 2314   • Pharmacy Consult Request   Other PHARMACY TO DOSE Jeremy M Gonda, M.D.       • thiamine (Vitamin B-1) tablet 100 mg  100 mg Oral Q EVENING Jeremy M Gonda, M.D.        And   • folic acid (FOLVITE) tablet 1 mg  1 mg Oral Q EVENING Jeremy M Gonda, M.D.        And   • multivitamin (THERAGRAN) tablet 1 Tablet  1 Tablet Oral Q EVENING Jeremy M Gonda, M.D.       • phytonadione (AQUA-MEPHYTON) 10 mg in NS 50 mL IVPB  10 mg Intravenous DAILY Jeremy M Gonda, M.D.   Stopped at 02/22/22 0792    • ondansetron (ZOFRAN) syringe/vial injection 4 mg  4 mg Intravenous Q4HRS PRN Jeremy M Gonda, M.D.       • ondansetron (ZOFRAN ODT) dispertab 4 mg  4 mg Oral Q4HRS PRN Jeremy M Gonda, M.D.       • promethazine (PHENERGAN) tablet 12.5-25 mg  12.5-25 mg Oral Q4HRS PRN Jeremy M Gonda, M.D.       • promethazine (PHENERGAN) suppository 12.5-25 mg  12.5-25 mg Rectal Q4HRS PRN Jeremy M Gonda, M.D.       • prochlorperazine (COMPAZINE) injection 5-10 mg  5-10 mg Intravenous Q4HRS PRN Jeremy M Gonda, M.D.       • lactulose 20 GM/30ML solution 30 mL  30 mL Oral TID Jeremy M Gonda, M.D.   30 mL at 02/22/22 0614   • riFAXIMin (XIFAXAN) tablet 550 mg  550 mg Oral BID Jeremy M Gonda, M.D.   550 mg at 02/21/22 2355   • midodrine (PROAMATINE) tablet 10 mg  10 mg Oral TID WITH MEALS Jeremy M Gonda, M.D.   10 mg at 02/22/22 0730   • albumin human 25% solution 25 g  25 g Intravenous Q6HRS Jeremy M Gonda, M.D. 150 mL/hr at 02/22/22 1142 25 g at 02/22/22 1142       Fluids    Intake/Output Summary (Last 24 hours) at 2/22/2022 1241  Last data filed at 2/22/2022 0645  Gross per 24 hour   Intake 3022.5 ml   Output 0 ml   Net 3022.5 ml       Laboratory  Recent Labs     02/22/22  1210   ISTATAPH 7.334*   ISTATAPCO2 34.0   ISTATAPO2 66   ISTATATCO2 19*   KGWHFQC0LUP 92*   ISTATARTHCO3 18.1   ISTATARTBE -7*   ISTATTEMP see below   ISTATFIO2 80   ISTATSPEC Arterial         Recent Labs     02/21/22  2048 02/21/22 2111 02/22/22 0435   SODIUM 135  --  134*   POTASSIUM 4.3  --  4.6   CHLORIDE 100  --  101   CO2 16*  --  18*   BUN 91*  --  99*   CREATININE 5.69*  --  6.28*   MAGNESIUM  --  2.9* 3.1*   PHOSPHORUS  --  6.9* 8.5*   CALCIUM 8.4*  --  8.4*     Recent Labs     02/21/22 2048 02/21/22 2111 02/22/22 0435   ALTSGPT 79*  --  73*   ASTSGOT 135*  --  116*   ALKPHOSPHAT 229*  --  220*   TBILIRUBIN 26.4*  --  27.0*   LIPASE  --  31  --    GLUCOSE 87  --  96     Recent Labs     02/21/22 2048 02/22/22 0435   WBC 23.2* 22.0*   NEUTSPOLYS  84.50* 82.20*   LYMPHOCYTES 5.30* 7.30*   MONOCYTES 7.80 8.20   EOSINOPHILS 0.40 0.40   BASOPHILS 0.70 0.50   ASTSGOT 135* 116*   ALTSGPT 79* 73*   ALKPHOSPHAT 229* 220*   TBILIRUBIN 26.4* 27.0*     Recent Labs     02/21/22 2048 02/22/22  0435   RBC 3.23* 2.91*   HEMOGLOBIN 10.8* 9.8*   HEMATOCRIT 31.3* 28.1*   PLATELETCT 250 221   PROTHROMBTM 21.7* 22.2*   INR 1.96* 2.01*       Imaging  X-Ray:  I have personally reviewed the images and compared with prior images. and My impression is: Right upper lobe atelectasis, endotracheal tube and right IJ Trialysis line in appropriate position  Ultrasound:  Reviewed    Assessment/Plan  * GIB (gastrointestinal bleeding)- (present on admission)  Assessment & Plan  Upper gastrointestinal hemorrhage -DDx includes esophageal/gastric varices vs ulcer disease vs esophagitis/gastritis vs malignancy  Serial CBC with conservative transfusion strategy  Begin IV octreotide infusion with bolus  IV PPI twice daily  IV Rocephin x7 days  Keep n.p.o.  GI consultation for EGD  Large bore IV access, IV fluid resuscitation  Check TEG/platelet mapping    GI to scope today    Hepatic encephalopathy (HCC)  Assessment & Plan  Begin lactulose and rifaximin  Avoid sedatives  Close neurologic monitoring    Now intubated for worsening mental status and facilitation of EGD  Over the course of the morning he became gradually more somnolent and may require prolonged intubation until HE is resolved    Acute renal failure with tubular necrosis (HCC)  Assessment & Plan  Urine output very low  Likely HRS unfortunately  Dialysis catheter placed  Nephrology following planning for dialysis today/tomorrow unless improvement  Aggressive IV fluid resuscitation, albumin 100 g/day x 48 hours, midodrine, octreotide  Avoid nephrotoxins  Monitor creatinine, urine output, electrolytes closely    Alcohol abuse  Assessment & Plan  Significant alcohol abuse with most recent consumption reported 3 weeks ago  Monitor for  alcohol withdrawal syndrome using RASS with as needed benzodiazepines  Vitamin supplementation  Alcohol cessation education and resources to be provided    Decompensated hepatic cirrhosis (HCC)  Assessment & Plan  Alcoholic cirrhosis decompensated with associated hepatic encephalopathy, metabolic acidosis, acute kidney injury, GI bleed  Meld score 40, Madrey discriminatory function of 69 now  Monitor synthetic function  Avoid hepatotoxins  Palliative care consultation, poor prognosis unfortunately    After GI and Nephrology have seen him, will discuss with transplant centers, however he is unlikely to be a candidate    On mechanically assisted ventilation (HCC)  Assessment & Plan  Intubated date: 2/22/22  Goal saturation > 88%  Monitor ventilator waveforms and titrate flow/peep and volumes according.   Lung protective ventilation strategy  CXR PRN: monitor lung volumes and tube/line placement  VAP bundle prevention, oral care, post pyloric feeding  Head of bed > 30 degree  GI prophylaxis: PPI BID  Daily awakening and SBT trials unless contraindicated  Assess / Treat pain  Assess / Treat delirium  Sedation Goal: RASS 0 to -1  Monitor for liberation / early mobility  Respiratory treatments: prn  ABCDEF Bundle     Hypotension due to hypovolemia  Assessment & Plan  Now on norepinephrine  Midodrine    Albumin 1g/kg x2 days for potential HRS    Trend Hgb and transfuse >7    Class 3 severe obesity due to excess calories without serious comorbidity in adult (HCC)  Assessment & Plan  Encourage behavioral and dietary modification for weight loss  RD consultation    Acute blood loss anemia  Assessment & Plan  Secondary to gastrointestinal hemorrhage  Serial CBC with conservative transfusion strategy targeting a hemoglobin goal greater than 7    Hypocalcemia  Assessment & Plan  Repletion with IV calcium gluconate  Monitor    Leukemoid reaction  Assessment & Plan  Likely reactive at this time  On ceftriaxone  Monitor       VTE:   Contraindicated  Ulcer: PPI BID for GI Bleed  Lines: Central Line  Ongoing indication addressed and Fernández Catheter  Ongoing indication addressed    I have performed a physical exam and reviewed and updated ROS and Plan today (2/22/2022). In review of yesterday's note (2/21/2022), there are no changes except as documented above.     Discussed patient condition and risk of morbidity and/or mortality with Family, RN, RT, Pharmacy, Code status disscussed, Charge nurse / hot rounds, Patient and GI and nephrology     The patient remains critically ill.  He is intubated and on mechanical ventilation.  Critical care time = 96 minutes in directly providing and coordinating critical care and extensive data review.  No time overlap and excludes procedures.

## 2022-02-22 NOTE — ASSESSMENT & PLAN NOTE
Was stable for several days  Continues to be quite elevated at 45 today  Blood cultures x2 --> all negative  Continue Abx-->change ceftriaxone to Zosyn

## 2022-02-22 NOTE — PROCEDURES
Date: 02/22/22   Time: 2:39 PM     Procedure: Bronchoscopy with bronchoalveolar lavage and therapeutic aspiration of secretions    Indication: Increasing oxygen requirements, collapse of RUL  Consent: Informed consent obtained from patient or designated decision maker after explaining the benefits/risks of the procedure including but not limited to bleeding, infection, airway trauma or loss thereof, pneumothorax/hemothorax, arrythmia, or death. Patient or surrogate expressed understanding and agreement and signed consent which can be found in the patient's chart.    Procedure: After obtaining consent, a time-out was performed. Respiratory therapy and nursing at bedside throughout procedure. Patient provided sedation and analgesia throughout the procedure. Placed on full ventilator support with an FiO2 of 100% throughout the procedure. Using a fiberoptic bronchoscope, trachea entered via ETT.  Airways visualized directly and the following intervention was performed: diagnostic and therapeutic lavage with all areas of lungs suctioned to clear.     Findings as below. Patient tolerated procedure well without any difficulties and left in care of bedside nurse/RT.     Medications: Rocuronium, Propofol, Fentanyl    Findings: Upper airway - Not visualized as bronchoscope passed through ETT.        Trachea to grzegorz - normal appearing mucosa without lesions or mass, ETT tip measured 2 cm from the grzegorz.        R proximal and distal airways - normal appearing mucosa without mass/lesion/anatomic variance, secretions: moderate, thin and clear.  RUL occluded with secretions which were washed and suctioned clear.  RML and RLL also with moderate thin secretions that were suctioned to clear        L proximal and distal airways - normal appearing mucosa without mass/lesion/anatomic variance, secretions: moderate, thin and clear throughout the PEDRO, Lingula, LLL        Samples - None    Complications: None

## 2022-02-22 NOTE — DIETARY
NUTRITION SERVICES: BMI - Pt with BMI >40 (=Body mass index is 57.59 kg/m².), Class III obesity. Weight loss counseling not appropriate in acute care setting. RECOMMEND - If appropriate at DC please refer to outpatient services for weight management. RD available PRN.

## 2022-02-22 NOTE — PROGRESS NOTES
Dr. Orellana at bedside for bronchoscopy.   Patient oxygen saturation needs increasing since intubation.

## 2022-02-22 NOTE — CONSULTS
"Critical Care Consultation/H&P    Date of consult: 2/21/2022    Referring Physician  Dr. Johnnie Lombardi DO    Reason for Consultation  GIB, decompensated cirrhosis    History of Presenting Illness  37 y.o. male who presented 2/21/2022 with a past medical history significant for alcohol abuse for the last 4+ years worsening over the last several months after he lost his job as well as obesity and hypertension who has not seen a physician or take any medications for many years.  He is brought in by EMS at the request of his family for worsening confusion, drowsiness, and 3 days of hematemesis with associated melena.  His 15-year-old son who is at bedside and lives with his father, reports that patient has been vomiting approximately 2-3 times per day a moderate amount of blood and is having 1-2 melanotic stools per day worsening over the last 24 hours.  Patient has been refusing to seek medical attention.  The patient reports stopping alcohol approximately 3 weeks ago and normally drinks a pint of vodka per day.  He does not take any nonsteroidal anti-inflammatory drugs nor use any anticoagulation medication.  He denies any prior history of gastrointestinal hemorrhage nor has he been evaluated by a gastroenterologist for liver disease in the past.  He states that he has had decreased urine output over the last 24 hours.  EMS found him with significant blood around him (1 L of bright red blood) and borderline hypotension.  He was evaluated by the emergency physician as well as hospitalist and found to be in multiorgan failure in the setting of GI bleed and requested a consultation for ICU admission and critical care management.  GI was not consulted in the ED as the emergency physicians have been requested to \"not consult to gastroenterology in the middle the night unless an emergent procedure needs to be done.\"    Code Status  Full Code    Review of Systems  Review of Systems   Unable to perform ROS: Mental status change "       Past Medical History   has no past medical history on file. -Obesity, hypertension, alcohol abuse    Surgical History   has no past surgical history on file. -None    Family History  family history is not on file. -Diabetes, hypothyroidism, hypertension    Social History   reports that he has been smoking. He has never used smokeless tobacco. He reports previous alcohol use. He reports current drug use. -Patient drinks 1 pint of alcohol per day, uses marijuana frequently, denies tobacco abuse.  He has 15-year-old and 8-year-old sons at home and is currently estranged from his wife.  His mother who lives 20 miles away is at bedside providing additional history.    Medications  Home Medications     Reviewed by Modesta Gil (Pharmacy Tech) on 02/21/22 at 2132  Med List Status: Complete   Medication Last Dose Status        Patient Arron Taking any Medications                     Current Facility-Administered Medications   Medication Dose Route Frequency Provider Last Rate Last Admin   • NS infusion   Intravenous Once Jeremy M Gonda, M.D.       • octreotide (SANDOSTATIN) injection 50 mcg  50 mcg Intravenous Once Jeremy M Gonda, M.D.       • octreotide (SANDOSTATIN) 1,250 mcg in  mL Infusion  50 mcg/hr Intravenous Continuous Jeremy M Gonda, M.D.       • pantoprazole (Protonix) injection 40 mg  40 mg Intravenous BID Jeremy M Gonda, M.D.       • cefTRIAXone (Rocephin) syringe 1 g  1 g Intravenous Q24HRS Jeremy M Gonda, M.D.       • Pharmacy Consult Request   Other PHARMACY TO DOSE Jeremy M Gonda, M.D.       • detox IV 1000 mL (D5LR + magnesium 1 g + thiamine 100 mg + folic acid 1 mg) infusion   Intravenous Once Jeremy M Gonda, M.D.        And   • [START ON 2/22/2022] thiamine (Vitamin B-1) tablet 100 mg  100 mg Oral DAILY Jeremy M Gonda, M.D.        And   • [START ON 2/22/2022] folic acid (FOLVITE) tablet 1 mg  1 mg Oral DAILY Jeremy M Gonda, M.D.        And   • [START ON 2/22/2022] multivitamin (THERAGRAN)  tablet 1 Tablet  1 Tablet Oral DAILY Jeremy M Gonda, M.D.       • [START ON 2/22/2022] phytonadione (AQUA-MEPHYTON) 10 mg in NS 50 mL IVPB  10 mg Intravenous DAILY Jeremy M Gonda, M.D.       • senna-docusate (PERICOLACE or SENOKOT S) 8.6-50 MG per tablet 2 Tablet  2 Tablet Oral BID Jeremy M Gonda, M.D.        And   • polyethylene glycol/lytes (MIRALAX) PACKET 1 Packet  1 Packet Oral QDAY PRN Jeremy M Gonda, M.D.        And   • magnesium hydroxide (MILK OF MAGNESIA) suspension 30 mL  30 mL Oral QDAY PRN Jeremy M Gonda, M.D.        And   • bisacodyl (DULCOLAX) suppository 10 mg  10 mg Rectal QDAY PRN Jeremy M Gonda, M.D.       • ondansetron (ZOFRAN) syringe/vial injection 4 mg  4 mg Intravenous Q4HRS PRN Jeremy M Gonda, M.D.       • ondansetron (ZOFRAN ODT) dispertab 4 mg  4 mg Oral Q4HRS PRN Jeremy M Gonda, M.D.       • promethazine (PHENERGAN) tablet 12.5-25 mg  12.5-25 mg Oral Q4HRS PRN Jeremy M Gonda, M.D.       • promethazine (PHENERGAN) suppository 12.5-25 mg  12.5-25 mg Rectal Q4HRS PRN Jeremy M Gonda, M.D.       • prochlorperazine (COMPAZINE) injection 5-10 mg  5-10 mg Intravenous Q4HRS PRN Jeremy M Gonda, M.D.       • [START ON 2/22/2022] lactulose 20 GM/30ML solution 30 mL  30 mL Oral TID Jeremy M Gonda, M.D.       • riFAXIMin (XIFAXAN) tablet 550 mg  550 mg Oral BID Jeremy M Gonda, M.D.       • [START ON 2/22/2022] prednisoLONE (PRELONE) 15 MG/5ML syrup 39.9 mg  39.9 mg Oral DAILY Jeremy M Gonda, M.D.       • calcium GLUConate 2 g in NaCl IVPB premix  2 g Intravenous Once Jeremy M Gonda, M.D.       • NS (BOLUS) infusion 1,000 mL  1,000 mL Intravenous Once Jeremy M Gonda, M.D.         No current outpatient medications on file.       Allergies  No Known Allergies    Vital Signs last 24 hours  Temp:  [36.5 °C (97.7 °F)] 36.5 °C (97.7 °F)  Pulse:  [70-73] 71  Resp:  [16-42] 42  BP: ()/(40-59) 91/45  SpO2:  [91 %-96 %] 91 %    Physical Exam  Physical Exam  Vitals and nursing note reviewed.    Constitutional:       General: He is sleeping. He is in acute distress.      Appearance: He is morbidly obese. He is ill-appearing. He is not toxic-appearing.      Interventions: Nasal cannula in place.   HENT:      Head: Normocephalic and atraumatic.      Nose: Nose normal. No congestion.      Mouth/Throat:      Mouth: Mucous membranes are dry.      Pharynx: No oropharyngeal exudate.      Comments: Dried blood in his oropharynx and teeth  Eyes:      General: Scleral icterus present.      Pupils: Pupils are equal, round, and reactive to light.      Comments: Conjunctival pallor   Neck:      Comments: Enlarged neck circumference  Cardiovascular:      Rate and Rhythm: Normal rate and regular rhythm. Occasional extrasystoles are present.     Chest Wall: PMI is displaced.      Pulses: Normal pulses.      Heart sounds: Heart sounds are distant. No murmur heard.     Comments: Borderline hypotension  Pulmonary:      Effort: Pulmonary effort is normal. No accessory muscle usage or respiratory distress.      Breath sounds: Examination of the right-lower field reveals rhonchi. Examination of the left-lower field reveals rhonchi. Rhonchi present.      Comments: Speaking in full sentences, protecting airway  Abdominal:      General: Bowel sounds are normal. There is no distension.      Palpations: Abdomen is soft.      Tenderness: There is no abdominal tenderness. There is no guarding.   Musculoskeletal:         General: No tenderness.      Cervical back: Neck supple. No rigidity.      Right lower leg: Edema present.      Left lower leg: Edema present.   Skin:     General: Skin is dry.      Capillary Refill: Capillary refill takes 2 to 3 seconds.      Coloration: Skin is jaundiced and pale.   Neurological:      General: No focal deficit present.      Mental Status: He is easily aroused. He is disoriented.      Cranial Nerves: No cranial nerve deficit.      Sensory: No sensory deficit.      Motor: No weakness.      Comments:  Oriented to person and hospital only otherwise significantly confused   Psychiatric:         Behavior: Behavior is cooperative.         Fluids  No intake or output data in the 24 hours ending 02/21/22 1596    Laboratory  Recent Results (from the past 48 hour(s))   COD (ADULT)    Collection Time: 02/21/22  8:45 PM   Result Value Ref Range    ABO Grouping Only B     Rh Grouping Only POS     Antibody Screen-Cod NEG    ABO Rh Confirm    Collection Time: 02/21/22  8:45 PM   Result Value Ref Range    ABO Rh Confirm B POS    CBC With Differential    Collection Time: 02/21/22  8:48 PM   Result Value Ref Range    WBC 23.2 (H) 4.8 - 10.8 K/uL    RBC 3.23 (L) 4.70 - 6.10 M/uL    Hemoglobin 10.8 (L) 14.0 - 18.0 g/dL    Hematocrit 31.3 (L) 42.0 - 52.0 %    MCV 96.9 81.4 - 97.8 fL    MCH 33.4 (H) 27.0 - 33.0 pg    MCHC 34.5 33.7 - 35.3 g/dL    RDW 67.7 (H) 35.9 - 50.0 fL    Platelet Count 250 164 - 446 K/uL    MPV 10.9 9.0 - 12.9 fL    Neutrophils-Polys 84.50 (H) 44.00 - 72.00 %    Lymphocytes 5.30 (L) 22.00 - 41.00 %    Monocytes 7.80 0.00 - 13.40 %    Eosinophils 0.40 0.00 - 6.90 %    Basophils 0.70 0.00 - 1.80 %    Immature Granulocytes 1.30 (H) 0.00 - 0.90 %    Nucleated RBC 0.00 /100 WBC    Neutrophils (Absolute) 19.57 (H) 1.82 - 7.42 K/uL    Lymphs (Absolute) 1.24 1.00 - 4.80 K/uL    Monos (Absolute) 1.81 (H) 0.00 - 0.85 K/uL    Eos (Absolute) 0.10 0.00 - 0.51 K/uL    Baso (Absolute) 0.17 (H) 0.00 - 0.12 K/uL    Immature Granulocytes (abs) 0.29 (H) 0.00 - 0.11 K/uL    NRBC (Absolute) 0.00 K/uL    Anisocytosis 2+ (A)     Macrocytosis 2+ (A)    Comp Metabolic Panel    Collection Time: 02/21/22  8:48 PM   Result Value Ref Range    Sodium 135 135 - 145 mmol/L    Potassium 4.3 3.6 - 5.5 mmol/L    Chloride 100 96 - 112 mmol/L    Co2 16 (L) 20 - 33 mmol/L    Anion Gap 19.0 (H) 7.0 - 16.0    Glucose 87 65 - 99 mg/dL    Bun 91 (HH) 8 - 22 mg/dL    Creatinine 5.69 (HH) 0.50 - 1.40 mg/dL    Calcium 8.4 (L) 8.5 - 10.5 mg/dL     AST(SGOT) 135 (H) 12 - 45 U/L    ALT(SGPT) 79 (H) 2 - 50 U/L    Alkaline Phosphatase 229 (H) 30 - 99 U/L    Total Bilirubin 26.4 (H) 0.1 - 1.5 mg/dL    Albumin 2.1 (L) 3.2 - 4.9 g/dL    Total Protein 6.5 6.0 - 8.2 g/dL    Globulin 4.4 (H) 1.9 - 3.5 g/dL    A-G Ratio 0.5 g/dL   Prothrombin Time    Collection Time: 02/21/22  8:48 PM   Result Value Ref Range    PT 21.7 (H) 12.0 - 14.6 sec    INR 1.96 (H) 0.87 - 1.13   ESTIMATED GFR    Collection Time: 02/21/22  8:48 PM   Result Value Ref Range    GFR If  14 (A) >60 mL/min/1.73 m 2    GFR If Non African American 11 (A) >60 mL/min/1.73 m 2   HEPATITIS PANEL ACUTE(4 COMPONENTS)    Collection Time: 02/21/22  8:48 PM   Result Value Ref Range    Hepatitis B Surface Antigen Non-Reactive Non-Reactive    Hepatitis B Cors Ab,IgM Non-Reactive Non-Reactive    Hepatitis A Virus Ab, IgM Non-Reactive Non-Reactive    Hepatitis C Antibody Non-Reactive Non-Reactive   PERIPHERAL SMEAR REVIEW    Collection Time: 02/21/22  8:48 PM   Result Value Ref Range    Peripheral Smear Review see below    PLATELET ESTIMATE    Collection Time: 02/21/22  8:48 PM   Result Value Ref Range    Plt Estimation Normal    MORPHOLOGY    Collection Time: 02/21/22  8:48 PM   Result Value Ref Range    RBC Morphology Present     Poikilocytosis 2+     Schistocytes 1+     Target Cells 1+     Echinocytes 2+    DIFFERENTIAL COMMENT    Collection Time: 02/21/22  8:48 PM   Result Value Ref Range    Comments-Diff see below    LIPASE    Collection Time: 02/21/22  9:11 PM   Result Value Ref Range    Lipase 31 11 - 82 U/L   LACTIC ACID    Collection Time: 02/21/22  9:11 PM   Result Value Ref Range    Lactic Acid 2.1 (H) 0.5 - 2.0 mmol/L   DIAGNOSTIC ALCOHOL    Collection Time: 02/21/22  9:11 PM   Result Value Ref Range    Diagnostic Alcohol <10.1 0.0 - 10.0 mg/dL   MAGNESIUM    Collection Time: 02/21/22  9:11 PM   Result Value Ref Range    Magnesium 2.9 (H) 1.5 - 2.5 mg/dL   PHOSPHORUS    Collection Time:  02/21/22  9:11 PM   Result Value Ref Range    Phosphorus 6.9 (H) 2.5 - 4.5 mg/dL   IONIZED CALCIUM    Collection Time: 02/21/22  9:11 PM   Result Value Ref Range    Ionized Calcium 1.0 (L) 1.1 - 1.3 mmol/L   AMMONIA    Collection Time: 02/21/22  9:11 PM   Result Value Ref Range    Ammonia 151 (HH) 11 - 45 umol/L       Imaging  DX-CHEST-PORTABLE (1 VIEW)   Final Result         1.  Pulmonary edema and/or infiltrates.      US-ABDOMEN COMPLETE SURVEY    (Results Pending)    *Personally reviewed chest x-ray showed enlarged cardiac silhouette with perihilar infiltrates    Assessment/Plan  * GIB (gastrointestinal bleeding)- (present on admission)  Assessment & Plan  Upper gastrointestinal hemorrhage -DDx includes esophageal/gastric varices vs ulcer disease vs esophagitis/gastritis vs malignancy  Serial CBC with conservative transfusion strategy  Begin IV octreotide infusion with bolus  IV PPI twice daily  IV Rocephin x7 days  Keep n.p.o.  GI consultation for EGD  Large bore IV access, IV fluid resuscitation  Check TEG/platelet mapping    Hepatic encephalopathy (HCC)  Assessment & Plan  Begin lactulose and rifaximin  Avoid sedatives  Close neurologic monitoring    Hypotension due to hypovolemia  Assessment & Plan  Continue fluid resuscitation for now monitoring hemoglobin closely with potential need for transfusion  Begin midodrine    Acute renal failure with tubular necrosis (HCC)  Assessment & Plan  Hopefully prerenal with ATN component, DDx includes hepatorenal syndrome  Aggressive IV fluid resuscitation, albumin 100 g/day x 48 hours, midodrine, octreotide  Avoid nephrotoxins  Monitor creatinine, urine output, electrolytes closely  Consider nephrology consultation if remains unchanged or worsens despite fluid resuscitation    Alcohol abuse  Assessment & Plan  Significant alcohol abuse with most recent consumption reported 3 weeks ago  Monitor for alcohol withdrawal syndrome using RASS with as needed  benzodiazepines  Vitamin supplementation  Alcohol cessation education and resources to be provided    Decompensated hepatic cirrhosis (HCC)  Assessment & Plan  Alcoholic cirrhosis decompensated with associated hepatic encephalopathy, metabolic acidosis, acute kidney injury, GI bleed  Meld score 40, Madrey discriminatory function of 58.7  GI consultation in the morning  Begin prednisolone 40 mg oral daily  Monitor synthetic function  Avoid hepatotoxins  Palliative care consultation, poor prognosis unfortunately    Leukemoid reaction  Assessment & Plan  Likely reactive at this time  Monitor    Class 3 severe obesity due to excess calories without serious comorbidity in adult (HCC)  Assessment & Plan  Encourage behavioral and dietary modification for weight loss  RD consultation    Acute blood loss anemia  Assessment & Plan  Secondary to gastrointestinal hemorrhage  Serial CBC with conservative transfusion strategy targeting a hemoglobin goal greater than 7    Hypocalcemia  Assessment & Plan  Repletion with IV calcium gluconate  Monitor      Discussed patient condition and risk of morbidity and/or mortality with Hospitalist, Family, RN, Pharmacy, Charge nurse / hot rounds, Patient and Emergency physician.    The patient remains critically ill.  Critical care time = 45 minutes in directly providing and coordinating critical care and extensive data review.  No time overlap and excludes procedures.

## 2022-02-22 NOTE — HOSPITAL COURSE
2/21 - admitted overnight to ICU  2/22 - Intubated for HE and need for EGD, dialysis line placed, Nephro/GI consulted.  Vent Day 1  2/23 - Vent Day 2, Dialysis yesterday and planned for today.  Palliative.  2/24 - VD 3, oropharynx packed with gauze, sedated and paralyzed to tolerate packing.  Began searching for transplant center  2/25 - VD 4, rejected from transplant centers (see plan of care note on 2/24), discussed with ENT utility of NP scope to look for source of NP bleeding  2/26 - Nasopharynx packed by ENT yesterday, ~stable labs.  Some improvement in oxygenation.  Worsening vasopressors today  2/27 - Liberate RASS goal today  2/28 - VD7, FiO2 100% and PEEP to 18 with O2 sats at 87%, Levo at 25

## 2022-02-22 NOTE — ASSESSMENT & PLAN NOTE
Secondary to gastrointestinal hemorrhage  Now diffuse oropharyngeal bleeding  Packed oropharynx with TXA soaked gauze  Serial CBC with conservative transfusion strategy targeting a hemoglobin goal greater than 7

## 2022-02-22 NOTE — ASSESSMENT & PLAN NOTE
contributing to overall morbidity  Encourage behavioral and dietary modification for weight loss  RD consultation  Advancing tube feeds as tolerated

## 2022-02-22 NOTE — ASSESSMENT & PLAN NOTE
Alcoholic cirrhosis decompensated with associated hepatic encephalopathy, metabolic acidosis, acute kidney injury, GI bleed  On arrival: Meld score 40, Madrey 69  Monitor synthetic function  Avoid hepatotoxins  Palliative care assisting, poor prognosis unfortunately  Cont steroids: prednisolone 40mg x28 days (until 3/22/22 then begin taper which is scheduled to end on 4/11/22)  **taper placed for the next 3 weeks as follows: 30mg x 5 days, 20mg x 5 days, 10mg x 5 days, 5mg x 5 days  No significant change in overall status

## 2022-02-22 NOTE — ED TRIAGE NOTES
Chief Complaint   Patient presents with   • GI Bleeding     Pt BIB [facility] for hematemesis ~1liter bright red blood out per ems. Pt states he's been weaning himself off of etoh since January 15, noticed dark maroon color of emesis since he was weaning himself off.      Pt arrived with blood around mouth, ems reported blood was noted at his house ~1liter out. Pt is disoriented to place and situation on arrival, poor historian. Pt brother lives with pt and states that he has been tired and in bed for the past three days, as well as new onset confusion.  Received 1 liter NS en route.

## 2022-02-22 NOTE — ASSESSMENT & PLAN NOTE
Likely hepatorenal   Nephrology following  No significant improvement with daily HD  HD per nephrology

## 2022-02-22 NOTE — CARE PLAN
Problem: Pain - Standard  Goal: Alleviation of pain or a reduction in pain to the patient’s comfort goal  Outcome: Progressing     Problem: Knowledge Deficit - Standard  Goal: Patient and family/care givers will demonstrate understanding of plan of care, disease process/condition, diagnostic tests and medications  Outcome: Not Progressing     Problem: Skin Integrity  Goal: Skin integrity is maintained or improved  Outcome: Progressing     Problem: Fall Risk  Goal: Patient will remain free from falls  Outcome: Progressing     The patient is Watcher - Medium risk of patient condition declining or worsening    Shift Goals  Clinical Goals: Hemodynamic stability  Patient Goals: Sleep  Family Goals: Updates on any changes, patient comfort, complete abdominal imaging    Progress made toward(s) clinical / shift goals: Monitoring patient blood pressure, HR, and SpO2. Patient blood pressure improving after fluid resuscitation. Plan for abdominal ultrasound this AM.      Patient is not progressing towards the following goals:      Problem: Knowledge Deficit - Standard  Goal: Patient and family/care givers will demonstrate understanding of plan of care, disease process/condition, diagnostic tests and medications  Outcome: Not Progressing

## 2022-02-22 NOTE — PROCEDURES
Procedure: EGD (Esophagogstroduodenoscopy)    Date of procedure: 2/22/2022    Enodscopist: Dinesh Johnston M.D.    Anesthesia: Patient receiving propofol for ventilator management.  No additional sedation given for the procedure.    Pre-op diagnosis:  Upper GI bleeding    Post-op diagnosis:  Blanca-Villa tear  Lyman grade A erosive esophagitis    Complications: None    Estimated blood loss: None from the procedure    Specimens:  None    Indications:  37-year-old male with alcoholic liver disease presenting with history of hematemesis and melena.      Description of procedure:  After discussion of indications,risks and benefits including the risks of perforation and bleeding informed consent was signed by patient's mother, since patient is intubated and sedated.  The procedure was done at bedside.  No additional sedation was administered for the procedure.  Time out was performed.  Due to patient's morbid obesity, the procedure was performed in supine position.  Olympus Video gastroscope was placed in the oral cavity and the esophagus was intubated under direct visualization. The endoscope was then advanced through the lumen of the esophagus into the stomach and passed through to the third portion of duodenum. Upon careful withdrawal of the scope, mucosa  was visualized carefully. Gastric cardia and fundus visualized by retroflexion. Findings and maneuvers as listed below.After deflating the stomach the endoscope was removed and procedure was terminated. Patient tolerated procedure well.    Findings:  No varices were identified in the esophagus.  At the GE junction there is a 1 cm linear superficial ulceration which is likely a healing Blanca-Villa tear.  Small erosions also noted at the GE junction.  Mild portal hypertensive gastropathy noted in the body of the stomach.  Noted changes include mosaic pattern mucosa.  Retroflexed visualization of cardioesophageal junction revealed no evidence of gastric  varices.  Duodenum normal through to the third portion.      Plan:  Upper GI bleeding most likely secondary to Blanca-Villa tear and erosive esophagitis.  PPI twice daily  Monitor labs and transfuse as necessary.

## 2022-02-22 NOTE — ASSESSMENT & PLAN NOTE
Blanca Villa and erosive esophagitis  Serial CBC with conservative transfusion strategy  Cont IV PPI twice daily  TFs advanced  Hb at 7.3 today

## 2022-02-22 NOTE — ED PROVIDER NOTES
ED Provider Note    CHIEF COMPLAINT  Chief Complaint   Patient presents with   • GI Bleeding     Pt BIB [facility] for hematemesis ~1liter bright red blood out per ems. Pt states he's been weaning himself off of etoh since January 15, noticed dark maroon color of emesis since he was weaning himself off.         HPI    Primary care provider: Pcp Pt States None   History obtained from: Patient, EMS and subsequently family members  History limited by: None     Cody Gordon is a 37 y.o. male who presents to the ED by EMS for possible hematemesis.  He was noted to have blood around his mouth and patient states that it is due to bleeding from his teeth and not from vomiting blood.  Family members subsequently arrived and state that he was coughing up blood.  They report that patient is a heavy alcoholic and patient reports that he stopped drinking on January 15.  He denies any pain except for back pain since last night.  He denies fever/cough/shortness of breath or difficulty breathing.  He denies any nausea at this time.  He denies diarrhea or dysuria.  He denies drug use.  Family reports that he seems to be sleeping more and less responsive today.  They also report that he has received 2 doses of the COVID-19 vaccination.  They report patient without any other prior known medical problems except for high blood pressure but he does not usually seek medical care and he is not on any regular medications.    REVIEW OF SYSTEMS  Please see HPI for pertinent positives/negatives.  All other systems reviewed and are negative.     PAST MEDICAL HISTORY  No past medical history on file.     SURGICAL HISTORY  No past surgical history on file.     SOCIAL HISTORY  Social History     Tobacco Use   • Smoking status: Current Some Day Smoker   • Smokeless tobacco: Never Used   Vaping Use   • Vaping Use: Never used   Substance and Sexual Activity   • Alcohol use: Not Currently     Comment: weaning off since feb   • Drug use: Yes      Comment: marijuana   • Sexual activity: Not on file        FAMILY HISTORY  No family history on file.     CURRENT MEDICATIONS  Home Medications     Reviewed by Modesta Gil (Pharmacy Tech) on 02/21/22 at 2132  Med List Status: Complete   Medication Last Dose Status        Patient Arron Taking any Medications                        ALLERGIES  No Known Allergies     PHYSICAL EXAM  VITAL SIGNS: BP (!) 86/48   Pulse 70   Temp 36.6 °C (97.9 °F) (Temporal)   Resp (!) 27   Ht 1.829 m (6')   Wt (!) 193 kg (424 lb 9.7 oz)   SpO2 94%   BMI 57.59 kg/m²  @AUGUSTO[[id number]::@     Pulse ox interpretation: 92% on supplemental oxygen I interpret this pulse ox as normal     Constitutional: Well developed, well nourished, alert in no apparent distress, nontoxic appearance    HENT: No external signs of trauma, normocephalic, oropharynx moist with blood around the mouth without active bleeding, no trismus/drooling/stridor, airway is widely patent and patient able to speak without difficulty,, nose normal    Eyes: PERRL, conjunctiva without erythema, no discharge, bilateral icterus    Neck: Soft and supple, trachea midline, no stridor, no tenderness, no LAD, no JVD, good ROM    Cardiovascular: Regular rate and rhythm, no murmurs/rubs/gallops, strong distal pulses and good perfusion    Thorax & Lungs: No respiratory distress, CTAB   Abdomen: Soft, nontender, nondistended, no guarding, no rebound, normal BS    Back: No CVAT    Extremities: No cyanosis, minimal bilateral lower extremity edema, no gross deformity, good ROM, no tenderness, intact distal pulses with brisk cap refill    Skin: Warm, dry, no pallor/cyanosis, no rash noted except diffuse jaundice  Lymphatic: No lymphadenopathy noted    Neuro: A/O times 3, no focal deficits noted    Psychiatric: Cooperative, slightly slow response, no signs of active hallucinations or delusions, he denies suicidal or homicidal ideations      DIAGNOSTIC STUDIES / PROCEDURES        LABS  All  labs reviewed by me.     Results for orders placed or performed during the hospital encounter of 02/21/22   CBC With Differential   Result Value Ref Range    WBC 23.2 (H) 4.8 - 10.8 K/uL    RBC 3.23 (L) 4.70 - 6.10 M/uL    Hemoglobin 10.8 (L) 14.0 - 18.0 g/dL    Hematocrit 31.3 (L) 42.0 - 52.0 %    MCV 96.9 81.4 - 97.8 fL    MCH 33.4 (H) 27.0 - 33.0 pg    MCHC 34.5 33.7 - 35.3 g/dL    RDW 67.7 (H) 35.9 - 50.0 fL    Platelet Count 250 164 - 446 K/uL    MPV 10.9 9.0 - 12.9 fL    Neutrophils-Polys 84.50 (H) 44.00 - 72.00 %    Lymphocytes 5.30 (L) 22.00 - 41.00 %    Monocytes 7.80 0.00 - 13.40 %    Eosinophils 0.40 0.00 - 6.90 %    Basophils 0.70 0.00 - 1.80 %    Immature Granulocytes 1.30 (H) 0.00 - 0.90 %    Nucleated RBC 0.00 /100 WBC    Neutrophils (Absolute) 19.57 (H) 1.82 - 7.42 K/uL    Lymphs (Absolute) 1.24 1.00 - 4.80 K/uL    Monos (Absolute) 1.81 (H) 0.00 - 0.85 K/uL    Eos (Absolute) 0.10 0.00 - 0.51 K/uL    Baso (Absolute) 0.17 (H) 0.00 - 0.12 K/uL    Immature Granulocytes (abs) 0.29 (H) 0.00 - 0.11 K/uL    NRBC (Absolute) 0.00 K/uL    Anisocytosis 2+ (A)     Macrocytosis 2+ (A)    Comp Metabolic Panel   Result Value Ref Range    Sodium 135 135 - 145 mmol/L    Potassium 4.3 3.6 - 5.5 mmol/L    Chloride 100 96 - 112 mmol/L    Co2 16 (L) 20 - 33 mmol/L    Anion Gap 19.0 (H) 7.0 - 16.0    Glucose 87 65 - 99 mg/dL    Bun 91 (HH) 8 - 22 mg/dL    Creatinine 5.69 (HH) 0.50 - 1.40 mg/dL    Calcium 8.4 (L) 8.5 - 10.5 mg/dL    AST(SGOT) 135 (H) 12 - 45 U/L    ALT(SGPT) 79 (H) 2 - 50 U/L    Alkaline Phosphatase 229 (H) 30 - 99 U/L    Total Bilirubin 26.4 (H) 0.1 - 1.5 mg/dL    Albumin 2.1 (L) 3.2 - 4.9 g/dL    Total Protein 6.5 6.0 - 8.2 g/dL    Globulin 4.4 (H) 1.9 - 3.5 g/dL    A-G Ratio 0.5 g/dL   Prothrombin Time   Result Value Ref Range    PT 21.7 (H) 12.0 - 14.6 sec    INR 1.96 (H) 0.87 - 1.13   LIPASE   Result Value Ref Range    Lipase 31 11 - 82 U/L   PROCALCITONIN   Result Value Ref Range    Procalcitonin 3.27  (H) <0.25 ng/mL   LACTIC ACID   Result Value Ref Range    Lactic Acid 2.1 (H) 0.5 - 2.0 mmol/L   DIAGNOSTIC ALCOHOL   Result Value Ref Range    Diagnostic Alcohol <10.1 0.0 - 10.0 mg/dL   MAGNESIUM   Result Value Ref Range    Magnesium 2.9 (H) 1.5 - 2.5 mg/dL   PHOSPHORUS   Result Value Ref Range    Phosphorus 6.9 (H) 2.5 - 4.5 mg/dL   IONIZED CALCIUM   Result Value Ref Range    Ionized Calcium 1.0 (L) 1.1 - 1.3 mmol/L   AMMONIA   Result Value Ref Range    Ammonia 151 (HH) 11 - 45 umol/L   ESTIMATED GFR   Result Value Ref Range    GFR If  14 (A) >60 mL/min/1.73 m 2    GFR If Non African American 11 (A) >60 mL/min/1.73 m 2   HEPATITIS PANEL ACUTE(4 COMPONENTS)   Result Value Ref Range    Hepatitis B Surface Antigen Non-Reactive Non-Reactive    Hepatitis B Cors Ab,IgM Non-Reactive Non-Reactive    Hepatitis A Virus Ab, IgM Non-Reactive Non-Reactive    Hepatitis C Antibody Non-Reactive Non-Reactive   PERIPHERAL SMEAR REVIEW   Result Value Ref Range    Peripheral Smear Review see below    PLATELET ESTIMATE   Result Value Ref Range    Plt Estimation Normal    MORPHOLOGY   Result Value Ref Range    RBC Morphology Present     Poikilocytosis 2+     Schistocytes 1+     Target Cells 1+     Echinocytes 2+    DIFFERENTIAL COMMENT   Result Value Ref Range    Comments-Diff see below    COD (ADULT)   Result Value Ref Range    ABO Grouping Only B     Rh Grouping Only POS     Antibody Screen-Cod NEG    PLATELET MAPPING WITH BASIC TEG   Result Value Ref Range    Reaction Time Initial-R 13.2 (H) 4.6 - 9.1 min    React Time Initial Hep 12.7 (H) 4.3 - 8.3 min    Clot Kinetics-K 1.3 0.8 - 2.1 min    Clot Angle-Angle 74.1 63.0 - 78.0 degrees    Maximum Clot Strength-MA 70.3 (H) 52.0 - 69.0 mm    TEG Functional Fibrinogen(MA) 41.0 (H) 15.0 - 32.0 mm    Lysis 30 minutes-LY30 0.0 0.0 - 2.6 %    % Inhibition ADP see comment 0.0 - 17.0 %    % Inhibition AA see comment 0.0 - 11.0 %    TEG Algorithm Link Algorithm    ABO Rh  Confirm   Result Value Ref Range    ABO Rh Confirm B POS         RADIOLOGY  The radiologist's interpretation of all radiological studies have been reviewed by me.     DX-CHEST-PORTABLE (1 VIEW)   Final Result         1.  Pulmonary edema and/or infiltrates.      US-ABDOMEN COMPLETE SURVEY    (Results Pending)          COURSE & MEDICAL DECISION MAKING  Nursing notes, VS, PMSFHx reviewed in chart.     Review of past medical records shows the patient was last seen in this ED 9/16/2002.      Differential diagnoses considered include but are not limited to: Alcoholism, liver failure, esophageal varices, perforation/obstruction, ileus, cholecystitis/ascending cholangitis, gallstone/biliary colic, pancreatitis, PUD, gastritis, GERD, colitis, coagulopathy      MELD score=40      2145: D/W hospitalist who will see and admit the patient    2220: D/W intensivist who will see patient      History and physical exam as above.  Patient was noted to be diffusely jaundiced and family reports he has longstanding alcohol abuse but did stop drinking about a month ago.  Patient denies any pain except for back pain and also denies any nausea but Zofran was given as a precaution since EMS reported hematemesis.  He was slow to respond but was able to answer questions regarding the year, month, place and [place] president correctly.  He was also able to follow commands appropriately.  He was found to have multiple lab abnormalities including renal failure and liver dysfunction with high bilirubin level.  Hospitalist was initially consulted for admission but felt that patient required higher level of care in the ICU especially given his developing hypotension.  Patient was evaluated by the intensivist who graciously agreed to admit patient.  Patient and family members were updated and they are agreeable.  Appreciate assistance from hospitalist and intensivist in the care of this critically ill patient.      CRITICAL CARE NOTE:  Total critical care  time spent on this patient was 30 minutes exclusive of separately billable procedures.  This may include direct bedside patient care, speaking with family members, review of past medical records, reviewing the results of laboratory/diagnostic studies, consulting with other physicians, as well as evaluating the response to the therapy instituted.        FINAL IMPRESSION  1. Acute renal failure, unspecified acute renal failure type (HCC) Active   2. Hyperbilirubinemia Active   3. Elevated transaminase level Active   4. Increased anion gap metabolic acidosis Active   5. Anemia, unspecified type Active   6. Leukocytosis, unspecified type Active   7. Alcoholism (HCC) Chronic          DISPOSITION  Patient will be admitted to the ICU by intensivist in guarded condition      Electronically signed by: Johnnie Lombardi D.O., 2/21/2022 9:08 PM      Portions of this record were made with voice recognition software.  Despite my review, spelling/grammar/context errors may still remain.  Interpretation of this chart should be taken in this context.

## 2022-02-22 NOTE — PROCEDURES
Central Line Insertion    Date/Time: 2/22/2022 11:53 AM  Performed by: Cody Orellana M.D.  Authorized by: Cody Orellana M.D.     Consent:     Consent obtained:  Verbal    Consent given by:  Parent and patient    Risks discussed:  Incorrect placement, pneumothorax, arterial puncture and bleeding    Alternatives discussed:  No treatment and delayed treatment  Pre-procedure details:     Hand hygiene: Hand hygiene performed prior to insertion      Sterile barrier technique: All elements of maximal sterile technique followed      Skin preparation:  2% chlorhexidine    Skin preparation agent: Skin preparation agent completely dried prior to procedure    Procedure details:     Location:  R internal jugular    Patient position:  Flat    Procedural supplies:  Triple lumen    Catheter size: 13 Fr Trialysis Catheter.    Landmarks identified: yes      Ultrasound guidance: yes      Sterile ultrasound techniques: Sterile gel and sterile probe covers were used      Number of attempts:  1    Successful placement: yes    Post-procedure details:     Post-procedure:  Dressing applied and line sutured    Assessment:  Blood return through all ports, free fluid flow, no pneumothorax on x-ray and placement verified by x-ray    Patient tolerance of procedure:  Tolerated well, no immediate complications  Comments:        Wire was accounted for  Line is ok to use

## 2022-02-22 NOTE — ED NOTES
Med rec updated and complete. Allergies reviewed confirmed name and date of birth.    Pt takes no medications.      Home pharmacy [facility] [phone]

## 2022-02-22 NOTE — PROGRESS NOTES
2330: Pt arrived to ICU    Vitals:   · HR: 67  · BP: 79/44  · RR: 20  · SaO2: 96 on 3L NC  · Wt: 192.6kg  · Temp 97.9kg  _____________________________________________________________      Skin Assessment Completed by KELBY Lora and KELBY Roman.    · Pannus discoloration and ingrown hairs  · R thigh scratch  · R abdomen scratches   · Left groin moist and red  · Right groin discolored  · Non blanching discoloration to b/l buttocks, upper thighs and sacrum  · Left knee abrasion        Devices In Places ECG, Blood Pressure Cuff, Pulse Ox, SCD's and Nasal Cannula      Interventions In Place NC W/Ear Foams, InterDry, Sacral Mepilex, TAP System, Q2 Turns and Low Air Loss Mattress    Possible Skin Injury Yes    Pictures Uploaded Into Epic Yes  Wound Consult Placed Yes  RN Wound Prevention Protocol Ordered Yes    ______________________________________________________________    Personal belongings:   · Sleeveless shirt  · Gym shorts  · Sweatshirt

## 2022-02-22 NOTE — ASSESSMENT & PLAN NOTE
Significant alcohol abuse with most recent consumption reported 3 weeks ago  Vitamin supplementation  Alcohol cessation education and resources to be provided if survives.

## 2022-02-23 NOTE — CONSULTS
Reason for PC Consult: Advance Care Planning    Consulted by:   Dr. Orellana    Assessment:  General:   37 year old male admitted for GI bleed on 2/21/22. Pt has a history of EtOH abuse, last documented alcoholic drink is 1/15/22, obesity (BMI 57.59), and HTN. Pt presented to [facility] ED via EMS with blood emesis, altered mentation and pain. Pt found to have dried blood in mouth and appeared to be bleeding at the gums.     Prior PC Consult:   None on File    Social:   Pt was independent with all ADLs, he is not  nor has adult children. NOK are pt's parents. Pt has two minor children, one of whom lives with him.     Dyspnea: Yes, 94% on 80% FiO2 via ETT  Last BM:  (PTA)    Pain: Unable to determine   Depression: Unable to determine   Dementia: Unable to Determine       Spiritual:  Is Mosque or spirituality important for coping with this illness? Unable to determine    Has a  or spiritual provider visit been requested? Unable to determine    Palliative Performance Scale: 10%    Advance Directive: None on File    DPOA: None on File  POLST: None on File    To locate the AD/POLST, please hover the cursor over the patient's code status to find all linked ACP documents.    Code Status: Full    Outcome:  PC RN visited pt at bedside, pt currently intubated and sedated, unable to engage in GOC discussion.    Discussed with Dr. Orellana, appreciate updates.     Called mother, Evie. The family is currently driving to [facility] and would like to meet in person.    1215  Met with mother Evie, grandmother Calista, and minor child Tyler in conference room. Introduced self and role of PC. Long discussion about pt's clinical picture, they verbalized understanding, PC RN provided general education regarding clinical picture, MELD score, Maddrey score, and pt's bleeding risk.    Expressed concern for pt's clinical picture, family verbalized understanding. PC RN encouraged family to remain hopeful, however they do need to  discuss amongst themselves about code status and at what point pt would want the care team to focus on comfort. Family verbalized understanding.    Evie reports that pt is not  and does not have adult children. Pt had been out of work for the past few months. Prior to admission pt was independent with all ADLs.     Encouraged family to discuss code status, family verbalized understanding.    Provided business card to Evie and Calista, encouraged them to call with any questions or concerns.     Active listening, education, validation, normalization, therapeutic touch, and emotional support provided throughout encounter.    Updated:   Bedside RN    Plan:   Continue to discuss GOC as clinical picture unfolds. Will plan a care conference with physician in a few days.     Recommendations: I do not recommend an ethics or hospice consult at this time because GOC discussion is ongoing.    *Recommendation does not provide clinical appropriateness for hospice nor does it provide that the patient would or would not qualify for hospice services.*     Thank you for allowing Palliative Care to participate in this patient's care. Please feel free to call [phone] with any questions or concerns.

## 2022-02-23 NOTE — PROGRESS NOTES
Updated Dr. Wright that pt bleeding substantial amount from mouth, appears to be from around gums. Pool of blood behind head/shoulders found.   CBC sent. Orders received to send TEG.

## 2022-02-23 NOTE — CARE PLAN
Problem: Ventilation  Goal: Ability to achieve and maintain unassisted ventilation or tolerate decreased levels of ventilator support  Description: Target End Date:  4 days     Document on Vent flowsheet    1.  Support and monitor invasive and noninvasive mechanical ventilation  2.  Monitor ventilator weaning response  3.  Perform ventilator associated pneumonia prevention interventions  4.  Manage ventilation therapy by monitoring diagnostic test results  Outcome: Not Met      Ventilator Daily Summary    Vent Day #  2  Ventilator settings changed this shift:  no  Weaning trials:  no  Respiratory Procedures:  no  Plan: Continue current ventilator settings and wean mechanical ventilation as tolerated per physician orders.   [phone]

## 2022-02-23 NOTE — PROGRESS NOTES
[facility] Progress Note    Hemodialysis treatment #2 x 3 hours completed as ordered per Dr. KISHAN Garrett.  Treatment started at 0949 and ended at 1249.      Net UF Removed: 3000 mL (see Dialysis I & O flow sheet)    Patient tolerated treatment well with vasopressor x1 titrated from 16 mcg-20 mcg by PCN for BP support/fluid removal. Patient VS stable post treatment.  See Acute HD flow sheets on clinical data notes under media for details.      Post tx access: Right IJ non tunneled triple lumen HD catheter flushed with saline then locked with heparin 1000 units/ml per designated amount in each lumen (see MAR) then clamped, capped aseptically and labeled properly. CVC dressings clean, dry, intact.. No s/s of infection to HD catheter site. Heparin lock to be aspirated prior to next dialysis/CVC use by dialysis RN only. Please do not flush or draw from ports.    Please notify Nephrologist/Dialysis for follow-up.     Report given to primary care nurse MARLENE Morrison RN.

## 2022-02-23 NOTE — PROGRESS NOTES
"Critical Care Progress Note    Date of admission  2/21/2022    Chief Complaint  \"37 y.o. male who presented 2/21/2022 with a past medical history significant for alcohol abuse for the last 4+ years worsening over the last several months after he lost his job as well as obesity and hypertension who has not seen a physician or take any medications for many years.  He is brought in by EMS at the request of his family for worsening confusion, drowsiness, and 3 days of hematemesis with associated melena.  His 15-year-old son who is at bedside and lives with his father, reports that patient has been vomiting approximately 2-3 times per day a moderate amount of blood and is having 1-2 melanotic stools per day worsening over the last 24 hours.  Patient has been refusing to seek medical attention.  The patient reports stopping alcohol approximately 3 weeks ago and normally drinks a pint of vodka per day.  He does not take any nonsteroidal anti-inflammatory drugs nor use any anticoagulation medication.  He denies any prior history of gastrointestinal hemorrhage nor has he been evaluated by a gastroenterologist for liver disease in the past.  He states that he has had decreased urine output over the last 24 hours.  EMS found him with significant blood around him (1 L of bright red blood) and borderline hypotension.  He was evaluated by the emergency physician as well as hospitalist and found to be in multiorgan failure in the setting of GI bleed and requested a consultation for ICU admission and critical care management.  GI was not consulted in the ED as the emergency physicians have been requested to \"not consult to gastroenterology in the middle the night unless an emergent procedure needs to be done.\" - Dr Gonda 2/21/22    Hospital Course  2/21 - admitted overnight to ICU  2/22 - Intubated for HE and need for EGD, dialysis line placed, Nephro/GI consulted.  Vent Day 1  2/23 - Vent Day 2, Dialysis yesterday and planned for " today.  Palliative.      Interval Problem Update  Reviewed last 24 hour events:  Intubated yesterday  Dialysis yesterday  WBC mild improvement  Hgb drifting down  T Bili increasing  AST/ALT stable  Ordered vitamin C - bleeding gums  Dialysis later today      Review of Systems  Review of Systems   Unable to perform ROS: Intubated        Vital Signs for last 24 hours   Temp:  [36.4 °C (97.5 °F)] 36.4 °C (97.5 °F)  Pulse:  [59-73] 69  Resp:  [] 23  BP: ()/(37-58) 96/48  SpO2:  [92 %-97 %] 94 %    Hemodynamic parameters for last 24 hours       Respiratory Information for the last 24 hours  Vent Mode: APVCMV  Rate (breaths/min): 22  Vt Target (mL): 430  PEEP/CPAP: 12  MAP: 15  Control VTE (exp VT): 482    Physical Exam   Physical Exam  Vitals and nursing note reviewed.   Constitutional:       Appearance: He is morbidly obese. He is ill-appearing and toxic-appearing.      Interventions: He is sedated and intubated.   HENT:      Head: Normocephalic.      Nose: Nose normal.      Mouth/Throat:      Comments: Dried blood around teeth and mouth  Eyes:      General: Scleral icterus present.   Cardiovascular:      Rate and Rhythm: Normal rate and regular rhythm.      Pulses: Normal pulses.   Pulmonary:      Effort: No respiratory distress. He is intubated.      Comments: Mechanical breath sounds  Abdominal:      General: Abdomen is protuberant. There is no distension.      Tenderness: There is no abdominal tenderness.   Musculoskeletal:         General: Swelling present. Normal range of motion.      Cervical back: Normal range of motion. No rigidity.   Skin:     General: Skin is warm and dry.      Coloration: Skin is jaundiced.   Neurological:      Mental Status: He is confused.      GCS: GCS eye subscore is 2. GCS verbal subscore is 4. GCS motor subscore is 5.      Comments: RASS -4         Medications  Current Facility-Administered Medications   Medication Dose Route Frequency Provider Last Rate Last Admin   • NS  infusion   Intravenous Continuous Jeremy M Gonda, M.D. 30 mL/hr at 02/23/22 0138 New Bag at 02/23/22 0138   • lactulose 20 GM/30ML solution 30 mL  30 mL Enteral Tube Q4HRS Cody Orellana M.D.       • nystatin (MYCOSTATIN) powder   Topical TID Jeremy M Gonda, M.D.   Given at 02/23/22 1122   • norepinephrine (Levophed) 8 mg in 250 mL NS infusion (premix)  0-30 mcg/min Intravenous Continuous Cody Orellana M.D. 30 mL/hr at 02/23/22 1020 16 mcg/min at 02/23/22 1020   • fentaNYL (SUBLIMAZE) 50 mcg/mL in 50mL   Intravenous Continuous Cody Orellana M.D. 1 mL/hr at 02/22/22 1931 50 mcg/hr at 02/22/22 1931   • propofol (DIPRIVAN) injection  0-80 mcg/kg/min Intravenous Continuous Cody Orellana M.D. 29 mL/hr at 02/23/22 1019 25 mcg/kg/min at 02/23/22 1019   • lidocaine (XYLOCAINE) 1 % injection 2 mL  2 mL Tracheal Tube Q30 MIN PRN Cody Orellana M.D.       • sodium bicarbonate 150 mEq in D5W infusion (premix)   Intravenous Continuous Cody Orellana M.D. 50 mL/hr at 02/22/22 1546 New Bag at 02/22/22 1546   • Pharmacy Consult: Enteral tube insertion - review meds/change route/product selection  1 Each Other PHARMACY TO DOSE Cody Orellana M.D.       • thiamine (Vitamin B-1) tablet 100 mg  100 mg Enteral Tube Q EVENING Cody Orellana M.D.   100 mg at 02/22/22 1725    And   • folic acid (FOLVITE) tablet 1 mg  1 mg Enteral Tube Q EVENING Cody Orellana M.D.   1 mg at 02/22/22 1725    And   • multivitamin (THERAGRAN) tablet 1 Tablet  1 Tablet Enteral Tube Q EVENING Cody Orellana M.D.   1 Tablet at 02/22/22 1725   • midodrine (PROAMATINE) tablet 10 mg  10 mg Enteral Tube TID WITH MEALS Cody Orellana M.D.   10 mg at 02/23/22 1122   • prednisoLONE (PRELONE) 15 MG/5ML syrup 39.9 mg  39.9 mg Enteral Tube DAILY Cody Orellana M.D.   39.9 mg at 02/23/22 0537   • riFAXIMin (XIFAXAN) tablet 550 mg  550 mg Enteral Tube BID Cody Orellana M.D.   550 mg at 02/23/22 0537   • heparin injection 2,400 Units  2,400 Units Intracatheter PRN Caterina  DIANA Garrett   2,400 Units at 02/22/22 2256   • pantoprazole (Protonix) injection 40 mg  40 mg Intravenous BID Jeremy M Gonda, M.D.   40 mg at 02/23/22 0537   • Pharmacy Consult Request   Other PHARMACY TO DOSE Jeremy M Gonda, M.D.       • phytonadione (AQUA-MEPHYTON) 10 mg in NS 50 mL IVPB  10 mg Intravenous DAILY Jeremy M Gonda, M.D.   Stopped at 02/23/22 0637   • ondansetron (ZOFRAN) syringe/vial injection 4 mg  4 mg Intravenous Q4HRS PRN Jeremy M Gonda, M.D.       • albumin human 25% solution 25 g  25 g Intravenous Q6HRS Jeremy M Gonda, M.D. 150 mL/hr at 02/23/22 1122 25 g at 02/23/22 1122       Fluids    Intake/Output Summary (Last 24 hours) at 2/23/2022 1153  Last data filed at 2/23/2022 0600  Gross per 24 hour   Intake 4360.16 ml   Output 2527 ml   Net 1833.16 ml       Laboratory  Recent Labs     02/22/22  1210 02/23/22  0446   ISTATAPH 7.334* 7.308*   ISTATAPCO2 34.0 44.4*   ISTATAPO2 66 61*   ISTATATCO2 19* 24   [id number] 92* 89*   ISTATARTHCO3 18.1 22.3   ISTATARTBE -7* -4   ISTATTEMP see below 97.3 F   ISTATFIO2 80 80   ISTATSPEC Arterial Arterial   ISTATAPHTC  --  7.318*   [id number]  --  58*         Recent Labs     02/21/22  2111 02/22/22  0435 02/22/22  1245 02/22/22  2325 02/23/22  0525 02/23/22  0852   SODIUM  --  134*   < > 135 135 136   POTASSIUM  --  4.6   < > 4.3 4.5 4.4   CHLORIDE  --  101   < > 100 100 100   CO2  --  18*   < > 19* 19* 18*   BUN  --  99*   < > 75* 84* 92*   CREATININE  --  6.28*   < > 5.32* 5.70* 6.36*   MAGNESIUM 2.9* 3.1*  --   --  2.9*  --    PHOSPHORUS 6.9* 8.5*  --   --  8.2*  --    CALCIUM  --  8.4*   < > 8.6 8.7 8.7    < > = values in this interval not displayed.     Recent Labs     02/21/22 2048 02/21/22 2111 02/22/22  0435 02/22/22  1245 02/22/22  2325 02/23/22  0525 02/23/22  0852   ALTSGPT 79*  --  73*  --   --  71*  --    ASTSGOT 135*  --  116*  --   --  114*  --    ALKPHOSPHAT 229*  --  220*  --   --  175*  --    TBILIRUBIN 26.4*  --  27.0*  --   --  31.6*  --     LIPASE  --  31  --   --   --   --   --    GLUCOSE 87  --  96   < > 134* 148* 153*    < > = values in this interval not displayed.     Recent Labs     02/21/22 2048 02/22/22 0435 02/22/22  1730 02/23/22  0525   WBC 23.2* 22.0* 25.7* 24.8*   NEUTSPOLYS 84.50* 82.20* 87.30* 83.20*   LYMPHOCYTES 5.30* 7.30* 6.10* 6.30*   MONOCYTES 7.80 8.20 5.10 8.90   EOSINOPHILS 0.40 0.40 0.00 0.00   BASOPHILS 0.70 0.50 0.40 0.30   ASTSGOT 135* 116*  --  114*   ALTSGPT 79* 73*  --  71*   ALKPHOSPHAT 229* 220*  --  175*   TBILIRUBIN 26.4* 27.0*  --  31.6*     Recent Labs     02/21/22 2048 02/22/22 0435 02/22/22  1245 02/22/22  1730 02/22/22  2325 02/23/22  0525 02/23/22  1128   RBC 3.23* 2.91*  --  2.74*  --  2.52*  --    HEMOGLOBIN 10.8* 9.8*   < > 9.3* 9.2* 8.6* 9.1*   HEMATOCRIT 31.3* 28.1*  --  27.3*  --  24.4*  --    PLATELETCT 250 221  --  246  --  235  --    PROTHROMBTM 21.7* 22.2*  --   --   --  18.7*  --    INR 1.96* 2.01*  --   --   --  1.61*  --     < > = values in this interval not displayed.       Imaging  X-Ray:  I have personally reviewed the images and compared with prior images. and My impression is: RUL has improved, though global hypoinflation remains    Assessment/Plan  * GIB (gastrointestinal bleeding)- (present on admission)  Assessment & Plan  Blanca Villa and erosive esophagitis  Serial CBC with conservative transfusion strategy    No varices, octreotide did not help HRS, will DC  IV PPI twice daily  No ascites, ok to DC rocephin  Keep n.p.o.    Large bore IV access, has trialysis line now as well    Hepatic encephalopathy (HCC)  Assessment & Plan  Begin lactulose and rifaximin  Avoid sedatives  Close neurologic monitoring    Now intubated for worsening mental status and facilitation of EGD  Over the course of the morning he became gradually more somnolent and may require prolonged intubation until HE is resolved    1BM yesterday, titrating lactulose for 3-4 BM daily    Acute renal failure with tubular  necrosis (HCC)  Assessment & Plan  Urine output very low  Likely HRS unfortunately  Dialysis catheter placed    Now on daily dialysis - nephrology to re-evaluate needs daily  For now still volume overloaded, high FiO2 requirements    Alcohol abuse  Assessment & Plan  Significant alcohol abuse with most recent consumption reported 3 weeks ago  Monitor for alcohol withdrawal syndrome using RASS with as needed benzodiazepines  Vitamin supplementation  Alcohol cessation education and resources to be provided    Decompensated hepatic cirrhosis (HCC)  Assessment & Plan  Alcoholic cirrhosis decompensated with associated hepatic encephalopathy, metabolic acidosis, acute kidney injury, GI bleed  Meld score 40, Madrey discriminatory function of 69 now  Monitor synthetic function  Avoid hepatotoxins  Palliative care consultation, poor prognosis unfortunately    After GI and Nephrology have seen him, will discuss with transplant centers, however he is unlikely to be a candidate  Before this, will see if he improves with steroids over 7 days. If he worsens prior to 7 days will initiate transfer.    On mechanically assisted ventilation (HCC)  Assessment & Plan  Intubated date: 2/22/22  Goal saturation > 88%  Monitor ventilator waveforms and titrate flow/peep and volumes according.   Lung protective ventilation strategy  CXR PRN: monitor lung volumes and tube/line placement  VAP bundle prevention, oral care, post pyloric feeding  Head of bed > 30 degree  GI prophylaxis: PPI BID  Daily awakening and SBT trials unless contraindicated  Assess / Treat pain  Assess / Treat delirium  Sedation Goal: RASS 0 to -1  Monitor for liberation / early mobility  Respiratory treatments: prn  ABCDEF Bundle     Hypotension due to hypovolemia  Assessment & Plan  Now on norepinephrine  Midodrine    Albumin 1g/kg x2 days for potential HRS    Trend Hgb and transfuse >7    Class 3 severe obesity due to excess calories without serious comorbidity in adult  ([facility])  Assessment & Plan  Encourage behavioral and dietary modification for weight loss  RD consultation    Acute blood loss anemia  Assessment & Plan  Secondary to gastrointestinal hemorrhage  Serial CBC with conservative transfusion strategy targeting a hemoglobin goal greater than 7    Hypocalcemia  Assessment & Plan  Repletion with IV calcium gluconate  Monitor    Leukemoid reaction  Assessment & Plan  Likely reactive at this time  On ceftriaxone  Monitor - relatively stable       VTE:  Contraindicated  Ulcer: PPI BID for GI Bleed  Lines: Central Line  Ongoing indication addressed and Fernández Catheter  Ongoing indication addressed    I have performed a physical exam and reviewed and updated ROS and Plan today (2/23/2022). In review of yesterday's note (2/22/2022), there are no changes except as documented above.     Discussed patient condition and risk of morbidity and/or mortality with Family, RN, RT, Pharmacy, Code status disscussed, Charge nurse / hot rounds, Patient and GI and nephrology     The patient remains critically ill.  He is intubated and on mechanical ventilation.  Critical care time = 63 minutes in directly providing and coordinating critical care and extensive data review.  No time overlap and excludes procedures.

## 2022-02-23 NOTE — PROGRESS NOTES
Updated Dr. Wright - pt still coughing up a substantial amount of bright red blood out of mouth. Difficult to ascertain if it is from mouth or further down throat.   CBC back, TEG still pending

## 2022-02-23 NOTE — CONSULTS
`                                           Gastroenterology Consult Note     Date of Consult:  2/22/2022     Reason for consult:  Hematemesis, Melena      HPI:     Mr. Gordon is a 38 y/o male with past medical history of Hypertension,  Obesity class 3 and ETOH use Disorder.  Patient was brought to the hospital via EMS after his family called 911 due to worsening confusion and also several episodes of melena and hematemesis over the past few days.  History was taken by asking family members, apparently they wanted Mr. Gordon to seek medical attention for quite some time but he declined.  Per family he has been drinking very heavily for the past 4 years, initially he was drinking only beer but recent months he started drinking Vodka up to a pint daily.  Family noticed changes in his mentation in the past week, but very drastic over the past 48 hrs, they also noticed 2-3 episodes of melena and hematemesis in the past few days, but today he had a very large amount of hematemesis.  When EMS arrived to Patient's home he was noted very confused and also hypotensive.  In the ED noted multiorgan failure for which ERP consulted Intensive care physician to admit patient to the ICU.       PMHX:History reviewed. No pertinent past medical history.       PSurgHx: No past surgical history on file.     ALLERGIES:Patient has no known allergies.    No current facility-administered medications on file prior to encounter.     No current outpatient medications on file prior to encounter.          Current Facility-Administered Medications:   •  nystatin (MYCOSTATIN) powder, , Topical, TID, Jeremy M Gonda, M.D., Given at 02/22/22 1138  •  norepinephrine (Levophed) 8 mg in 250 mL NS infusion (premix), 0-30 mcg/min, Intravenous, Continuous, Cody Orellana M.D., Last Rate: 15 mL/hr at 02/22/22 1530, 8 mcg/min at 02/22/22 1530  •  fentaNYL (SUBLIMAZE) 50 mcg/mL in 50mL, , Intravenous, Continuous, Cody Orellana M.D., Last Rate: 1 mL/hr at  02/22/22 1044, 50 mcg/hr at 02/22/22 1044  •  propofol (DIPRIVAN) injection, 0-80 mcg/kg/min, Intravenous, Continuous, Last Rate: 34.7 mL/hr at 02/22/22 1519, 30 mcg/kg/min at 02/22/22 1519 **AND** Triglycerides Starting now and then Every 3 Days, , , Every 3 Days (0300), Cody Orellana M.D.  •  lidocaine (XYLOCAINE) 1 % injection 2 mL, 2 mL, Tracheal Tube, Q30 MIN PRN, Cody Orellana M.D.  •  sodium bicarbonate 150 mEq in D5W infusion (premix), , Intravenous, Continuous, Cody Orellana M.D., Last Rate: 50 mL/hr at 02/22/22 1546, New Bag at 02/22/22 1546  •  Pharmacy Consult: Enteral tube insertion - review meds/change route/product selection, 1 Each, Other, PHARMACY TO DOSE, Cody Orellana M.D.  •  [COMPLETED] detox IV 1000 mL (D5LR + magnesium 1 g + thiamine 100 mg + folic acid 1 mg) infusion, , Intravenous, Once, Stopped at 02/22/22 0328 **AND** thiamine (Vitamin B-1) tablet 100 mg, 100 mg, Enteral Tube, Q EVENING, 100 mg at 02/22/22 1725 **AND** folic acid (FOLVITE) tablet 1 mg, 1 mg, Enteral Tube, Q EVENING, 1 mg at 02/22/22 1725 **AND** multivitamin (THERAGRAN) tablet 1 Tablet, 1 Tablet, Enteral Tube, Q EVENING, Cody Orellaan M.D., 1 Tablet at 02/22/22 1725  •  lactulose 20 GM/30ML solution 30 mL, 30 mL, Enteral Tube, TID, Cody Orellana M.D., 30 mL at 02/22/22 1726  •  midodrine (PROAMATINE) tablet 10 mg, 10 mg, Enteral Tube, TID WITH MEALS, Cody Orellana M.D., 10 mg at 02/22/22 1725  •  [START ON 2/23/2022] prednisoLONE (PRELONE) 15 MG/5ML syrup 39.9 mg, 39.9 mg, Enteral Tube, DAILY, Cody Orellana M.D.  •  riFAXIMin (XIFAXAN) tablet 550 mg, 550 mg, Enteral Tube, BID, Cody Orellana M.D., 550 mg at 02/22/22 1725  •  octreotide (SANDOSTATIN) 1,250 mcg in  mL Infusion, 50 mcg/hr, Intravenous, Continuous, Jeremy M Gonda, M.D., Last Rate: 10 mL/hr at 02/21/22 2323, 50 mcg/hr at 02/21/22 2323  •  pantoprazole (Protonix) injection 40 mg, 40 mg, Intravenous, BID, Jeremy M Gonda, M.D., 40 mg at 02/22/22  1726  •  cefTRIAXone (Rocephin) syringe 2 g, 2 g, Intravenous, Q EVENING, Jeremy M Gonda, M.D., 2 g at 02/22/22 1745  •  Pharmacy Consult Request, , Other, PHARMACY TO DOSE, Jeremy M Gonda, M.D.  •  phytonadione (AQUA-MEPHYTON) 10 mg in NS 50 mL IVPB, 10 mg, Intravenous, DAILY, Jeremy M Gonda, M.D., Stopped at 02/22/22 0713  •  ondansetron (ZOFRAN) syringe/vial injection 4 mg, 4 mg, Intravenous, Q4HRS PRN, Jeremy M Gonda, M.D.  •  albumin human 25% solution 25 g, 25 g, Intravenous, Q6HRS, Jeremy M Gonda, M.D., Last Rate: 150 mL/hr at 02/22/22 1726, 25 g at 02/22/22 1726      SocHx:   Social History     Socioeconomic History   • Marital status: Single     Spouse name: Not on file   • Number of children: Not on file   • Years of education: Not on file   • Highest education level: Not on file   Occupational History   • Not on file   Tobacco Use   • Smoking status: Current Some Day Smoker   • Smokeless tobacco: Never Used   Vaping Use   • Vaping Use: Never used   Substance and Sexual Activity   • Alcohol use: Not Currently     Comment: weaning off since feb   • Drug use: Yes     Comment: marijuana   • Sexual activity: Not on file   Other Topics Concern   • Not on file   Social History Narrative   • Not on file     Social Determinants of Health     Financial Resource Strain: Not on file   Food Insecurity: Not on file   Transportation Needs: Not on file   Physical Activity: Not on file   Stress: Not on file   Social Connections: Not on file   Intimate Partner Violence: Not on file   Housing Stability: Not on file        FAMHx: No family history on file.     ROS:  Unable to perform due to patient's mental status     PE:  Vitals:    02/22/22 1530 02/22/22 1545 02/22/22 1600 02/22/22 1615   BP: (!) 87/42 (!) 98/49 (!) 96/46 (!) 94/45   Pulse: (!) 59 61 61 60   Resp: (!) 22 (!) 22 (!) 22 (!) 22   Temp:       TempSrc:       SpO2: 93% 94% 93% 93%   Weight:       Height:           General: Morbid Obesity, Intubated.  HEENT:  Atraumatic, normocephalic, generalized jaundice.  Neck: No goiter, no cervical or supraclavicular adenopathy.  CVS: Regular rate and rhythm, no murmurs.  Pulmonary: Breath sounds diminished likely due to body habitus.  Abdomen: Obese, soft, nondistended, nontender, no hepatosplenomegaly, no masses palpable, no ascites  Extremities: No edema of lower extremities.  NEURO: Sedated, intubated.  Skin: Generalized Jaundice, no rashes appreciated.  Psychiatry: Unable to assess patient is sedated.     LABS:  Lab Results   Component Value Date/Time    SODIUM 135 02/22/2022 12:45 PM    POTASSIUM 5.3 02/22/2022 12:45 PM    CHLORIDE 101 02/22/2022 12:45 PM    CO2 16 (L) 02/22/2022 12:45 PM    GLUCOSE 135 (H) 02/22/2022 12:45 PM    BUN 95 (HH) 02/22/2022 12:45 PM    CREATININE 6.52 (HH) 02/22/2022 12:45 PM      Lab Results   Component Value Date/Time    WBC 22.0 (H) 02/22/2022 04:35 AM    RBC 2.91 (L) 02/22/2022 04:35 AM    HEMOGLOBIN 9.1 (L) 02/22/2022 12:45 PM    HEMATOCRIT 28.1 (L) 02/22/2022 04:35 AM    MCV 96.6 02/22/2022 04:35 AM    MCH 33.7 (H) 02/22/2022 04:35 AM    MCHC 34.9 02/22/2022 04:35 AM    MPV 10.6 02/22/2022 04:35 AM    NEUTSPOLYS 82.20 (H) 02/22/2022 04:35 AM    LYMPHOCYTES 7.30 (L) 02/22/2022 04:35 AM    MONOCYTES 8.20 02/22/2022 04:35 AM    EOSINOPHILS 0.40 02/22/2022 04:35 AM    BASOPHILS 0.50 02/22/2022 04:35 AM    ANISOCYTOSIS 2+ (A) 02/21/2022 08:48 PM        Lab Results   Component Value Date/Time    PROTHROMBTM 22.2 (H) 02/22/2022 04:35 AM    INR 2.01 (H) 02/22/2022 04:35 AM      Recent Labs     02/21/22 2048 02/21/22 2111 02/22/22  0435   ASTSGOT 135*  --  116*   ALTSGPT 79*  --  73*   TBILIRUBIN 26.4*  --  27.0*   GLOBULIN 4.4*  --  4.0*   INR 1.96*  --  2.01*   AMMONIA  --  151*  --        IMAGING: Reviewed personally and summarized in HPI  DX-ABDOMEN FOR TUBE PLACEMENT   Final Result      1.  Feeding tube tip overlies the proximal stomach.      DX-CHEST-LIMITED (1 VIEW)   Final Result      1.   Interval placement of endotracheal tube and RIGHT internal jugular catheter as described above.   2.  Persistent hypoinflation with worsening RIGHT upper lobe atelectasis.   3.  No pneumothorax.      US-ABDOMEN COMPLETE SURVEY   Final Result         1.  Hepatomegaly and echogenic liver, compatible with fatty change versus fibrosis.   2.  Thickened gallbladder wall, however gallbladder is contracted and finding is likely related to contracted gallbladder state.   3.  Technically limited evaluation due to patient body habitus and shadowing bowel gas.         DX-CHEST-PORTABLE (1 VIEW)   Final Result         1.  Pulmonary edema and/or infiltrates.              ASSESSMENT:     Alcohol Use Disorder  Acute on chronic Liver Cirrhosis   Hepatic Encephalopathy  Blanca-Villa tear    - Patient presented to the ED via EMS long history of ETOH use 4+ years, initially he was drinking only beer but lately he has been drinking Vodka (a pint a day), family called EMS since they noticed worsening confusion in recent days.  - Hx was taken by asking family members in the ED, patient apparently has had melena and hematemesis for the past few days, family wanted him to seek medical attention but he has been refusing.  - Per EMS when they arrived to his home they saw a pool of blood approximately 1L, he was also hypotensive.  - IN the ED noticed multiorgan failure.  - MELD score 40, Madrey discriminatory function 69  - EGD on 2/22/22 showed a 1 cm linear superficial ulceration at the GE junction likely a healing Blanca-Villa tear. Mild portal hypertensive gastropathy in the body of the stomach. No evidence of varices.  - Patient has a poor prognosis consider consult to palliative care.     PLAN:   - Continue PPI BID  - Daily labs  - Trial Prednisolone 7 days  - Lille score in 7 days to assess response to steroids   - Continue albumin, octreotide and midodrine   - Lactulose, Rifaximin  - Palliative consult  - GI will continue to follow      Thank you for the consult.    Dr. Anthony MD, PGY2    Please see Julianne Johnston's attestation for further orders and recommendations.

## 2022-02-23 NOTE — PROGRESS NOTES
HD treatment ordered by Dr SCARLETT Garrett started at 2056 and ended at 2256 with net UF of 2 Liters as tolerated. Newly placed right IJ CVC verified by x ray was patent with good BFR x 2 entire treatment.stable v/s while on HD. See flow sheet for details.

## 2022-02-23 NOTE — CARE PLAN
Problem: Pain - Standard  Goal: Alleviation of pain or a reduction in pain to the patient’s comfort goal  Outcome: Progressing     Problem: Skin Integrity  Goal: Skin integrity is maintained or improved  Outcome: Progressing     Problem: Fall Risk  Goal: Patient will remain free from falls  Outcome: Progressing     Problem: Safety - Medical Restraint  Goal: Remains free of injury from restraints (Restraint for Interference with Medical Device)  Outcome: Progressing     The patient is Watcher - Medium risk of patient condition declining or worsening    Shift Goals  Clinical Goals: Hemodynamic stability, decreased bleeding from mouth  Patient Goals: Unable to assess  Family Goals: Patient comfort, stable vitals, any updates on changes    Progress made toward(s) clinical / shift goals:  Patient's BP monitored Q15 minutes through night, vasopressors titrated as needed per MAR to maintain MAP goals. Oxygenation stable through night. Patient had increased bleeding from mouth and abnormal TEG results, 2 units plasma administered per MAD order and DDAVP x1 per MAR.

## 2022-02-23 NOTE — CARE PLAN
Problem: Ventilation  Goal: Ability to achieve and maintain unassisted ventilation or tolerate decreased levels of ventilator support  Description: Target End Date:  4 days      Document on Vent flowsheet     1.  Support and monitor invasive and noninvasive mechanical ventilation  2.  Monitor ventilator weaning response  3.  Perform ventilator associated pneumonia prevention interventions  4.  Manage ventilation therapy by monitoring diagnostic test results  Outcome: Progressing                                   Ventilator Daily Summary     Vent Day #2       8.0 @ 26     22,430,12,0%     Ventilator settings changed this shift: None     Weaning trials: None     Respiratory Procedures: None.      Plan: Continue current ventilator settings and wean mechanical ventilation as tolerated per physician orders.

## 2022-02-23 NOTE — DOCUMENTATION QUERY
"                                                                         [facility]                                                                       Query Response Note      PATIENT:               CODY BEVERLY  ACCT #:                  [id number]  MRN:                     [id number]  :                      1985  ADMIT DATE:       2022 8:39 PM  DISCH DATE:          RESPONDING  PROVIDER #:        [id number]           QUERY TEXT:    Hypoxia is documented in the Medical Record. Please specify a diagnosis to correlate the Intubation, BAL and Vent    NOTE:  If an appropriate response is not listed below, please respond with a new note.    If you have any questions please contact:  Faviola Phelps RN CDI [facility]  [email or web address]  Faviola Phelps [address] [employer]      The patient's Clinical Indicators include:  37 year old male admitted for GIB.     Esteban \"Hypoxia\" & \"Increasing oxygen requirements, collapse of RUL\"   Esteban - Intubated for HE and need for EGD, dialysis line placed, Nephro/GI consulted.  Vent Day 1\"  \"Pulmonary:    Effort: Pulmonary effort is normal. No respiratory distress.\"    Risk factors: hepatic encephalopathy, GIB  Treatment: Mechanical vent  Options provided:   -- Acute respiratory failure with hypoxia   -- Acute respiratory failure with hypercapnia   -- Hypoxia   -- Findings of no clinical significance   -- Unable to determine      Query created by: Faviola Phelps on 2022 9:06 AM    RESPONSE TEXT:    Acute respiratory failure with hypoxia          Electronically signed by:  Cody Orellana MD 2022 10:40 AM              "

## 2022-02-23 NOTE — CARE PLAN
The patient is Unstable - High likelihood or risk of patient condition declining or worsening    Shift Goals  Clinical Goals: hemodynamic stability, GI consult, neph consult, make urine, maintain arousal and orientation  Patient Goals: siva  Family Goals: siva    Progress made toward(s) clinical / shift goals:  required initiation of vasopressor for hemodynamic support, required intubation and bronch due to resp failure/increasing somnolence/need for procedures. Neph consult completed, dialysis cath place, dialysis treatment pending. GI consult. Updated family.     Patient is not progressing towards the following goals:      Problem: Safety - Medical Restraint  Goal: Free from restraint(s) (Restraint for Interference with Medical Device)  Outcome: Not Met   Placed for risk to remove life saving device

## 2022-02-23 NOTE — PROGRESS NOTES
[facility] -  PROGRESS NOTE               Author: Alondra Kent M.D. Date & Time: 2/23/2022  1:48 PM     HPI:  37 year old male with medical H/o Alcohol abuse for the last four years, Obesity and Hypertension is admitted on 02/21 with Acute Upper GI bleed and decompensated cirrhosis. He has been having hematemesis and melena for the last one day and he has not seeked medical attention.  As per notes, patient drinks one pint of vodka daily. No H/o of NSAID's or other anticoagulation. He was admitted to ICU for further management. This morning, patient was intubated as he was desaturating and encephalopathy and also with the need for endoscopy.     He was started on PPI, octreotide, Midodrine and vasopresors for blood support. Labs showed Hb of 9.8, BUN/creatinine of 99/6.28. Ultrasound of abdomen showed no hydronephrosis. Ammonia is elevated at 151. Chets x ray showed pulmonary infiltrates.       DAILY NEPHROLOGY SUMMARY:  02/22: Dialysis catheter placed and underwent 2 hours of dialysis.  Endoscopy was done which showed Blanca-Villa tear and erosive esophagitis  02/23: On pressors, blood pressure still soft and patient looks grossly swollen.  Receiving the second day of dialysis today.       ROS  Unable to obtain as the patient is intubated    Physical Exam  Constitutional:       Appearance: He is obese.      Comments: Intubated   HENT:      Head:      Comments: Gross blood noted around the nostrils     Mouth/Throat:      Mouth: Mucous membranes are dry.   Eyes:      Extraocular Movements: Extraocular movements intact.      Pupils: Pupils are equal, round, and reactive to light.   Cardiovascular:      Rate and Rhythm: Normal rate and regular rhythm.      Pulses: Normal pulses.      Heart sounds: Normal heart sounds.   Pulmonary:      Effort: Pulmonary effort is normal.      Breath sounds: Normal breath sounds.   Abdominal:      General: Bowel sounds are normal.      Palpations:  Abdomen is soft.   Musculoskeletal:         General: Swelling present. Normal range of motion.      Cervical back: Normal range of motion.   Neurological:      Comments: Unable to assess completely         PAST FAMILY HISTORY: Reviewed and Unchanged  SOCIAL HISTORY: Reviewed and Unchanged  CURRENT MEDICATIONS: Reviewed  IMAGING STUDIES: Reviewed    Fluids:  In: 5395 [P.O.:30; I.V.:4298; Blood:387; Dialysis:500; Enteral:60]  Out: 2627     LABS:  Recent Results (from the past 24 hour(s))   URINALYSIS    Collection Time: 02/22/22  2:14 PM    Specimen: Urine, Fernández Cath   Result Value Ref Range    Color DK Yellow     Character Cloudy (A)     Specific Gravity 1.014 <1.035    Ph 5.5 5.0 - 8.0    Glucose Negative Negative mg/dL    Ketones Negative Negative mg/dL    Protein 30 (A) Negative mg/dL    Bilirubin Large (A) Negative    Urobilinogen, Urine 0.2 Negative    Nitrite Positive (A) Negative    Leukocyte Esterase Small (A) Negative    Occult Blood Negative Negative    Micro Urine Req Microscopic    URINE MICROSCOPIC (W/UA)    Collection Time: 02/22/22  2:14 PM   Result Value Ref Range    WBC 0-2 (A) /hpf    RBC 0-2 (A) /hpf    Bacteria Negative None /hpf    Epithelial Cells Negative /hpf    Amorphous Crystal Present /hpf    Hyaline Cast 3-5 (A) /lpf    Granular Casts 0-2 /lpf   CBC WITH DIFFERENTIAL    Collection Time: 02/22/22  5:30 PM   Result Value Ref Range    WBC 25.7 (H) 4.8 - 10.8 K/uL    RBC 2.74 (L) 4.70 - 6.10 M/uL    Hemoglobin 9.3 (L) 14.0 - 18.0 g/dL    Hematocrit 27.3 (L) 42.0 - 52.0 %    MCV 99.6 (H) 81.4 - 97.8 fL    MCH 33.9 (H) 27.0 - 33.0 pg    MCHC 34.1 33.7 - 35.3 g/dL    RDW 70.6 (H) 35.9 - 50.0 fL    Platelet Count 246 164 - 446 K/uL    MPV 11.1 9.0 - 12.9 fL    Neutrophils-Polys 87.30 (H) 44.00 - 72.00 %    Lymphocytes 6.10 (L) 22.00 - 41.00 %    Monocytes 5.10 0.00 - 13.40 %    Eosinophils 0.00 0.00 - 6.90 %    Basophils 0.40 0.00 - 1.80 %    Immature Granulocytes 1.10 (H) 0.00 - 0.90 %     Nucleated RBC 0.00 /100 WBC    Neutrophils (Absolute) 22.44 (H) 1.82 - 7.42 K/uL    Lymphs (Absolute) 1.57 1.00 - 4.80 K/uL    Monos (Absolute) 1.32 (H) 0.00 - 0.85 K/uL    Eos (Absolute) 0.01 0.00 - 0.51 K/uL    Baso (Absolute) 0.10 0.00 - 0.12 K/uL    Immature Granulocytes (abs) 0.29 (H) 0.00 - 0.11 K/uL    NRBC (Absolute) 0.00 K/uL    Anisocytosis 2+ (A)     Macrocytosis 2+ (A)    PERIPHERAL SMEAR REVIEW    Collection Time: 02/22/22  5:30 PM   Result Value Ref Range    Peripheral Smear Review see below    PLATELET ESTIMATE    Collection Time: 02/22/22  5:30 PM   Result Value Ref Range    Plt Estimation Normal    MORPHOLOGY    Collection Time: 02/22/22  5:30 PM   Result Value Ref Range    RBC Morphology Present     Poikilocytosis 2+     Schistocytes 1+     Target Cells 2+    DIFFERENTIAL COMMENT    Collection Time: 02/22/22  5:30 PM   Result Value Ref Range    Comments-Diff see below    PLATELET MAPPING WITH BASIC TEG    Collection Time: 02/22/22  5:40 PM   Result Value Ref Range    Reaction Time Initial-R >17.0 (H) 4.6 - 9.1 min    React Time Initial Hep 14.5 (H) 4.3 - 8.3 min    Clot Kinetics-K 1.2 0.8 - 2.1 min    Clot Angle-Angle 73.3 63.0 - 78.0 degrees    Maximum Clot Strength-MA 71.0 (H) 52.0 - 69.0 mm    TEG Functional Fibrinogen(MA) 47.2 (H) 15.0 - 32.0 mm    % Inhibition ADP 69.2 (H) 0.0 - 17.0 %    % Inhibition AA 18.1 (H) 0.0 - 11.0 %    TEG Algorithm Link Algorithm    Basic Metabolic Panel    Collection Time: 02/22/22 11:25 PM   Result Value Ref Range    Sodium 135 135 - 145 mmol/L    Potassium 4.3 3.6 - 5.5 mmol/L    Chloride 100 96 - 112 mmol/L    Co2 19 (L) 20 - 33 mmol/L    Glucose 134 (H) 65 - 99 mg/dL    Bun 75 (HH) 8 - 22 mg/dL    Creatinine 5.32 (HH) 0.50 - 1.40 mg/dL    Calcium 8.6 8.5 - 10.5 mg/dL    Anion Gap 16.0 7.0 - 16.0   HGB (Hemoglobin) for 48 hours    Collection Time: 02/22/22 11:25 PM   Result Value Ref Range    Hemoglobin 9.2 (L) 14.0 - 18.0 g/dL   ESTIMATED GFR    Collection  Time: 02/22/22 11:25 PM   Result Value Ref Range    GFR If  15 (A) >60 mL/min/1.73 m 2    GFR If Non  12 (A) >60 mL/min/1.73 m 2   POCT arterial blood gas device results    Collection Time: 02/23/22  4:46 AM   Result Value Ref Range    Ph 7.308 (L) 7.400 - 7.500    Pco2 44.4 (H) 26.0 - 37.0 mmHg    Po2 61 (L) 64 - 87 mmHg    Tco2 24 20 - 33 mmol/L    S02 89 (L) 93 - 99 %    Hco3 22.3 17.0 - 25.0 mmol/L    BE -4 -4 - 3 mmol/L    Body Temp 97.3 F degrees    O2 Therapy 80 %    iPF Ratio 76     Ph Temp Yamini 7.318 (L) 7.400 - 7.500    Pco2 Temp Co 43.0 (H) 26.0 - 37.0 mmHg    Po2 Temp Cor 58 (L) 64 - 87 mmHg    Specimen Arterial     DelSys Vent     Tidal Volume 430 mL    Peep End Expiratory Pressure 12 cmh20    Set Rate 22     Mode APV-CMV    CBC with Differential    Collection Time: 02/23/22  5:25 AM   Result Value Ref Range    WBC 24.8 (H) 4.8 - 10.8 K/uL    RBC 2.52 (L) 4.70 - 6.10 M/uL    Hemoglobin 8.6 (L) 14.0 - 18.0 g/dL    Hematocrit 24.4 (L) 42.0 - 52.0 %    MCV 96.8 81.4 - 97.8 fL    MCH 34.1 (H) 27.0 - 33.0 pg    MCHC 35.2 33.7 - 35.3 g/dL    RDW 69.0 (H) 35.9 - 50.0 fL    Platelet Count 235 164 - 446 K/uL    MPV 11.3 9.0 - 12.9 fL    Neutrophils-Polys 83.20 (H) 44.00 - 72.00 %    Lymphocytes 6.30 (L) 22.00 - 41.00 %    Monocytes 8.90 0.00 - 13.40 %    Eosinophils 0.00 0.00 - 6.90 %    Basophils 0.30 0.00 - 1.80 %    Immature Granulocytes 1.30 (H) 0.00 - 0.90 %    Nucleated RBC 0.00 /100 WBC    Neutrophils (Absolute) 20.61 (H) 1.82 - 7.42 K/uL    Lymphs (Absolute) 1.56 1.00 - 4.80 K/uL    Monos (Absolute) 2.20 (H) 0.00 - 0.85 K/uL    Eos (Absolute) 0.01 0.00 - 0.51 K/uL    Baso (Absolute) 0.07 0.00 - 0.12 K/uL    Immature Granulocytes (abs) 0.32 (H) 0.00 - 0.11 K/uL    NRBC (Absolute) 0.00 K/uL   Comp Metabolic Panel (CMP)    Collection Time: 02/23/22  5:25 AM   Result Value Ref Range    Sodium 135 135 - 145 mmol/L    Potassium 4.5 3.6 - 5.5 mmol/L    Chloride 100 96 - 112  mmol/L    Co2 19 (L) 20 - 33 mmol/L    Anion Gap 16.0 7.0 - 16.0    Glucose 148 (H) 65 - 99 mg/dL    Bun 84 (HH) 8 - 22 mg/dL    Creatinine 5.70 (HH) 0.50 - 1.40 mg/dL    Calcium 8.7 8.5 - 10.5 mg/dL    AST(SGOT) 114 (H) 12 - 45 U/L    ALT(SGPT) 71 (H) 2 - 50 U/L    Alkaline Phosphatase 175 (H) 30 - 99 U/L    Total Bilirubin 31.6 (H) 0.1 - 1.5 mg/dL    Albumin 3.2 3.2 - 4.9 g/dL    Total Protein 6.6 6.0 - 8.2 g/dL    Globulin 3.4 1.9 - 3.5 g/dL    A-G Ratio 0.9 g/dL   Magnesium    Collection Time: 02/23/22  5:25 AM   Result Value Ref Range    Magnesium 2.9 (H) 1.5 - 2.5 mg/dL   PHOSPHORUS    Collection Time: 02/23/22  5:25 AM   Result Value Ref Range    Phosphorus 8.2 (H) 2.5 - 4.5 mg/dL   Prothrombin Time    Collection Time: 02/23/22  5:25 AM   Result Value Ref Range    PT 18.7 (H) 12.0 - 14.6 sec    INR 1.61 (H) 0.87 - 1.13   ESTIMATED GFR    Collection Time: 02/23/22  5:25 AM   Result Value Ref Range    GFR If  14 (A) >60 mL/min/1.73 m 2    GFR If Non African American 11 (A) >60 mL/min/1.73 m 2   Basic Metabolic Panel    Collection Time: 02/23/22  8:52 AM   Result Value Ref Range    Sodium 136 135 - 145 mmol/L    Potassium 4.4 3.6 - 5.5 mmol/L    Chloride 100 96 - 112 mmol/L    Co2 18 (L) 20 - 33 mmol/L    Glucose 153 (H) 65 - 99 mg/dL    Bun 92 (HH) 8 - 22 mg/dL    Creatinine 6.36 (HH) 0.50 - 1.40 mg/dL    Calcium 8.7 8.5 - 10.5 mg/dL    Anion Gap 18.0 (H) 7.0 - 16.0   HEP B SURFACE AB    Collection Time: 02/23/22  8:52 AM   Result Value Ref Range    Hep B Surface Antibody Quant 746.00 (H) 0.00 - 10.00 mIU/mL   PLATELET MAPPING WITH BASIC TEG    Collection Time: 02/23/22  8:52 AM   Result Value Ref Range    Reaction Time Initial-R 12.5 (H) 4.6 - 9.1 min    React Time Initial Hep 12.6 (H) 4.3 - 8.3 min    Clot Kinetics-K 1.0 0.8 - 2.1 min    Clot Angle-Angle 76.6 63.0 - 78.0 degrees    Maximum Clot Strength-MA 68.8 52.0 - 69.0 mm    TEG Functional Fibrinogen(MA) 37.3 (H) 15.0 - 32.0 mm    Lysis 30  minutes-LY30 0.2 0.0 - 2.6 %    % Inhibition ADP 47.6 (H) 0.0 - 17.0 %    % Inhibition AA 8.1 0.0 - 11.0 %    TEG Algorithm Link Algorithm    ESTIMATED GFR    Collection Time: 02/23/22  8:52 AM   Result Value Ref Range    GFR If  12 (A) >60 mL/min/1.73 m 2    GFR If Non African American 10 (A) >60 mL/min/1.73 m 2   HGB (Hemoglobin) for 48 hours    Collection Time: 02/23/22 11:28 AM   Result Value Ref Range    Hemoglobin 9.1 (L) 14.0 - 18.0 g/dL       (click the triangle to expand results)      IMPRESSION:  # CHI: Unclear Baseline Cr, increased to 5.69--> 6.52 . Likely 2/2 pre renal and ATN with GI bleeding and Hypotension. Cannot rule out hepatorenal syndrome, however would treat other possible etiologies first.   # HTN  # Acute Upper GI bleeding   # Decompensated hepatic cirrhosis  #Acute anemia   #Hepatic encephalopathy    PLAN:  -day 2 of hemodialysis today.  Patient still looks grossly swollen.  We will assess the patient daily and determine the need for dialysis.   -Continue albumin, midodrine   -Stop sodium bicarbonate drip and IV fluids.   -Strict I/Os  -Dose all meds per eGFR < 10  -Rest of the management as per primary team

## 2022-02-24 PROBLEM — J39.2 OROPHARYNGEAL BLEEDING: Status: ACTIVE | Noted: 2022-01-01

## 2022-02-24 PROBLEM — K72.01 ACUTE LIVER FAILURE WITH HEPATIC COMA (HCC): Status: ACTIVE | Noted: 2022-01-01

## 2022-02-24 PROBLEM — Z71.89 GOALS OF CARE, COUNSELING/DISCUSSION: Status: ACTIVE | Noted: 2022-01-01

## 2022-02-24 NOTE — PROGRESS NOTES
Gastroenterology Progress Note     Author: Luis Carlos Cruz M.D.   Date & Time Created: 2/23/2022 6:03 PM    Chief Complaint:  Hematemesis   Melena    Interval History:  - NO significant changes in patient's status, remains critically ill.  - No evidence or recurrence of bleeding.  - H&H is stable.  - Patient remains sedated/intubated.    Review of Systems:  Review of Systems   Unable to perform ROS: Critical illness         Current Facility-Administered Medications:   •  lactulose 20 GM/30ML solution 30 mL, 30 mL, Enteral Tube, Q4HRS, Cody Orellana M.D., 30 mL at 02/23/22 1742  •  nystatin (MYCOSTATIN) powder, , Topical, TID, Jeremy M Gonda, M.D., Given at 02/23/22 1751  •  norepinephrine (Levophed) 8 mg in 250 mL NS infusion (premix), 0-30 mcg/min, Intravenous, Continuous, Cody Orellana M.D., Last Rate: 37.5 mL/hr at 02/23/22 1208, 20 mcg/min at 02/23/22 1208  •  fentaNYL (SUBLIMAZE) 50 mcg/mL in 50mL, , Intravenous, Continuous, Cody Orellana M.D., Last Rate: 1 mL/hr at 02/23/22 1742, 50 mcg/hr at 02/23/22 1742  •  propofol (DIPRIVAN) injection, 0-80 mcg/kg/min, Intravenous, Continuous, Last Rate: 29 mL/hr at 02/23/22 1331, 25 mcg/kg/min at 02/23/22 1331 **AND** Triglycerides Starting now and then Every 3 Days, , , Every 3 Days (0300), Cody Orellana M.D.  •  lidocaine (XYLOCAINE) 1 % injection 2 mL, 2 mL, Tracheal Tube, Q30 MIN PRN, Cody Orellana M.D.  •  Pharmacy Consult: Enteral tube insertion - review meds/change route/product selection, 1 Each, Other, PHARMACY TO DOSE, Cody Orellana M.D.  •  [COMPLETED] detox IV 1000 mL (D5LR + magnesium 1 g + thiamine 100 mg + folic acid 1 mg) infusion, , Intravenous, Once, Stopped at 02/22/22 0328 **AND** thiamine (Vitamin B-1) tablet 100 mg, 100 mg, Enteral Tube, Q EVENING, 100 mg at 02/23/22 1743 **AND** folic acid (FOLVITE) tablet 1 mg, 1 mg, Enteral Tube, Q EVENING, 1 mg at 02/23/22 1742 **AND** multivitamin (THERAGRAN) tablet 1 Tablet, 1 Tablet, Enteral Tube, Q  EVENING, Cody Orellana M.D., 1 Tablet at 02/23/22 1742  •  midodrine (PROAMATINE) tablet 10 mg, 10 mg, Enteral Tube, TID WITH MEALS, Cody Orellana M.D., 10 mg at 02/23/22 1742  •  prednisoLONE (PRELONE) 15 MG/5ML syrup 39.9 mg, 39.9 mg, Enteral Tube, DAILY, Cody Orellana M.D., 39.9 mg at 02/23/22 0537  •  riFAXIMin (XIFAXAN) tablet 550 mg, 550 mg, Enteral Tube, BID, Cody Orellana M.D., 550 mg at 02/23/22 1743  •  heparin injection 2,400 Units, 2,400 Units, Intracatheter, PRN, Caterina Garrett M.D., 2,400 Units at 02/23/22 1252  •  pantoprazole (Protonix) injection 40 mg, 40 mg, Intravenous, BID, Jeremy M Gonda, M.D., 40 mg at 02/23/22 1742  •  Pharmacy Consult Request - Transition to PO Antibiotics, , Other, PHARMACY TO DOSE, Jeremy M Gonda, M.D.  •  phytonadione (AQUA-MEPHYTON) 10 mg in NS 50 mL IVPB, 10 mg, Intravenous, DAILY, Jeremy M Gonda, M.D., Stopped at 02/23/22 0637  •  ondansetron (ZOFRAN) syringe/vial injection 4 mg, 4 mg, Intravenous, Q4HRS PRN, Jeremy M Gonda, M.D.  •  albumin human 25% solution 25 g, 25 g, Intravenous, Q6HRS, Jeremy M Gonda, M.D., Last Rate: 150 mL/hr at 02/23/22 1122, 25 g at 02/23/22 1122     Physical Exam:  Vitals:    02/23/22 1347   BP:    Pulse: 64   Resp: (!) 30   Temp:    SpO2: 98%        Physical Exam  Vitals reviewed.   Constitutional:       Appearance: He is obese.      Comments: Very limited exam due to critical illness.   HENT:      Head: Normocephalic and atraumatic.   Skin:     General: Skin is warm.      Capillary Refill: Capillary refill takes 2 to 3 seconds.      Coloration: Skin is jaundiced.         Labs:  Recent Labs     02/22/22  1210 02/23/22  0446   ISTATAPH 7.334* 7.308*   ISTATAPCO2 34.0 44.4*   ISTATAPO2 66 61*   ISTATATCO2 19* 24   BXGPPAC0OHP 92* 89*   ISTATARTHCO3 18.1 22.3   ISTATARTBE -7* -4   ISTATTEMP see below 97.3 F   ISTATFIO2 80 80   ISTATSPEC Arterial Arterial   ISTATAPHTC  --  7.318*   MEUJCGXT5YJ  --  58*         Recent Labs     02/21/22  2111  02/22/22 0435 02/22/22 1245 02/22/22 2325 02/23/22  0525 02/23/22  0852   SODIUM  --  134*   < > 135 135 136   POTASSIUM  --  4.6   < > 4.3 4.5 4.4   CHLORIDE  --  101   < > 100 100 100   CO2  --  18*   < > 19* 19* 18*   BUN  --  99*   < > 75* 84* 92*   CREATININE  --  6.28*   < > 5.32* 5.70* 6.36*   MAGNESIUM 2.9* 3.1*  --   --  2.9*  --    PHOSPHORUS 6.9* 8.5*  --   --  8.2*  --    CALCIUM  --  8.4*   < > 8.6 8.7 8.7    < > = values in this interval not displayed.     Recent Labs     02/21/22 2048 02/21/22 2111 02/22/22 0435 02/22/22 1245 02/22/22 2325 02/23/22  0525 02/23/22  0852   ALTSGPT 79*  --  73*  --   --  71*  --    ASTSGOT 135*  --  116*  --   --  114*  --    ALKPHOSPHAT 229*  --  220*  --   --  175*  --    TBILIRUBIN 26.4*  --  27.0*  --   --  31.6*  --    LIPASE  --  31  --   --   --   --   --    GLUCOSE 87  --  96   < > 134* 148* 153*    < > = values in this interval not displayed.     Recent Labs     02/21/22 2048 02/22/22 0435 02/22/22 1245 02/22/22 1730 02/22/22 2325 02/23/22  0525 02/23/22  1128   RBC 3.23* 2.91*  --  2.74*  --  2.52*  --    HEMOGLOBIN 10.8* 9.8*   < > 9.3* 9.2* 8.6* 9.1*   HEMATOCRIT 31.3* 28.1*  --  27.3*  --  24.4*  --    PLATELETCT 250 221  --  246  --  235  --    PROTHROMBTM 21.7* 22.2*  --   --   --  18.7*  --    INR 1.96* 2.01*  --   --   --  1.61*  --     < > = values in this interval not displayed.     Recent Labs     02/21/22 2048 02/22/22  0435 02/22/22  1730 02/23/22  0525   WBC 23.2* 22.0* 25.7* 24.8*   NEUTSPOLYS 84.50* 82.20* 87.30* 83.20*   LYMPHOCYTES 5.30* 7.30* 6.10* 6.30*   MONOCYTES 7.80 8.20 5.10 8.90   EOSINOPHILS 0.40 0.40 0.00 0.00   BASOPHILS 0.70 0.50 0.40 0.30   ASTSGOT 135* 116*  --  114*   ALTSGPT 79* 73*  --  71*   ALKPHOSPHAT 229* 220*  --  175*   TBILIRUBIN 26.4* 27.0*  --  31.6*       Imaging:  DX-CHEST-PORTABLE (1 VIEW)   Final Result      1.  Supportive tubing as described above.   2.  No pneumothorax.   3.  Persistent  hypoinflation with improved aeration of RIGHT upper lung.      DX-ABDOMEN FOR TUBE PLACEMENT   Final Result      1.  Feeding tube tip overlies the proximal stomach.      DX-CHEST-LIMITED (1 VIEW)   Final Result      1.  Interval placement of endotracheal tube and RIGHT internal jugular catheter as described above.   2.  Persistent hypoinflation with worsening RIGHT upper lobe atelectasis.   3.  No pneumothorax.      US-ABDOMEN COMPLETE SURVEY   Final Result         1.  Hepatomegaly and echogenic liver, compatible with fatty change versus fibrosis.   2.  Thickened gallbladder wall, however gallbladder is contracted and finding is likely related to contracted gallbladder state.   3.  Technically limited evaluation due to patient body habitus and shadowing bowel gas.         DX-CHEST-PORTABLE (1 VIEW)   Final Result         1.  Pulmonary edema and/or infiltrates.           Assessment:    ETOH use disorder  Acute on chronic liver failure  Hepatic encephalopathy  Liver cirrhosis  Blanca-hancock tear     - Stable H&H  - Patient critically ill  - Palliative care following  - He has a very poor prognosis  - GI will sign off    Plan:  - Continue PPI BID  - Lille score 7 days post steroid initiation  - Continue lactulose, rifaximin  - Daily labs  - GI will sign off      Dr. Anthony MD    Please see Dr Johnston's attestation for further recommendations.

## 2022-02-24 NOTE — PLAN OF CARE (IOPOC)
Unfortunately we have been rejected for transplant evaluation by several centers.  Rejections for recent alcohol use and capacity.  There are still centers we are waiting to hear back from, but I am concerned he is not going to be a candidate.    ADDENDUM:  I had the opportunity to speak with transplant at [facility].  They do have an accelerated pathway for transplant evaluation in patients who have alcoholic liver disease.  However it requires the patient to be awake, alert and interactive with transplant psychologists for the proper evaluation.  Given the fact that he had worsening obtundation due to liver failure and is now intubated, sedated and paralyzed to assist oropharyngeal packing, he is not a candidate for transfer at this time.    If we are able to see some improvement, get him extubated and have him able to interact he would be a candidate for evaluation if he still requires it and they have capacity.    We will now terminate the search for transplant center transfer as he will not be a candidate in his current state and has already been rejected from several centers.  If he improves, is extubated and is interactive and still has severe liver disease will recommend reaching back out to [facility]ology for consideration of transplant evaluation.    Cody Orellana M.D.

## 2022-02-24 NOTE — DISCHARGE PLANNING
Anticipated Discharge Disposition: TBD - ongoing goals of care discussion    Action: Patient discussed in IDT rounds. Patient is medically complex. He is vented, sedated and paralyzed to tolerate packing. Dialysis today. Palliative care consulted and met with family yesterday. Family has decided to change code status today. Efforts have been started to locate a transplant center.     Prior to admission family reports pt was completely independent. No DME need. Patient has [employer] [facility] for coverage. No PCP on file.      Barriers to Discharge: Medically complex     Plan: Continue to assist with social/dc needs     Care Transition Team Assessment    Information Source  Orientation Level: Unable to assess  Information Given By: Relative  Informant's Name: Parents and grandma  Who is responsible for making decisions for patient? : Legal next of kin  Name(s) of Primary Decision Maker: parents    Readmission Evaluation  Is this a readmission?: No    Elopement Risk  Legal Hold: No  Ambulatory or Self Mobile in Wheelchair: No-Not an Elopement Risk  Elopement Risk: Not at Risk for Elopement    Interdisciplinary Discharge Planning  Primary Care Physician: None on file  Patient or legal guardian wants to designate a caregiver: No    Discharge Preparedness  What is your plan after discharge?: Uncertain - pending medical team collaboration  What are your discharge supports?: Parent  Prior Functional Level: Ambulatory,Independent with Activities of Daily Living,Independent with Medication Management    Functional Assesment  Prior Functional Level: Ambulatory,Independent with Activities of Daily Living,Independent with Medication Management    Finances  Financial Barriers to Discharge: No  Prescription Coverage: Yes    Vision / Hearing Impairment  Vision Impairment : No  Hearing Impairment : No    Advance Directive  Advance Directive?: Clinically incapacitated / Inappropriate    Domestic Abuse  Have you ever been the  victim of abuse or violence?: No  Physical Abuse or Sexual Abuse: No  Verbal Abuse or Emotional Abuse: No  Possible Abuse/Neglect Reported to:: Not Applicable    Psychological Assessment  History of Substance Abuse: None  History of Psychiatric Problems: No  Non-compliant with Treatment: No  Newly Diagnosed Illness: Yes    Discharge Risks or Barriers  Discharge risks or barriers?: No PCP,Post-acute placement / services,Complex medical needs  Patient risk factors: Complex medical needs    Anticipated Discharge Information  Discharge Disposition: Still a Patient (Ongoing GOC discussion)

## 2022-02-24 NOTE — CARE PLAN
Problem: Pain - Standard  Goal: Alleviation of pain or a reduction in pain to the patient’s comfort goal  Outcome: Progressing     Problem: Skin Integrity  Goal: Skin integrity is maintained or improved  Outcome: Progressing     Problem: Safety - Medical Restraint  Goal: Remains free of injury from restraints (Restraint for Interference with Medical Device)  Outcome: Progressing     The patient is Unstable - High likelihood or risk of patient condition declining or worsening    Shift Goals  Clinical Goals: Hemodynamic stability, decreased bleeding from mouth  Patient Goals: Unable to assess  Family Goals: Patient comfort, stable vitals, any updates on changes    Progress made toward(s) clinical / shift goals:  With vasopressors titrated per MAR, BP remians within goal parameters overnight. Patient continues to bleed out mouth/nose, large clots removed.

## 2022-02-24 NOTE — WOUND TEAM
Wound team note:   Pt seen for placement of BMS. MD order verified. Pt assessed, noted to be incontinent of loose brown stool. Sacrum/buttocks pink and intact. Sphincter tone determined to be adequate. BMS placed without difficulty, 42 mL of tap water placed in retention balloon, irrigated with 60 mL warm tap water. Confirmed irrigant/stool output in tube Yes. Nursing to irrigate q shift with 60 mL-120 mL warm tap water or until patent.

## 2022-02-24 NOTE — ASSESSMENT & PLAN NOTE
"\"I have updated his mom and former spouse at the bedside.  We discussed his continued worsening despite all efforts.  I explained that he was now deeply sedated and paralyzed in order to tolerate the oropharyngeal packing in efforts to tamponade his bleeding.  We also discussed that his bilirubin is worsening and the diffuse bleeding that we are seeing is likely due to his liver failing.  We discussed hepatic encephalopathy and that prior to intubation he was significantly worse than even on arrival.    They describe a recent history where he would try to treat his depression with alcohol.  He had determined to quit drinking and was trying cleanses at home and had refused to come in for medical evaluation despite their encouragement.  They describe him as a loving but stubborn man.  He is well loved by his family and his children and they request that we continue to do everything possible for him.  I assured them that we would and we would not give up on him.    I did discuss end-of-life matters with them.  I told them that with his liver disease, respiratory and renal failure and now bleeding profusely from his oropharynx if his heart were to stop the utility of CPR would not lead to a meaningful outcome.  I recommended that we do continue to do everything we possibly can to save his life but that if his heart were to stop on its own that we allow him to die naturally and not do CPR.  They agreed to this course so we will make him DNR/I OKAY.    Palliative care has spoken to his family and they and I are greatly appreciative of their help in this difficult time.    Rejected from transplant centers - [facility] is willing to revist the issue if he is extubated and able to participate in conversations\" -- from Dr. Orellana's conversations on 2/25-2/27  "

## 2022-02-24 NOTE — ASSESSMENT & PLAN NOTE
Nasopharynx packed 2/25 by ENT -->removed today  Daily TEG  We will continue to correct coagulopathy as it presents  Noted no further bleeding

## 2022-02-24 NOTE — CARE PLAN
Problem: Ventilation  Goal: Ability to achieve and maintain unassisted ventilation or tolerate decreased levels of ventilator support  Description: Target End Date:  4 days     Document on Vent flowsheet    1.  Support and monitor invasive and noninvasive mechanical ventilation  2.  Monitor ventilator weaning response  3.  Perform ventilator associated pneumonia prevention interventions  4.  Manage ventilation therapy by monitoring diagnostic test results  Outcome: Not Met                                   Ventilator Daily Summary     Vent Day #3    8.0 @ 26    Ventilator settings changed this shift:  no  Weaning trials:  no  Respiratory Procedures:  no  Plan: Continue current ventilator settings and wean mechanical ventilation as tolerated per physician orders.   [phone]

## 2022-02-24 NOTE — CARE PLAN
The patient is Unstable - High likelihood or risk of patient condition declining or worsening    Shift Goals  Clinical Goals: Hemodynamic stability, decreased bleeding from mouth  Patient Goals: Unable to assess  Family Goals: Patient comfort, stable vitals, any updates on changes    Progress made toward(s) clinical / shift goals:    Problem: Pain - Standard  Goal: Alleviation of pain or a reduction in pain to the patient’s comfort goal  Outcome: Progressing     Problem: Skin Integrity  Goal: Skin integrity is maintained or improved  Outcome: Progressing       Patient is not progressing towards the following goals:

## 2022-02-24 NOTE — PROCEDURES
Date: 02/24/22   Time: 2:39 PM     Procedure: Bronchoscopy with bronchoalveolar lavage and therapeutic aspiration of secretions    Indication: Hypoxia and hemoptysis  Consent: Informed consent obtained from patient or designated decision maker after explaining the benefits/risks of the procedure including but not limited to bleeding, infection, airway trauma or loss thereof, pneumothorax/hemothorax, arrythmia, or death. Patient or surrogate expressed understanding and agreement and signed consent which can be found in the patient's chart.    Procedure: After obtaining consent, a time-out was performed. Respiratory therapy and nursing at bedside throughout procedure. Patient provided sedation and analgesia throughout the procedure. Placed on full ventilator support with an FiO2 of 100% throughout the procedure. Using a fiberoptic bronchoscope, trachea entered via ETT.  Airways visualized directly and the following intervention was performed: diagnostic and therapeutic lavage with all areas of lungs suctioned to clear.     Findings as below. Patient tolerated procedure well without any difficulties and left in care of bedside nurse/RT.     Medications: Rocuronium, Propofol, Fentanyl    Findings: Upper airway - Not visualized as bronchoscope passed through ETT.        Trachea to grzegorz - normal appearing mucosa without lesions or mass, ETT tip measured 0.5 cm from the grzegorz.  ETT was retracted with bronchoscopic guidance to 2cm proximal to grzegorz        R proximal and distal airways - Normal appearing mucosa without mass/lesion/anatomic variance, secretions: bloody secretions, likely from oropharynx were noted and suctioned mostly in RLL        L proximal and distal airways - normal appearing mucosa without mass/lesion/anatomic variance, secretions: bloody secretions, likely from oropharynx were noted and suctioned mostly in LLL        Samples - None    Impression: Blood found in right lower and left lower bronchi is  likely representative from the large amount of bleeding from his oropharynx which is migrated down past his endotracheal tube.  His oropharynx was inspected and found to have a significant burden of bright red blood and large clots that were suctioned with a Yankauer.  I then packed his mouth with TXA soaked gauze to try and prevent further bleeding and we are administering more FFP/cryoprecipitate.    Complications: None

## 2022-02-24 NOTE — WOUND TEAM
"[facility]  Inpatient Services  Initial Wound and Skin Care Evaluation    Admission Date: 2/21/2022     Last order of IP CONSULT TO WOUND CARE was found on 2/22/2022 from Hospital Encounter on 2/21/2022     HPI, PMH, SH: Reviewed    Past Surgical History:   Procedure Laterality Date   • DC UPPER GI ENDOSCOPY,DIAGNOSIS  2/22/2022    Procedure: GASTROSCOPY;  Surgeon: Dinesh Johnston M.D.;  Location: SURGERY SAME DAY [facility];  Service: Gastroenterology     Social History     Tobacco Use   • Smoking status: Current Some Day Smoker   • Smokeless tobacco: Never Used   Substance Use Topics   • Alcohol use: Not Currently     Comment: weaning off since feb     Chief Complaint   Patient presents with   • GI Bleeding     Pt BIB [facility] for hematemesis ~1liter bright red blood out per ems. Pt states he's been weaning himself off of etoh since January 15, noticed dark maroon color of emesis since he was weaning himself off.      Diagnosis: GIB (gastrointestinal bleeding) [K92.2]    Unit where seen by Wound Team: S117/00     WOUND CONSULT/FOLLOW UP RELATED TO: Sacrum, pannus, bilateral groin    WOUND HISTORY:  Per MD note: \"37 y.o. male who presented 2/21/2022 with a past medical history significant for alcohol abuse for the last 4+ years worsening over the last several months after he lost his job as well as obesity and hypertension who has not seen a physician or take any medications for many years.  He is brought in by EMS at the request of his family for worsening confusion, drowsiness, and 3 days of hematemesis with associated melena.  His 15-year-old son who is at bedside and lives with his father, reports that patient has been vomiting approximately 2-3 times per day a moderate amount of blood and is having 1-2 melanotic stools per day worsening over the last 24 hours.  Patient has been refusing to seek medical attention.  The patient reports stopping alcohol approximately 3 weeks ago and normally drinks " "a pint of vodka per day.  He does not take any nonsteroidal anti-inflammatory drugs nor use any anticoagulation medication.  He denies any prior history of gastrointestinal hemorrhage nor has he been evaluated by a gastroenterologist for liver disease in the past.  He states that he has had decreased urine output over the last 24 hours.  EMS found him with significant blood around him (1 L of bright red blood) and borderline hypotension.  He was evaluated by the emergency physician as well as hospitalist and found to be in multiorgan failure in the setting of GI bleed and requested a consultation for ICU admission and critical care management.\"    WOUND ASSESSMENT/LDA  Wound 02/22/22 Full Thickness Wound Sacrum;Buttocks Bilateral POA Venous pooling vs skin failure (Active)   Wound Image   02/23/22 1345   Site Assessment Brown;Purple;Dark edges    Periwound Assessment Intact;Brown;Hyperpigmented    Margins Attached edges;Undefined edges    Closure Open to air    Drainage Amount None    Periwound Protectant Barrier Paste    Dressing Options Open to Air    Dressing Changed Observed    Dressing Status Intact    Dressing Change/Treatment Frequency As Needed    NEXT Dressing Change/Treatment Date 02/25/22    Non-staged Wound Description Full thickness    Shape Irregular    Wound Odor None    Pulses N/A    Exposed Structures KADEN    WOUND NURSE ONLY - Time Spent with Patient (mins) 30      Moisture Associated Skin Damage 02/23/22 Groin;Panus;Perineum (Active)   Wound Image   02/23/22 1345   NEXT Weekly Photo (Inpatient Only) 03/02/22    Drainage Amount None    Periwound Assessment Red;Dark edges    IAD Cleansing Foam Cleanser/Washcloth    Periwound Protectant Antifungal Therapy    IAD Containment Device Indwelling Catheter;Bowel Management System      Vascular:    BASIM:   No results found.    Lab Values:    Lab Results   Component Value Date/Time    WBC 24.8 (H) 02/23/2022 05:25 AM    RBC 2.52 (L) 02/23/2022 05:25 AM    " HEMOGLOBIN 9.1 (L) 02/23/2022 11:28 AM    HEMATOCRIT 24.4 (L) 02/23/2022 05:25 AM        Culture Results show:  No results found for this or any previous visit (from the past 720 hour(s)).    Pain Level/Medicated:  None       INTERVENTIONS BY WOUND TEAM:  Chart and images reviewed. Discussed with bedside RN. All areas of concern (based on picture review, LDA review and discussion with bedside RN) have been thoroughly assessed. Documentation of areas based on significant findings. This RN in to assess patient. Performed standard wound care which includes appropriate positioning, dressing removal and non-selective debridement. Pictures and measurements obtained weekly if/when required.  Preparation for Dressing removal: NA  Non-selectively Debrided with:  NA  Sharp debridement: NA  Jennifer wound: Cleansed with moist washcloths  Primary Dressing: NA (ordered barrier paste)  Secondary (Outer) Dressing: NA    Interdisciplinary consultation: Patient, Bedside RN (Rachel), Wound RN (Renee)    EVALUATION / RATIONALE FOR TREATMENT:  Most Recent Date:  2/23/22: Closed sacral non-blanching discoloration with irregular borders. If wound does evolve, will likely evolve into open full thickness wound. At this time, unable to distinguish if wound is related to venous pooling vs skin failure. Pannus and bilateral groin with yeast-like appearing MASD, nystatin powder already ordered and in place. WT will not follow, but re-consult if wound opens or worsens.     Goals: Steady decrease in wound area and depth weekly.    WOUND TEAM PLAN OF CARE ([X] for frequency of wound follow up,):   Nursing to follow orders written for wound care. Contact wound team if area fails to progress, deteriorates or with any questions/concerns  Dressing changes by wound team:                   Follow up 3 times weekly:                NPWT change 3 times weekly:     Follow up 1-2 times weekly:      Follow up Bi-Monthly:                   Follow up as needed:    X  Other (explain):     NURSING PLAN OF CARE ORDERS (X):  Dressing changes: See Dressing Care orders:   Skin care: See Skin Care orders:   RN Prevention Protocol: X  Rectal tube care: See Rectal Tube Care orders:   Other orders:    RSKIN:   CURRENTLY IN PLACE (X), APPLIED THIS VISIT (A), ORDERED (O):   Q shift Nirav:  X  Q shift pressure point assessments:  X    Surface/Positioning   Pressure redistribution mattress            Low Airloss          Bariatric foam      Bariatric PATRICIA   X  Waffle cushion        Waffle Overlay          Reposition q 2 hours    X  TAPs Turning system   X  Z Miguel Angel Pillow     Offloading/Redistribution N/A  Sacral Mepilex (Silicone dressing)     Heel Mepilex (Silicone dressing)         Heel float boots (Prevalon boot)             Float Heels off Bed with Pillows           Respiratory   Silicone O2 tubing         Gray Foam Ear protectors     Cannula fixation Device (Tender )          High flow offloading Clip    Elastic head band offloading device    X  Anchorfast                                                         Trach with Optifoam split foam             Containment/Moisture Prevention     Rectal tube or BMS  A  Purwick/Condom Cath        Fernández Catheter  X  Barrier wipes           Barrier paste     O  Antifungal tx    X  Interdry    O    Mobilization N/A      Up to chair        Ambulate      PT/OT      Nutrition       Dietician        Diabetes Education      PO     TF     TPN     NPO X  # days     Other    Anticipated discharge plans: TBD pending medical stability  LTACH:        SNF/Rehab:                  Home Health Care:           Outpatient Wound Center:            Self/Family Care:        Other:                  Vac Discharge Needs:   Not Applicable Pt not on a wound vac:     X  Regular Vac while inpatient, alternative dressing at DC:        Regular Vac in use and continued at DC:            Reg. Vac w/ Skin Sub/Biologic in use. Will need to be changed 2x wkly:      Veraflo Vac  while inpatient, ok to transition to Regular Vac on Discharge:           Veraflo Vac while inpatient, will need to remain on Veraflo Vac upon discharge:

## 2022-02-24 NOTE — ASSESSMENT & PLAN NOTE
"**Patient is not a transplant candidate  \"2/24:  - Meld: 39 today (40 on admission)  - He is now on daily dialysis  - Maddrey 60 today (69 on admission)    Now coagulopathic, diffuse oropharyngeal bleeding - mouth packed with TXA gauze  Not following commands when sedation is lifted - now RASS -5 with paralysis to tolerate gauze    ON 2/24 I began reaching out to liver transplant centers to see if he is a candidate for transfer / evaluation  Limiting factors will be weight of 436lbs, and recent alcohol use  Positive factors are a recent demonstration of willingness to stop drinking, strong family support    Afternoon 2/24:  I had the opportunity to speak with transplant at [facility].  They do have an accelerated pathway for transplant evaluation in patients who have alcoholic liver disease.  However it requires the patient to be awake, alert and interactive with transplant psychologists for the proper evaluation.  Given the fact that he had worsening obtundation due to liver failure and is now intubated, sedated and paralyzed to assist oropharyngeal packing, he is not a candidate for transfer at this time.    If we are able to see some improvement, get him extubated and have him able to interact he would be a candidate for evaluation if he still requires it and they have capacity.    We will now terminate the search for transplant center transfer as he will not be a candidate in his current state and has already been rejected from several centers.  If he improves, is extubated and is interactive and still has severe liver disease will recommend reaching back out to [facility] for consideration of transplant evaluation.\" --taken from Dr. Orellana's notes  "

## 2022-02-24 NOTE — DIETARY
Nutrition Support Assessment:  Day 3 of admit.  Cody Gordon is a 37 y.o. male with admitting DX of GIB (gastrointestinal bleeding).     Current problem list:  1. GIB (gastrointestinal bleeding)  2. Acute liver failure with hepatic coma  3. Oropharyngeal bleeding  4. Hepatic encephalopathy  5. Acute renal failure with tubular necrosis  6. Alcohol abuse  7. Decompensated hepatic cirrhosis  8. Hypotension due to hypovolemia  9. Class 3 severe obesity  10. Acute blood loss anemia  11. On mechanical assisted ventilation  12. Leukemoid reaction     Assessment:  Estimated Nutritional Needs based on:   Height: 182.9 cm (6')  Weight: (!) 198 kg (436 lb 4.7 oz)  Weight to Use in Calculations: 193 kg (424 lb 9.7 oz) (first bed scale weight)  Ideal Body Weight: 80.7 kg (178 lb)  Body mass index is 59.17 kg/m²., BMI classification: Class 3 obesity    Calculation/Equation: PSU (VE 9.5, Tmax 37.3) = 3095 kcal. REE x 1.2 = 2480 kcal. 22-25 kcal/kg IBW per ASPEN guidelines for critically ill obese = 1775 - 2019 kcal.  Total Calories / day: 1775 - 2000  (Calories / kg IBW: 22 - 25)  Total Grams Protein / day: 161 - 177  (Grams Protein / k - 2.2 IBW, 0.8 - 0.9 Actual weight)     Evaluation:   1. Pt is now day #3 NPO. Intubated. Consult received for tube feeding.  2. Cortrak placement pending.  3. Propofol at 83.2 ml/hour providing 2196 kcal in 24 hours. Levophed at 20 mcg/hour. Fentanyl at 100 mcg/hour. Rocuronium at 4 mcg/kg/min.  4. Medications include Lactulose, MVI, Thiamine, Folic Acid.  5. Labs 2/24 include K+ 4, Glu 135 (H), BUN 74 (H), Creat 5.69 (H), Phos 7.9 (H), Mg 2.7 (H), Triglycerides 184 (H)  6. LBM 2/24, black.   7. Pt with oropharyngeal bleed, paralyzed to tolerate packing.  8. Pt with [facility] on HD.  9. High protein formula Peptamen Intense VHP appropriate for pt sedated on high rate of propofol.  10. Discussed with MD and RN. Start trickle feeds at 10 ml/hour.     Malnutrition Risk: Unable to fully  assess.     Recommendations/Plan:  1. Start Peptamen Intense VHP at 10 ml/hour, do not advance without MD order.  2. Once ok to advance tube feed per MD, advance to goal rate of 50 ml/hour while on high rate of propofol to provide 1200 kcal, 110 gm protein, and 1008 ml free water in 24 hours.  3. Once off propofol, advance to goal rate of 75 ml/hour to provide 1800 kcal, 165 gm protein, and 1512 ml free water in 24 hours.  4. Fluids per MD.  5. Monitor weight.    RD following

## 2022-02-24 NOTE — PROGRESS NOTES
[facility] -  PROGRESS NOTE               Author: Alondra Kent M.D. Date & Time: 2/24/2022  10:16 AM     HPI:  37 year old male with medical H/o Alcohol abuse for the last four years, Obesity and Hypertension is admitted on 02/21 with Acute Upper GI bleed and decompensated cirrhosis. He has been having hematemesis and melena for the last one day and he has not seeked medical attention.  As per notes, patient drinks one pint of vodka daily. No H/o of NSAID's or other anticoagulation. He was admitted to ICU for further management. This morning, patient was intubated as he was desaturating and encephalopathy and also with the need for endoscopy.     He was started on PPI, octreotide, Midodrine and vasopresors for blood support. Labs showed Hb of 9.8, BUN/creatinine of 99/6.28. Ultrasound of abdomen showed no hydronephrosis. Ammonia is elevated at 151. Chest x ray showed pulmonary infiltrates.      DAILY NEPHROLOGY SUMMARY:  02/22: Dialysis catheter placed and underwent 2 hours of dialysis.  Endoscopy was done which showed Blanca-Villa tear and erosive esophagitis  02/23: On pressors, blood pressure still soft and patient looks grossly swollen.  Receiving the second day of dialysis today.   02/24: Patient is still on pressors and is having continued bleeding. Kidney function looks little better.        ROS  Unable to obtain as the patient is intubated.     Physical Exam  Constitutional:       Appearance: He is obese.      Comments: Intubated   HENT:      Head:      Comments: Gross blood noted around the nostrils     Mouth/Throat:      Mouth: Mucous membranes are dry.   Eyes:      Extraocular Movements: Extraocular movements intact.      Pupils: Pupils are equal, round, and reactive to light.   Cardiovascular:      Rate and Rhythm: Normal rate and regular rhythm.      Pulses: Normal pulses.      Heart sounds: Normal heart sounds.   Pulmonary:      Effort: Pulmonary effort is normal.       Breath sounds: Normal breath sounds.   Abdominal:      General: Bowel sounds are normal.      Palpations: Abdomen is soft.   Musculoskeletal:         General: Swelling present. Normal range of motion.      Cervical back: Normal range of motion.   Neurological:      Comments: Unable to assess completely     PAST FAMILY HISTORY: Reviewed and Unchanged  SOCIAL HISTORY: Reviewed and Unchanged  CURRENT MEDICATIONS: Reviewed  IMAGING STUDIES: Reviewed    Fluids:  In: 2480.9 [I.V.:1890.9; Dialysis:500; Enteral:90]  Out: 3840     LABS:  Recent Results (from the past 24 hour(s))   HGB (Hemoglobin) for 48 hours    Collection Time: 02/23/22 11:28 AM   Result Value Ref Range    Hemoglobin 9.1 (L) 14.0 - 18.0 g/dL   POCT arterial blood gas device results    Collection Time: 02/24/22  4:06 AM   Result Value Ref Range    Ph 7.334 (L) 7.400 - 7.500    Pco2 45.7 (H) 26.0 - 37.0 mmHg    Po2 70 64 - 87 mmHg    Tco2 26 20 - 33 mmol/L    S02 92 (L) 93 - 99 %    Hco3 24.3 17.0 - 25.0 mmol/L    BE -2 -4 - 3 mmol/L    Body Temp 98.2 F degrees    O2 Therapy 80 %    iPF Ratio 88     Ph Temp Yamini 7.337 (L) 7.400 - 7.500    Pco2 Temp Co 45.3 (H) 26.0 - 37.0 mmHg    Po2 Temp Cor 69 64 - 87 mmHg    Specimen Arterial     DelSys Vent     Tidal Volume 430 mL    Peep End Expiratory Pressure 12 cmh20    Set Rate 22     Mode APV-CMV    Comp Metabolic Panel (CMP)    Collection Time: 02/24/22  5:38 AM   Result Value Ref Range    Sodium 136 135 - 145 mmol/L    Potassium 4.0 3.6 - 5.5 mmol/L    Chloride 100 96 - 112 mmol/L    Co2 21 20 - 33 mmol/L    Anion Gap 15.0 7.0 - 16.0    Glucose 135 (H) 65 - 99 mg/dL    Bun 74 (HH) 8 - 22 mg/dL    Creatinine 5.69 (HH) 0.50 - 1.40 mg/dL    Calcium 8.8 8.5 - 10.5 mg/dL    AST(SGOT) 153 (H) 12 - 45 U/L    ALT(SGPT) 75 (H) 2 - 50 U/L    Alkaline Phosphatase 161 (H) 30 - 99 U/L    Total Bilirubin 31.2 (H) 0.1 - 1.5 mg/dL    Albumin 3.3 3.2 - 4.9 g/dL    Total Protein 6.4 6.0 - 8.2 g/dL    Globulin 3.1 1.9 - 3.5 g/dL     A-G Ratio 1.1 g/dL   Magnesium    Collection Time: 02/24/22  5:38 AM   Result Value Ref Range    Magnesium 2.7 (H) 1.5 - 2.5 mg/dL   PHOSPHORUS    Collection Time: 02/24/22  5:38 AM   Result Value Ref Range    Phosphorus 7.9 (H) 2.5 - 4.5 mg/dL   CBC with Differential    Collection Time: 02/24/22  5:38 AM   Result Value Ref Range    WBC 23.9 (H) 4.8 - 10.8 K/uL    RBC 2.34 (L) 4.70 - 6.10 M/uL    Hemoglobin 8.0 (L) 14.0 - 18.0 g/dL    Hematocrit 23.0 (L) 42.0 - 52.0 %    MCV 98.3 (H) 81.4 - 97.8 fL    MCH 34.2 (H) 27.0 - 33.0 pg    MCHC 34.8 33.7 - 35.3 g/dL    RDW 72.2 (H) 35.9 - 50.0 fL    Platelet Count 202 164 - 446 K/uL    MPV 11.1 9.0 - 12.9 fL    Neutrophils-Polys 88.50 (H) 44.00 - 72.00 %    Lymphocytes 4.40 (L) 22.00 - 41.00 %    Monocytes 7.10 0.00 - 13.40 %    Eosinophils 0.00 0.00 - 6.90 %    Basophils 0.00 0.00 - 1.80 %    Nucleated RBC 0.00 /100 WBC    Neutrophils (Absolute) 21.15 (H) 1.82 - 7.42 K/uL    Lymphs (Absolute) 1.05 1.00 - 4.80 K/uL    Monos (Absolute) 1.70 (H) 0.00 - 0.85 K/uL    Eos (Absolute) 0.00 0.00 - 0.51 K/uL    Baso (Absolute) 0.00 0.00 - 0.12 K/uL    NRBC (Absolute) 0.00 K/uL    Hypochromia 1+     Anisocytosis 2+ (A)     Macrocytosis 2+ (A)    Prothrombin Time    Collection Time: 02/24/22  5:38 AM   Result Value Ref Range    PT 19.4 (H) 12.0 - 14.6 sec    INR 1.69 (H) 0.87 - 1.13   Triglyceride    Collection Time: 02/24/22  5:38 AM   Result Value Ref Range    Triglycerides 184 (H) 0 - 149 mg/dL   ESTIMATED GFR    Collection Time: 02/24/22  5:38 AM   Result Value Ref Range    GFR If  14 (A) >60 mL/min/1.73 m 2    GFR If Non African American 11 (A) >60 mL/min/1.73 m 2   DIFFERENTIAL MANUAL    Collection Time: 02/24/22  5:38 AM   Result Value Ref Range    Manual Diff Status PERFORMED    PERIPHERAL SMEAR REVIEW    Collection Time: 02/24/22  5:38 AM   Result Value Ref Range    Peripheral Smear Review see below    PLATELET ESTIMATE    Collection Time: 02/24/22  5:38 AM    Result Value Ref Range    Plt Estimation Normal    MORPHOLOGY    Collection Time: 02/24/22  5:38 AM   Result Value Ref Range    RBC Morphology Present     Poikilocytosis 2+     Target Cells 2+        (click the triangle to expand results)      IMPRESSION:  # CHI: Unclear Baseline Cr, increased to 5.69--> 6.52 . Likely 2/2 pre renal and ATN with GI bleeding and Hypotension. Cannot rule out hepatorenal syndrome, however would treat other possible etiologies first.   # HTN  # Acute Upper GI bleeding   # Decompensated hepatic cirrhosis  #Acute anemia   #Hepatic encephalopathy    PLAN:  -Will continue dialysis today as well. He might need ongoing dialysis as long as he tolerates. However, will not change his mortality given his liver failure. Primary team to have family discussion today. Unfortunately poor prognosis.   -Continue albumin, midodrine   -Strict I/Os  -Dose all meds per eGFR < 10  -Rest of the management as per primary team   -Will continue to follow

## 2022-02-24 NOTE — PALLIATIVE CARE
Palliative Care follow-up  Discussed with Dr. Orellana, appreciate updates. Pt now DNAR.     No family at bedside. Met family in waiting area, they verbalized feeling positive and remaining hopeful. Family denies any needs at this time.     Active listening, education, validation, normalization, therapeutic touch, and emotional support provided.       Plan:   Continue to support pt and family, continue GOC discussion as clinica picture unfolds.       Thank you for allowing Palliative Care to participate in this patient's care. Please feel free to call [phone] with any questions or concerns.

## 2022-02-24 NOTE — PROGRESS NOTES
"Critical Care Progress Note    Date of admission  2/21/2022    Chief Complaint  \"37 y.o. male who presented 2/21/2022 with a past medical history significant for alcohol abuse for the last 4+ years worsening over the last several months after he lost his job as well as obesity and hypertension who has not seen a physician or take any medications for many years.  He is brought in by EMS at the request of his family for worsening confusion, drowsiness, and 3 days of hematemesis with associated melena.  His 15-year-old son who is at bedside and lives with his father, reports that patient has been vomiting approximately 2-3 times per day a moderate amount of blood and is having 1-2 melanotic stools per day worsening over the last 24 hours.  Patient has been refusing to seek medical attention.  The patient reports stopping alcohol approximately 3 weeks ago and normally drinks a pint of vodka per day.  He does not take any nonsteroidal anti-inflammatory drugs nor use any anticoagulation medication.  He denies any prior history of gastrointestinal hemorrhage nor has he been evaluated by a gastroenterologist for liver disease in the past.  He states that he has had decreased urine output over the last 24 hours.  EMS found him with significant blood around him (1 L of bright red blood) and borderline hypotension.  He was evaluated by the emergency physician as well as hospitalist and found to be in multiorgan failure in the setting of GI bleed and requested a consultation for ICU admission and critical care management.  GI was not consulted in the ED as the emergency physicians have been requested to \"not consult to gastroenterology in the middle the night unless an emergent procedure needs to be done.\" - Dr Gonda 2/21/22    Hospital Course  2/21 - admitted overnight to ICU  2/22 - Intubated for HE and need for EGD, dialysis line placed, Nephro/GI consulted.  Vent Day 1  2/23 - Vent Day 2, Dialysis yesterday and planned for " today.  Palliative.  2/4 - VD 3, oropharynx packed with gauze, sedated and paralyzed to tolerate packing.  Began searching for transplant center      Interval Problem Update  Reviewed last 24 hour events:  CBC continues to be relatively stable ~24  Hemoglobin fluctuating but not less than 7  Acidosis now improving with dialysis  Total bilirubin stabilized at 31  Alk phos mildly improving  INR 1.7 today  Diffuse oropharyngeal bleeding  No laceration or clear source identified  Oropharynx packed with TXA soaked gauze  RASS increased -5 and paralyzed to tolerate packing  Reaching out to transplant centers      Review of Systems  Review of Systems   Unable to perform ROS: Intubated        Vital Signs for last 24 hours   Temp:  [36.9 °C (98.4 °F)] 36.9 °C (98.4 °F)  Pulse:  [62-85] 64  Resp:  [15-56] 23  BP: ()/(43-56) 85/43  SpO2:  [88 %-98 %] 93 %    Hemodynamic parameters for last 24 hours       Respiratory Information for the last 24 hours  Vent Mode: APVCMV  Rate (breaths/min): 22  Vt Target (mL): 430  PEEP/CPAP: 14  MAP: 20  Control VTE (exp VT): 423    Physical Exam   Physical Exam  Vitals and nursing note reviewed.   Constitutional:       Appearance: He is morbidly obese. He is ill-appearing and toxic-appearing.      Interventions: He is sedated and intubated.   HENT:      Head: Normocephalic.      Nose: Nose normal.      Mouth/Throat:      Comments: Profuse bleeding in oropharynx  Many fresh clots and bright red blood  Eyes:      General: Scleral icterus present.   Cardiovascular:      Rate and Rhythm: Normal rate and regular rhythm.      Pulses: Normal pulses.   Pulmonary:      Effort: No respiratory distress. He is intubated.      Comments: Diminished breath sounds  Abdominal:      General: Abdomen is protuberant. There is no distension.      Tenderness: There is no abdominal tenderness.   Musculoskeletal:         General: Swelling present. Normal range of motion.      Cervical back: Normal range of  motion. No rigidity.   Skin:     General: Skin is warm and dry.      Coloration: Skin is jaundiced.   Neurological:      Comments: RASS -5  Paralytic infusion         Medications  Current Facility-Administered Medications   Medication Dose Route Frequency Provider Last Rate Last Admin   • artificial tears (EYE LUBRICANT) ophth ointment 1 Application  1 Application Both Eyes Q8HRS Cody Orellana M.D.   1 Application at 02/24/22 1054   • propofol (DIPRIVAN) injection  0-80 mcg/kg/min Intravenous Continuous Cody Orellana M.D. 83.2 mL/hr at 02/24/22 1025 70 mcg/kg/min at 02/24/22 1025   • fentaNYL (SUBLIMAZE) 50 mcg/mL in 50mL (Continuous Infusion)   Intravenous Continuous Cody Orellana M.D. 2 mL/hr at 02/24/22 0930 100 mcg/hr at 02/24/22 0930   • rocuronium (ZEMURON) 250 mg in  mL Infusion  0-16 mcg/kg/min Intravenous Continuous Cody Orellana M.D. 47.5 mL/hr at 02/24/22 1055 4 mcg/kg/min at 02/24/22 1055   • rocuronium (ZEMURON) injection 100 mg  100 mg Intravenous Q2HRS PRN Cody Orellana M.D.       • vasopressin (VASOSTRICT) 20 Units in  mL Infusion  0.03 Units/min Intravenous Continuous Cody Orellana M.D.       • lactulose 20 GM/30ML solution 30 mL  30 mL Enteral Tube Q4HRS Cody Orellana M.D.   30 mL at 02/24/22 0524   • nystatin (MYCOSTATIN) powder   Topical TID Jeremy M Gonda, M.D.   Given at 02/24/22 0524   • norepinephrine (Levophed) 8 mg in 250 mL NS infusion (premix)  0-30 mcg/min Intravenous Continuous Cody Orellana M.D. 37.5 mL/hr at 02/24/22 1050 20 mcg/min at 02/24/22 1050   • lidocaine (XYLOCAINE) 1 % injection 2 mL  2 mL Tracheal Tube Q30 MIN PRN Cody Orellana M.D.       • Pharmacy Consult: Enteral tube insertion - review meds/change route/product selection  1 Each Other PHARMACY TO DOSE Cody Welte, M.D.       • thiamine (Vitamin B-1) tablet 100 mg  100 mg Enteral Tube Q EVENING Cody Orellana M.D.   100 mg at 02/23/22 1743    And   • folic acid (FOLVITE) tablet 1 mg  1 mg Enteral  Tube Q EVENING Cody Orellana M.D.   1 mg at 02/23/22 1742    And   • multivitamin (THERAGRAN) tablet 1 Tablet  1 Tablet Enteral Tube Q EVENING Cody Orellana M.D.   1 Tablet at 02/23/22 1742   • midodrine (PROAMATINE) tablet 10 mg  10 mg Enteral Tube TID WITH MEALS Cody Orellana M.D.   10 mg at 02/24/22 0836   • prednisoLONE (PRELONE) 15 MG/5ML syrup 39.9 mg  39.9 mg Enteral Tube DAILY Cody Orellana M.D.   39.9 mg at 02/24/22 0525   • riFAXIMin (XIFAXAN) tablet 550 mg  550 mg Enteral Tube BID Cody Orellana M.D.   550 mg at 02/24/22 0524   • heparin injection 2,400 Units  2,400 Units Intracatheter PRN Caterina Garrett M.D.   2,400 Units at 02/23/22 1252   • pantoprazole (Protonix) injection 40 mg  40 mg Intravenous BID Jeremy M Gonda, M.D.   40 mg at 02/24/22 0524   • Pharmacy Consult Request - Transition to PO Antibiotics   Other PHARMACY TO DOSE Jeremy M Gonda, M.D.       • ondansetron (ZOFRAN) syringe/vial injection 4 mg  4 mg Intravenous Q4HRS PRN Jeremy M Gonda, M.D.           Fluids    Intake/Output Summary (Last 24 hours) at 2/24/2022 1126  Last data filed at 2/24/2022 1000  Gross per 24 hour   Intake 2731.67 ml   Output 3840 ml   Net -1108.33 ml       Laboratory  Recent Labs     02/22/22  1210 02/23/22  0446 02/24/22  0406   ISTATAPH 7.334* 7.308* 7.334*   ISTATAPCO2 34.0 44.4* 45.7*   ISTATAPO2 66 61* 70   ISTATATCO2 19* 24 26   WEOYAOH9HUV 92* 89* 92*   ISTATARTHCO3 18.1 22.3 24.3   ISTATARTBE -7* -4 -2   ISTATTEMP see below 97.3 F 98.2 F   ISTATFIO2 80 80 80   ISTATSPEC Arterial Arterial Arterial   ISTATAPHTC  --  7.318* 7.337*   LMZUNTME9IT  --  58* 69         Recent Labs     02/22/22  0435 02/22/22  1245 02/23/22  0525 02/23/22  0852 02/24/22  0538   SODIUM 134*   < > 135 136 136   POTASSIUM 4.6   < > 4.5 4.4 4.0   CHLORIDE 101   < > 100 100 100   CO2 18*   < > 19* 18* 21   BUN 99*   < > 84* 92* 74*   CREATININE 6.28*   < > 5.70* 6.36* 5.69*   MAGNESIUM 3.1*  --  2.9*  --  2.7*   PHOSPHORUS 8.5*  --   8.2*  --  7.9*   CALCIUM 8.4*   < > 8.7 8.7 8.8    < > = values in this interval not displayed.     Recent Labs     02/21/22 2111 02/22/22 0435 02/22/22 1245 02/23/22  0525 02/23/22  0852 02/24/22  0538   ALTSGPT  --  73*  --  71*  --  75*   ASTSGOT  --  116*  --  114*  --  153*   ALKPHOSPHAT  --  220*  --  175*  --  161*   TBILIRUBIN  --  27.0*  --  31.6*  --  31.2*   LIPASE 31  --   --   --   --   --    GLUCOSE  --  96   < > 148* 153* 135*    < > = values in this interval not displayed.     Recent Labs     02/22/22 0435 02/22/22 1730 02/23/22  0525 02/24/22  0538   WBC 22.0* 25.7* 24.8* 23.9*   NEUTSPOLYS 82.20* 87.30* 83.20* 88.50*   LYMPHOCYTES 7.30* 6.10* 6.30* 4.40*   MONOCYTES 8.20 5.10 8.90 7.10   EOSINOPHILS 0.40 0.00 0.00 0.00   BASOPHILS 0.50 0.40 0.30 0.00   ASTSGOT 116*  --  114* 153*   ALTSGPT 73*  --  71* 75*   ALKPHOSPHAT 220*  --  175* 161*   TBILIRUBIN 27.0*  --  31.6* 31.2*     Recent Labs     02/22/22 0435 02/22/22 1245 02/22/22 1730 02/22/22  2325 02/23/22  0525 02/23/22  1128 02/24/22  0538   RBC 2.91*  --  2.74*  --  2.52*  --  2.34*   HEMOGLOBIN 9.8*   < > 9.3*   < > 8.6* 9.1* 8.0*   HEMATOCRIT 28.1*  --  27.3*  --  24.4*  --  23.0*   PLATELETCT 221  --  246  --  235  --  202   PROTHROMBTM 22.2*  --   --   --  18.7*  --  19.4*   INR 2.01*  --   --   --  1.61*  --  1.69*    < > = values in this interval not displayed.       Imaging  X-Ray:  I have personally reviewed the images and compared with prior images.    Assessment/Plan  * GIB (gastrointestinal bleeding)- (present on admission)  Assessment & Plan  Blanca Villa and erosive esophagitis  Serial CBC with conservative transfusion strategy    No varices, octreotide did not help HRS, will DC  IV PPI twice daily  No ascites, ok to DC rocephin  Keep n.p.o.    Large bore IV access, has trialysis line now as well    Acute liver failure with hepatic coma (HCC)  Assessment & Plan  This A/P is for transplant evaluation:    Due to  alcohol  Last drink ~ 3 weeks prior to admission  Until then drinking ~ 1 pint vodka daily    2/24:  - Meld: 39 today (40 on admission)  - He is now on daily dialysis  - Maddrey 60 today (69 on admission)    Now coagulopathic, diffuse oropharyngeal bleeding - mouth packed with TXA gauze  Not following commands when sedation is lifted - now RASS -5 with paralysis to tolerate gauze    ON 2/24 I began reaching out to liver transplant centers to see if he is a candidate for transfer / evaluation  Limiting factors will be weight of 436lbs, and recent alcohol use  Positive factors are a recent demonstration of willingness to stop drinking, strong family support    Goals of care, counseling/discussion  Assessment & Plan  I have updated his mom and former spouse at the bedside.  We discussed his continued worsening despite all efforts.  I explained that he was now deeply sedated and paralyzed in order to tolerate the oropharyngeal packing in efforts to tamponade his bleeding.  We also discussed that his bilirubin is worsening and the diffuse bleeding that we are seeing is likely due to his liver failing.  We discussed hepatic encephalopathy and that prior to intubation he was significantly worse than even on arrival.    They describe a recent history where he would try to treat his depression with alcohol.  He had determined to quit drinking and was trying cleanses at home and had refused to come in for medical evaluation despite their encouragement.  They describe him as a loving but stubborn man.  He is well loved by his family and his children and they request that we continue to do everything possible for him.  I assured them that we would and we would not give up on him.    I did discuss end-of-life matters with them.  I told them that with his liver disease, respiratory and renal failure and now bleeding profusely from his oropharynx if his heart were to stop the utility of CPR would not lead to a meaningful outcome.   I recommended that we do continue to do everything we possibly can to save his life but that if his heart were to stop on its own that we allow him to die naturally and not do CPR.  They agreed to this course so we will make him DNR/I OKAY.    Palliative care has spoken to his family and they and I are greatly appreciative of their help in this difficult time.    I have now started to reach out to transplant centers to see if he is a candidate    Oropharyngeal bleeding  Assessment & Plan  Significant bleeding from the oropharynx  I paralyzed and inspected with glide scope  Oropharynx suctioned clear and no laceration or identifiable source was noted  Appears to be just diffuse bleeding    Given this I elected to paralyze him and put him on a rocuronium drip  I packed his oropharynx down to his epiglottis with TXA soaked Kerlix    We will leave in place for 24 hours and continue paralysis for 24 hours  We will reevaluate tomorrow and repack as needed    Repeating TEG & fibrinogen    This bleeding is likely due to his liver failure    Hepatic encephalopathy (HCC)  Assessment & Plan  Begin lactulose and rifaximin  Close neurologic monitoring    Intubated and sedated now    Paralyzed to tolerate oral pharyngeal Kerlix packing for bleeding    Flexi-Seal in place with regular output    Acute renal failure with tubular necrosis (HCC)  Assessment & Plan  Urine output very low  Likely HRS unfortunately    Now on daily dialysis - nephrology to re-evaluate needs daily  For now still volume overloaded, high FiO2 requirements    Alcohol abuse  Assessment & Plan  Significant alcohol abuse with most recent consumption reported 3 weeks ago  Monitor for alcohol withdrawal syndrome using RASS with as needed benzodiazepines  Vitamin supplementation  Alcohol cessation education and resources to be provided    Decompensated hepatic cirrhosis (HCC)  Assessment & Plan  Alcoholic cirrhosis decompensated with associated hepatic  encephalopathy, metabolic acidosis, acute kidney injury, GI bleed  On arrival: Meld score 40, Madrey 69  Monitor synthetic function  Avoid hepatotoxins  Palliative care consultation, poor prognosis unfortunately    After GI and Nephrology have seen him, will discuss with transplant centers, however he is unlikely to be a candidate  Before this, will see if he improves with steroids over 7 days. If he worsens prior to 7 days will initiate transfer.    Daily worsening    Hypotension due to hypovolemia  Assessment & Plan  Now on norepinephrine  Midodrine  Albumin 1g/kg x2 days for potential HRS - Complete    Trend Hgb and transfuse >7    NE requirements increasing  Add vasopressin if required    Repeating TEG & Fibrinogen    Class 3 severe obesity due to excess calories without serious comorbidity in adult (HCC)  Assessment & Plan  Encourage behavioral and dietary modification for weight loss  RD consultation    Acute blood loss anemia  Assessment & Plan  Secondary to gastrointestinal hemorrhage  Now diffuse oropharyngeal bleeding    Packed oropharynx with TXA soaked gauze  TEG & Fibrinogen sent    Serial CBC with conservative transfusion strategy targeting a hemoglobin goal greater than 7    On mechanically assisted ventilation (HCC)  Assessment & Plan  Intubated date: 2/22/22  Goal saturation > 88%  Monitor ventilator waveforms and titrate flow/peep and volumes according.   Lung protective ventilation strategy  CXR PRN: monitor lung volumes and tube/line placement  VAP bundle prevention, oral care, post pyloric feeding  Head of bed > 30 degree  GI prophylaxis: PPI BID  Daily awakening and SBT trials unless contraindicated  Assess / Treat pain  Assess / Treat delirium  Sedation Goal: RASS 0 to -1  Monitor for liberation / early mobility  Respiratory treatments: prn  ABCDEF Bundle     Oxygen requirements are escalating  Continuing daily dialysis  Now on PEEP 16 FiO2 100%    Leukemoid reaction  Assessment & Plan  Likely  reactive at this time  Trend  Has been stable       VTE:  Contraindicated  Ulcer: PPI BID for GI Bleed  Lines: Central Line  Ongoing indication addressed and Fernández Catheter  Ongoing indication addressed    I have performed a physical exam and reviewed and updated ROS and Plan today (2/24/2022). In review of yesterday's note (2/23/2022), there are no changes except as documented above.     Discussed patient condition and risk of morbidity and/or mortality with Family, RN, RT, Pharmacy, Code status disscussed, Charge nurse / hot rounds, Patient, GI and nephrology and Transfer Center (looking for transplant centers)     The patient remains critically ill.  He is intubated and on mechanical ventilation.  Critical care time = 70 minutes in directly providing and coordinating critical care and extensive data review.  No time overlap and excludes procedures.

## 2022-02-24 NOTE — PROGRESS NOTES
HD treatment # 3 today using [facility] temp CVC.Treatment tolerated well.Net UF removed 3000 ml.CVC locked with heparin.Report given to primary Rn.

## 2022-02-25 NOTE — PROGRESS NOTES
[facility] Progress Note    Hemodialysis treatment x 3 hours completed as ordered per Dr. KISHAN Garrett  Treatment started at 1139 and ended at 1440.    Net UF Removed: 3,500 mL     Patient tolerated treatment well with vasopressor supportx 1-Levophed maintained @ 6mcg/min. (See MAR)   Patient VS stable during and post treatment.   Patient with right IJ non tunneled triple lumen CVC with good patency and flow.   See Acute HD flow sheets on clinical data notes under media for details.      Post tx access: Right IJ non tunneled HD catheter  flushed with saline then locked with heparin 1000 units/ml per designated amount in each lumen (see MAR) then clamped, capped aseptically and labeled properly. CVC dressings soiled, changed per policy and labeled accordingly.. Heparin lock to be aspirated prior to next dialysis/CVC use by dialysis RN only. Please do not flush or draw from ports.    Please notify Nephrologist/Dialysis for follow-up.     Report given to primary care nurse FABIOLA Scott RN.

## 2022-02-25 NOTE — PROGRESS NOTES
[facility] -  PROGRESS NOTE               Author: Alondra Kent M.D. Date & Time: 2/25/2022  9:54 AM     HPI:  37 year old male with medical H/o Alcohol abuse for the last four years, Obesity and Hypertension is admitted on 02/21 with Acute Upper GI bleed and decompensated cirrhosis. He has been having hematemesis and melena for the last one day and he has not seeked medical attention.  As per notes, patient drinks one pint of vodka daily. No H/o of NSAID's or other anticoagulation. He was admitted to ICU for further management. This morning, patient was intubated as he was desaturating and encephalopathy and also with the need for endoscopy.     He was started on PPI, octreotide, Midodrine and vasopresors for blood support. Labs showed Hb of 9.8, BUN/creatinine of 99/6.28. Ultrasound of abdomen showed no hydronephrosis. Ammonia is elevated at 151. Chest x ray showed pulmonary infiltrates      DAILY NEPHROLOGY SUMMARY:  02/22: Dialysis catheter placed and underwent 2 hours of dialysis.  Endoscopy was done which showed Blanca-Villa tear and erosive esophagitis  02/23: On pressors, blood pressure still soft and patient looks grossly swollen.  Receiving the second day of dialysis today.   02/24: Patient is still on pressors and is having continued bleeding. Kidney function looks little better  02/25: Patient still remains intubated and on pressors.  Patient is DNR now after family discussion.       ROS  Unable to obtain as the patient is intubated.     Physical Exam  Constitutional:       Appearance: He is obese.      Comments: Intubated   HENT:      Head:      Comments: Gross blood noted around the nostrils     Mouth/Throat:      Mouth: Mucous membranes are dry.   Eyes:      Extraocular Movements: Extraocular movements intact.      Pupils: Pupils are equal, round, and reactive to light.   Cardiovascular:      Rate and Rhythm: Normal rate and regular rhythm.      Pulses: Normal pulses.       Heart sounds: Normal heart sounds.   Pulmonary:      Effort: Pulmonary effort is normal.      Breath sounds: Normal breath sounds.   Abdominal:      General: Bowel sounds are normal.      Palpations: Abdomen is soft.   Musculoskeletal:         General: Swelling present. Normal range of motion.      Cervical back: Normal range of motion.   Neurological:      Comments: Unable to assess completely       PAST FAMILY HISTORY: Reviewed and Unchanged  SOCIAL HISTORY: Reviewed and Unchanged  CURRENT MEDICATIONS: Reviewed  IMAGING STUDIES: Reviewed    Fluids:  In: 3928.6 [I.V.:3164.6; Blood:554; Other:210]  Out: 3923     LABS:  Recent Results (from the past 24 hour(s))   PLATELET MAPPING WITH BASIC TEG    Collection Time: 02/24/22 10:30 AM   Result Value Ref Range    Reaction Time Initial-R 13.7 (H) 4.6 - 9.1 min    React Time Initial Hep 13.7 (H) 4.3 - 8.3 min    Clot Kinetics-K 2.3 (H) 0.8 - 2.1 min    Clot Angle-Angle 41.0 (L) 63.0 - 78.0 degrees    Maximum Clot Strength-MA 66.5 52.0 - 69.0 mm    TEG Functional Fibrinogen(MA) 34.7 (H) 15.0 - 32.0 mm    Lysis 30 minutes-LY30 0.0 0.0 - 2.6 %    % Inhibition ADP 96.4 (H) 0.0 - 17.0 %    % Inhibition AA 34.4 (H) 0.0 - 11.0 %    TEG Algorithm Link Algorithm    FIBRINOGEN    Collection Time: 02/24/22 10:45 AM   Result Value Ref Range    Fibrinogen 191 (L) 215 - 460 mg/dL   CRYOPRECIPITATE    Collection Time: 02/24/22 12:12 PM   Result Value Ref Range    Component Ct       CTP                 Cryoprecipitate     [id number]   transfused   02/24/22   14:49      Product Type CTP     Dispense Status transfused     Unit Number (Barcoded) [id number]     Product Code (Barcoded) B7669C66     Blood Type (Barcoded) 6200     Component Ct       CTP                 Cryoprecipitate     [id number]   transfused   02/24/22   15:54      Product Type CTP     Dispense Status transfused     Unit Number (Barcoded) [id number]     Product Code (Barcoded) I0102W15     Blood Type  (Barcoded) 6200    FRESH FROZEN PLASMA    Collection Time: 02/24/22 12:12 PM   Result Value Ref Range    Component F       TA                  Thawed Plasma       [id number]   transfused   02/24/22   13:03      Product Type Thawed Apheresis Plasma     Dispense Status transfused     Unit Number (Barcoded) [id number]     Product Code (Barcoded) P8848I12     Blood Type (Barcoded) 7300     Component F       TA1                 Thawed Plasma 1     [id number]   transfused   02/24/22   13:29      Product Type Thawed Apheresis Plasma 1st Cont     Dispense Status transfused     Unit Number (Barcoded) [id number]     Product Code (Barcoded) H5250Z68     Blood Type (Barcoded) 8400    POCT arterial blood gas device results    Collection Time: 02/25/22  4:11 AM   Result Value Ref Range    Ph 7.314 (L) 7.400 - 7.500    Pco2 49.7 (H) 26.0 - 37.0 mmHg    Po2 54 (L) 64 - 87 mmHg    Tco2 27 20 - 33 mmol/L    S02 85 (L) 93 - 99 %    Hco3 25.3 (H) 17.0 - 25.0 mmol/L    BE -1 -4 - 3 mmol/L    Body Temp 97.0 F degrees    O2 Therapy 80 %    iPF Ratio 68     Ph Temp Yamini 7.327 (L) 7.400 - 7.500    Pco2 Temp Co 47.8 (H) 26.0 - 37.0 mmHg    Po2 Temp Cor 51 (L) 64 - 87 mmHg    Specimen Arterial     DelSys Vent     Tidal Volume 430 mL    Peep End Expiratory Pressure 16 cmh20    Set Rate 22     Mode APV-CMV    Prealbumin    Collection Time: 02/25/22  5:55 AM   Result Value Ref Range    Pre-Albumin 8.7 (L) 18.0 - 38.0 mg/dL   PLATELET MAPPING WITH BASIC TEG    Collection Time: 02/25/22  5:55 AM   Result Value Ref Range    Reaction Time Initial-R 9.2 (H) 4.6 - 9.1 min    React Time Initial Hep 10.8 (H) 4.3 - 8.3 min    Clot Kinetics-K 1.3 0.8 - 2.1 min    Clot Angle-Angle 72.1 63.0 - 78.0 degrees    Maximum Clot Strength-MA 67.5 52.0 - 69.0 mm    TEG Functional Fibrinogen(MA) 34.3 (H) 15.0 - 32.0 mm    Lysis 30 minutes-LY30 0.0 0.0 - 2.6 %    % Inhibition ADP 96.4 (H) 0.0 - 17.0 %    % Inhibition AA 89.0 (H) 0.0 - 11.0 %    TEG  Algorithm Link Algorithm    Comp Metabolic Panel (CMP)    Collection Time: 02/25/22  5:55 AM   Result Value Ref Range    Sodium 134 (L) 135 - 145 mmol/L    Potassium 4.1 3.6 - 5.5 mmol/L    Chloride 98 96 - 112 mmol/L    Co2 21 20 - 33 mmol/L    Anion Gap 15.0 7.0 - 16.0    Glucose 120 (H) 65 - 99 mg/dL    Bun 61 (H) 8 - 22 mg/dL    Creatinine 4.28 (H) 0.50 - 1.40 mg/dL    Calcium 8.8 8.5 - 10.5 mg/dL    AST(SGOT) 152 (H) 12 - 45 U/L    ALT(SGPT) 70 (H) 2 - 50 U/L    Alkaline Phosphatase 148 (H) 30 - 99 U/L    Total Bilirubin 24.4 (H) 0.1 - 1.5 mg/dL    Albumin 2.7 (L) 3.2 - 4.9 g/dL    Total Protein 6.3 6.0 - 8.2 g/dL    Globulin 3.6 (H) 1.9 - 3.5 g/dL    A-G Ratio 0.8 g/dL   Magnesium    Collection Time: 02/25/22  5:55 AM   Result Value Ref Range    Magnesium 2.5 1.5 - 2.5 mg/dL   PHOSPHORUS    Collection Time: 02/25/22  5:55 AM   Result Value Ref Range    Phosphorus 7.4 (H) 2.5 - 4.5 mg/dL   CBC with Differential    Collection Time: 02/25/22  5:55 AM   Result Value Ref Range    WBC 23.2 (H) 4.8 - 10.8 K/uL    RBC 2.20 (L) 4.70 - 6.10 M/uL    Hemoglobin 7.8 (L) 14.0 - 18.0 g/dL    Hematocrit 22.3 (L) 42.0 - 52.0 %    .4 (H) 81.4 - 97.8 fL    MCH 35.5 (H) 27.0 - 33.0 pg    MCHC 35.0 33.7 - 35.3 g/dL    RDW 76.4 (H) 35.9 - 50.0 fL    Platelet Count 158 (L) 164 - 446 K/uL    MPV 11.3 9.0 - 12.9 fL    Neutrophils-Polys 82.10 (H) 44.00 - 72.00 %    Lymphocytes 7.20 (L) 22.00 - 41.00 %    Monocytes 9.20 0.00 - 13.40 %    Eosinophils 0.10 0.00 - 6.90 %    Basophils 0.20 0.00 - 1.80 %    Immature Granulocytes 1.20 (H) 0.00 - 0.90 %    Nucleated RBC 0.10 /100 WBC    Neutrophils (Absolute) 19.03 (H) 1.82 - 7.42 K/uL    Lymphs (Absolute) 1.68 1.00 - 4.80 K/uL    Monos (Absolute) 2.14 (H) 0.00 - 0.85 K/uL    Eos (Absolute) 0.03 0.00 - 0.51 K/uL    Baso (Absolute) 0.04 0.00 - 0.12 K/uL    Immature Granulocytes (abs) 0.29 (H) 0.00 - 0.11 K/uL    NRBC (Absolute) 0.02 K/uL   Prothrombin Time    Collection Time: 02/25/22   5:55 AM   Result Value Ref Range    PT 19.9 (H) 12.0 - 14.6 sec    INR 1.75 (H) 0.87 - 1.13   Triglycerides    Collection Time: 02/25/22  5:55 AM   Result Value Ref Range    Triglycerides 456 (H) 0 - 149 mg/dL   CRP QUANTITIVE (NON-CARDIAC)    Collection Time: 02/25/22  5:55 AM   Result Value Ref Range    Stat C-Reactive Protein 4.06 (H) 0.00 - 0.75 mg/dL   ESTIMATED GFR    Collection Time: 02/25/22  5:55 AM   Result Value Ref Range    GFR If  19 (A) >60 mL/min/1.73 m 2    GFR If Non  16 (A) >60 mL/min/1.73 m 2       (click the triangle to expand results)      IMPRESSION:  # anuric CHI  - Likely 2/2 ATN from hemodynamic instability  - no hydro   # h/o HTN but now in Shock 2/2 hypovolemia vs advanced liver cirrhosis   - BP meds on hold   # Acute Upper GI bleeding   - EGD: Blanca Villa and erosive esophagitis  # Decompensated ETOH hepatic cirrhosis  # Acute anemia of bleeding  - no indication for KOLTON   # Coagulopathy with bleeding from diffuse oropharyngeal bleeding   - mouth packed with TXA gauze   - Urethral bleeding minimal due to anuria   - m2/2 cirrhosis +/- DIC   # Hepatic encephalopathy  - intubated   # Acute respiratory failure 2/2 pulmonary edema  # Leukocytosis, per primary   # Acidosis 2/2 renal failure, controlled with HD  # Hypoalbuminemia 2/2 liver cirrhosis  # Hyperphosphatemia     PLAN:  - HD today , UF as tolerated with pressor support , midodrine and albumin   - daily eval for HD need   - pressor management per primary team   - Strict I/Os  - Dose all meds per eGFR < 10  - will need phos binder once on Oral feeds otherwise HD will help a bit with Phos management.   - transfuse to keep Hbg >7   - c/w GOC discussion with family  - prognosis remains poor.     Thank you.     The patient was evaluated with the Resident.  I reviewed the note and discussed the findings.  Please see plan in the note for full details regarding this patient.  The chart was reviewed and  summarized.  Available labs, imaging, and pertinent patient data were also reviewed.  Available nursing, consultant, and other records were also reviewed.  I am actively involved in the patient's care.

## 2022-02-25 NOTE — DIETARY
Nutrition Services: Update   Tube feed with Peptamen Intense VHP running at 10 ml/hour and tolerating per nursing. Discussed with MD: OK per MD to advance slowly by 10 ml q day to goal rate.     Recommendations/Plan:  1. Continue Peptamen Intense VHP and advance by 10 ml q day to goal rate of 50 ml/hour while on propofol to provide 1200 kcal, 110 gm protein, and 1008 ml free water in 24 hours.  2. Once off propofol, advance to goal rate of 75 ml/hour to provide 1800 kcal, 165 gm protein, and 1512 ml free water in 24 hours.    RD following

## 2022-02-25 NOTE — CARE PLAN
Problem: Pain - Standard  Goal: Alleviation of pain or a reduction in pain to the patient’s comfort goal  Outcome: Progressing     Problem: Skin Integrity  Goal: Skin integrity is maintained or improved  Outcome: Progressing     Problem: Fall Risk  Goal: Patient will remain free from falls  Outcome: Progressing     Problem: Safety - Medical Restraint  Goal: Free from restraint(s) (Restraint for Interference with Medical Device)  Outcome: Progressing     The patient is Unstable - High likelihood or risk of patient condition declining or worsening    Shift Goals  Clinical Goals: Decreased oral bleeding, hemodynamic stability, adequate sedation  Patient Goals: KADEN  Family Goals: Patient comfort, stable vitals, updates on any changes    Progress made toward(s) clinical / shift goals:  With TXA gauze packed in mouth, no bleeding noted around gauze or out of nose. Vitals stable overnight, levophed titrated per MAR to achieve MAP goals.

## 2022-02-25 NOTE — PROGRESS NOTES
"Critical Care Progress Note    Date of admission  2/21/2022    Chief Complaint  \"37 y.o. male who presented 2/21/2022 with a past medical history significant for alcohol abuse for the last 4+ years worsening over the last several months after he lost his job as well as obesity and hypertension who has not seen a physician or take any medications for many years.  He is brought in by EMS at the request of his family for worsening confusion, drowsiness, and 3 days of hematemesis with associated melena.  His 15-year-old son who is at bedside and lives with his father, reports that patient has been vomiting approximately 2-3 times per day a moderate amount of blood and is having 1-2 melanotic stools per day worsening over the last 24 hours.  Patient has been refusing to seek medical attention.  The patient reports stopping alcohol approximately 3 weeks ago and normally drinks a pint of vodka per day.  He does not take any nonsteroidal anti-inflammatory drugs nor use any anticoagulation medication.  He denies any prior history of gastrointestinal hemorrhage nor has he been evaluated by a gastroenterologist for liver disease in the past.  He states that he has had decreased urine output over the last 24 hours.  EMS found him with significant blood around him (1 L of bright red blood) and borderline hypotension.  He was evaluated by the emergency physician as well as hospitalist and found to be in multiorgan failure in the setting of GI bleed and requested a consultation for ICU admission and critical care management.  GI was not consulted in the ED as the emergency physicians have been requested to \"not consult to gastroenterology in the middle the night unless an emergent procedure needs to be done.\" - Dr Gonda 2/21/22    Hospital Course  2/21 - admitted overnight to ICU  2/22 - Intubated for HE and need for EGD, dialysis line placed, Nephro/GI consulted.  Vent Day 1  2/23 - Vent Day 2, Dialysis yesterday and planned for " today.  Palliative.  2/24 - VD 3, oropharynx packed with gauze, sedated and paralyzed to tolerate packing.  Began searching for transplant center  2/25 - VD 4, rejected from transplant centers (see plan of care note on 2/24), discussed with ENT utility of NP scope to look for source of NP bleeding      Interval Problem Update  Reviewed last 24 hour events:  Rejected from transplant centers  WBC and hemoglobin roughly stable from yesterday  Platelets are decreasing  Triglycerides increasing (requiring high doses propofol)  Total bilirubin starting to decrease (31--> 24)  AST/ALT are roughly stable ~150/70 respectively  Continuing daily dialysis for now  INR slightly higher today at 1.75 from 1.7)  TEG normalized    I removed oropharyngeal packing-bleeding noted proximal to uvula and nasopharynx somewhere  Repacked his mouth, continue paralysis with packing  Consult ENT for possible NP scope to see if there is intervention      Review of Systems  Review of Systems   Unable to perform ROS: Intubated        Vital Signs for last 24 hours   Temp:  [36.3 °C (97.3 °F)-36.4 °C (97.5 °F)] 36.3 °C (97.3 °F)  Pulse:  [55-73] 63  Resp:  [0-64] 42  BP: ()/(43-60) 102/47  SpO2:  [92 %-98 %] 94 %    Hemodynamic parameters for last 24 hours       Respiratory Information for the last 24 hours  Vent Mode: APVCMV  Rate (breaths/min): 24  Vt Target (mL): 430  PEEP/CPAP: 16  MAP: 21  Control VTE (exp VT): 428    Physical Exam   Physical Exam  Vitals and nursing note reviewed.   Constitutional:       Appearance: He is morbidly obese. He is ill-appearing and toxic-appearing.      Interventions: He is sedated and intubated.   HENT:      Head: Normocephalic.      Nose: Nose normal.      Mouth/Throat:      Comments: Profuse bleeding in oropharynx  Many fresh clots and bright red blood  Eyes:      General: Scleral icterus present.   Cardiovascular:      Rate and Rhythm: Normal rate and regular rhythm.      Pulses: Normal pulses.    Pulmonary:      Effort: No respiratory distress. He is intubated.      Comments: Diminished breath sounds  Abdominal:      General: Abdomen is protuberant. There is no distension.      Tenderness: There is no abdominal tenderness.   Musculoskeletal:         General: Swelling present. Normal range of motion.      Cervical back: Normal range of motion. No rigidity.   Skin:     General: Skin is warm and dry.      Coloration: Skin is jaundiced.   Neurological:      Comments: RASS -5  Paralytic infusion         Medications  Current Facility-Administered Medications   Medication Dose Route Frequency Provider Last Rate Last Admin   • oxymetazoline (AFRIN) 0.05 % nasal spray 2 Spray  2 Spray Nasal BID Brian Goldstein M.D.   2 [place] at 02/25/22 1211   • prednisoLONE (PRELONE) 15 MG/5ML syrup 39.9 mg  39.9 mg Oral DAILY Cody Orellana M.D.        Followed by   • [START ON 3/23/2022] prednisoLONE (PRELONE) 15 MG/5ML syrup 30 mg  30 mg Oral DAILY Cody Orellana M.D.        Followed by   • [START ON 3/28/2022] prednisoLONE (PRELONE) 15 MG/5ML syrup 20.1 mg  20.1 mg Oral DAILY Cody Orellana M.D.        Followed by   • [START ON 4/2/2022] prednisoLONE (PRELONE) 15 MG/5ML syrup 9.9 mg  9.9 mg Oral DAILY Cody Orellana M.D.        Followed by   • [START ON 4/7/2022] prednisoLONE (PRELONE) 15 MG/5ML syrup 5.1 mg  5.1 mg Oral DAILY Cody Orellana M.D.       • artificial tears (EYE LUBRICANT) ophth ointment 1 Application  1 Application Both Eyes Q8HRS Cody Orellana M.D.   1 Application at 02/25/22 1337   • propofol (DIPRIVAN) injection  0-80 mcg/kg/min Intravenous Continuous Cody Orellana M.D. 83.2 mL/hr at 02/25/22 1453 70 mcg/kg/min at 02/25/22 1453   • fentaNYL (SUBLIMAZE) 50 mcg/mL in 50mL (Continuous Infusion)   Intravenous Continuous Cody Orellana M.D. 2 mL/hr at 02/25/22 0045 2,500 mcg at 02/25/22 0045   • rocuronium (ZEMURON) 250 mg in  mL Infusion  0-16 mcg/kg/min Intravenous Continuous Cody Orellana M.D. 23.8  mL/hr at 02/25/22 0532 2 mcg/kg/min at 02/25/22 0532   • rocuronium (ZEMURON) injection 100 mg  100 mg Intravenous Q2HRS PRN Cody Orellana M.D.       • vasopressin (VASOSTRICT) 20 Units in  mL Infusion  0.03 Units/min Intravenous Continuous Cody Orellana M.D.   Dose not Required at 02/24/22 1000   • lactulose 20 GM/30ML solution 30 mL  30 mL Enteral Tube Q4HRS Cody Orellana M.D.   30 mL at 02/25/22 1337   • nystatin (MYCOSTATIN) powder   Topical TID Jeremy M Gonda, M.D.   Given at 02/25/22 1106   • norepinephrine (Levophed) 8 mg in 250 mL NS infusion (premix)  0-30 mcg/min Intravenous Continuous Cody Orellana M.D. 11.3 mL/hr at 02/25/22 0847 6 mcg/min at 02/25/22 0847   • lidocaine (XYLOCAINE) 1 % injection 2 mL  2 mL Tracheal Tube Q30 MIN PRN Cody Orellana M.D.       • Pharmacy Consult: Enteral tube insertion - review meds/change route/product selection  1 Each Other PHARMACY TO DOSE Cody Orellana M.D.       • thiamine (Vitamin B-1) tablet 100 mg  100 mg Enteral Tube Q EVENING Cody Orellana M.D.   100 mg at 02/24/22 1716    And   • folic acid (FOLVITE) tablet 1 mg  1 mg Enteral Tube Q EVENING Cody Orellana M.D.   1 mg at 02/24/22 1716    And   • multivitamin (THERAGRAN) tablet 1 Tablet  1 Tablet Enteral Tube Q EVENING Cody Orellana M.D.   1 Tablet at 02/24/22 1716   • midodrine (PROAMATINE) tablet 10 mg  10 mg Enteral Tube TID WITH MEALS Cody Orellana M.D.   10 mg at 02/25/22 1106   • riFAXIMin (XIFAXAN) tablet 550 mg  550 mg Enteral Tube BID Cody Orellana M.D.   550 mg at 02/25/22 0540   • heparin injection 2,400 Units  2,400 Units Intracatheter PRN Caterina Garrett M.D.   2,400 Units at 02/25/22 1445   • pantoprazole (Protonix) injection 40 mg  40 mg Intravenous BID Jeremy M Gonda, M.D.   40 mg at 02/25/22 0540   • Pharmacy Consult Request - Transition to PO Antibiotics   Other PHARMACY TO DOSE Jeremy M Gonda, M.D.       • ondansetron (ZOFRAN) syringe/vial injection 4 mg  4 mg Intravenous Q4HRS PRN  Jeremy M Gonda, M.D.           Fluids    Intake/Output Summary (Last 24 hours) at 2/25/2022 1501  Last data filed at 2/25/2022 1400  Gross per 24 hour   Intake 3627.52 ml   Output 923 ml   Net 2704.52 ml       Laboratory  Recent Labs     02/23/22  0446 02/24/22  0406 02/25/22  0411   ISTATAPH 7.308* 7.334* 7.314*   ISTATAPCO2 44.4* 45.7* 49.7*   ISTATAPO2 61* 70 54*   ISTATATCO2 24 26 27   ASBILDG9RDC 89* 92* 85*   ISTATARTHCO3 22.3 24.3 25.3*   ISTATARTBE -4 -2 -1   ISTATTEMP 97.3 F 98.2 F 97.0 F   ISTATFIO2 80 80 80   ISTATSPEC Arterial Arterial Arterial   ISTATAPHTC 7.318* 7.337* 7.327*   PRCWEHUW5WD 58* 69 51*         Recent Labs     02/23/22  0525 02/23/22  0852 02/24/22  0538 02/25/22  0555   SODIUM 135 136 136 134*   POTASSIUM 4.5 4.4 4.0 4.1   CHLORIDE 100 100 100 98   CO2 19* 18* 21 21   BUN 84* 92* 74* 61*   CREATININE 5.70* 6.36* 5.69* 4.28*   MAGNESIUM 2.9*  --  2.7* 2.5   PHOSPHORUS 8.2*  --  7.9* 7.4*   CALCIUM 8.7 8.7 8.8 8.8     Recent Labs     02/23/22  0525 02/23/22  0852 02/24/22  0538 02/25/22  0555   ALTSGPT 71*  --  75* 70*   ASTSGOT 114*  --  153* 152*   ALKPHOSPHAT 175*  --  161* 148*   TBILIRUBIN 31.6*  --  31.2* 24.4*   PREALBUMIN  --   --   --  8.7*   GLUCOSE 148* 153* 135* 120*     Recent Labs     02/23/22  0525 02/24/22  0538 02/25/22  0555   WBC 24.8* 23.9* 23.2*   NEUTSPOLYS 83.20* 88.50* 82.10*   LYMPHOCYTES 6.30* 4.40* 7.20*   MONOCYTES 8.90 7.10 9.20   EOSINOPHILS 0.00 0.00 0.10   BASOPHILS 0.30 0.00 0.20   ASTSGOT 114* 153* 152*   ALTSGPT 71* 75* 70*   ALKPHOSPHAT 175* 161* 148*   TBILIRUBIN 31.6* 31.2* 24.4*     Recent Labs     02/23/22  0525 02/23/22  1128 02/24/22  0538 02/25/22  0555   RBC 2.52*  --  2.34* 2.20*   HEMOGLOBIN 8.6* 9.1* 8.0* 7.8*   HEMATOCRIT 24.4*  --  23.0* 22.3*   PLATELETCT 235  --  202 158*   PROTHROMBTM 18.7*  --  19.4* 19.9*   INR 1.61*  --  1.69* 1.75*       Imaging  X-Ray:  I have personally reviewed the images and compared with prior  images.    Assessment/Plan  * GIB (gastrointestinal bleeding)- (present on admission)  Assessment & Plan  [place] and erosive esophagitis  Serial CBC with conservative transfusion strategy    No varices, octreotide did not help HRS, will DC  IV PPI twice daily  No ascites, ok to DC rocephin  Keep n.p.o.    Large bore IV access, has trialysis line now as well    Acute liver failure with hepatic coma (HCC)  Assessment & Plan  This A/P is for transplant evaluation:    Due to alcohol  Last drink ~ 3 weeks prior to admission  Until then drinking ~ 1 pint vodka daily    2/24:  - Meld: 39 today (40 on admission)  - He is now on daily dialysis  - Maddrey 60 today (69 on admission)    Now coagulopathic, diffuse oropharyngeal bleeding - mouth packed with TXA gauze  Not following commands when sedation is lifted - now RASS -5 with paralysis to tolerate gauze    ON 2/24 I began reaching out to liver transplant centers to see if he is a candidate for transfer / evaluation  Limiting factors will be weight of 436lbs, and recent alcohol use  Positive factors are a recent demonstration of willingness to stop drinking, strong family support    Afternoon 2/24:  I had the opportunity to speak with transplant at [facility].  They do have an accelerated pathway for transplant evaluation in patients who have alcoholic liver disease.  However it requires the patient to be awake, alert and interactive with transplant psychologists for the proper evaluation.  Given the fact that he had worsening obtundation due to liver failure and is now intubated, sedated and paralyzed to assist oropharyngeal packing, he is not a candidate for transfer at this time.    If we are able to see some improvement, get him extubated and have him able to interact he would be a candidate for evaluation if he still requires it and they have capacity.    We will now terminate the search for transplant center transfer as he will not be a candidate in his current  state and has already been rejected from several centers.  If he improves, is extubated and is interactive and still has severe liver disease will recommend reaching back out to [facility] for consideration of transplant evaluation.    Goals of care, counseling/discussion  Assessment & Plan  I have updated his mom and former spouse at the bedside.  We discussed his continued worsening despite all efforts.  I explained that he was now deeply sedated and paralyzed in order to tolerate the oropharyngeal packing in efforts to tamponade his bleeding.  We also discussed that his bilirubin is worsening and the diffuse bleeding that we are seeing is likely due to his liver failing.  We discussed hepatic encephalopathy and that prior to intubation he was significantly worse than even on arrival.    They describe a recent history where he would try to treat his depression with alcohol.  He had determined to quit drinking and was trying cleanses at home and had refused to come in for medical evaluation despite their encouragement.  They describe him as a loving but stubborn man.  He is well loved by his family and his children and they request that we continue to do everything possible for him.  I assured them that we would and we would not give up on him.    I did discuss end-of-life matters with them.  I told them that with his liver disease, respiratory and renal failure and now bleeding profusely from his oropharynx if his heart were to stop the utility of CPR would not lead to a meaningful outcome.  I recommended that we do continue to do everything we possibly can to save his life but that if his heart were to stop on its own that we allow him to die naturally and not do CPR.  They agreed to this course so we will make him DNR/I OKAY.    Palliative care has spoken to his family and they and I are greatly appreciative of their help in this difficult time.    Rejected from transplant centers - [facility] is willing  to revist the issue if he is extubated and able to participate in conversations    Oropharyngeal bleeding  Assessment & Plan  Removed packing today  Oropharynx looks very well but there is active bleeding from somewhere proximal to his uvula in the nasopharynx    I repacked his mouth with TXA soaked gauze  We will discuss with ENT if he would be candidate for an NP scope and any intervention    TEG is looking better today  We will continue to correct coagulopathy as it presents    Will discontinue paralysis once packing is able to be removed from his mouth    Hepatic encephalopathy (HCC)  Assessment & Plan  Begin lactulose and rifaximin  Close neurologic monitoring    Intubated and sedated now    Paralyzed to tolerate oral pharyngeal Kerlix packing for bleeding    Flexi-Seal in place with regular output    Acute renal failure with tubular necrosis (HCC)  Assessment & Plan  Urine output very low  Likely HRS unfortunately    Now on daily dialysis - nephrology to re-evaluate needs daily  FiO2 improving    Continuing daily dialysis    Alcohol abuse  Assessment & Plan  Significant alcohol abuse with most recent consumption reported 3 weeks ago  Monitor for alcohol withdrawal syndrome using RASS with as needed benzodiazepines  Vitamin supplementation  Alcohol cessation education and resources to be provided    Decompensated hepatic cirrhosis (HCC)  Assessment & Plan  Alcoholic cirrhosis decompensated with associated hepatic encephalopathy, metabolic acidosis, acute kidney injury, GI bleed  On arrival: Meld score 40, Madrey 69  Monitor synthetic function  Avoid hepatotoxins  Palliative care consultation, poor prognosis unfortunately    Bili is improving somewhat, will continue prednisolone for full course:  - 40mg x28 days (until 3/22/22 then begin taper which is scheduled to end on 4/11/22)  - I put in a taper for the next 3 weeks as follows: 30mg x5 days, 20mg x5 days, 10mg x5 days, 5mg x5day      Hypotension due to  hypovolemia  Assessment & Plan  Now on norepinephrine  Midodrine  Albumin 1g/kg x2 days for potential HRS - Complete    Trend Hgb and transfuse >7    NE requirements now improving  Add vasopressin if required    Repeating TEG & Fibrinogen as needed for coagulopathy management    Class 3 severe obesity due to excess calories without serious comorbidity in adult (HCC)  Assessment & Plan  Encourage behavioral and dietary modification for weight loss  RD consultation    Advancing tube feeds    Acute blood loss anemia  Assessment & Plan  Secondary to gastrointestinal hemorrhage  Now diffuse oropharyngeal bleeding    Packed oropharynx with TXA soaked gauze  TEG & Fibrinogen sent    Serial CBC with conservative transfusion strategy targeting a hemoglobin goal greater than 7    On mechanically assisted ventilation (HCC)  Assessment & Plan  Intubated date: 2/22/22  Goal saturation > 88%  Monitor ventilator waveforms and titrate flow/peep and volumes according.   Lung protective ventilation strategy  CXR PRN: monitor lung volumes and tube/line placement  VAP bundle prevention, oral care, post pyloric feeding  Head of bed > 30 degree  GI prophylaxis: PPI BID  Daily awakening and SBT trials unless contraindicated  Assess / Treat pain  Assess / Treat delirium  Sedation Goal: RASS 0 to -1  Monitor for liberation / early mobility  Respiratory treatments: prn  ABCDEF Bundle     Oxygen requirements improved today  Continuing daily dialysis  Now on PEEP 16 FiO2 60%    Leukemoid reaction  Assessment & Plan  Likely reactive at this time  Trend  Has been stable       VTE:  Contraindicated  Ulcer: PPI BID for GI Bleed  Lines: Central Line  Ongoing indication addressed and Fernández Catheter  Ongoing indication addressed    I have performed a physical exam and reviewed and updated ROS and Plan today (2/25/2022). In review of yesterday's note (2/24/2022), there are no changes except as documented above.     Discussed patient condition and risk  of morbidity and/or mortality with Family, RN, RT, Pharmacy, Code status disscussed, Charge nurse / hot rounds, Patient, GI and nephrology and Transfer Center (looking for transplant centers)     The patient remains critically ill.  He is intubated and on mechanical ventilation.  Critical care time = 89 minutes in directly providing and coordinating critical care and extensive data review.  No time overlap and excludes procedures.

## 2022-02-25 NOTE — CARE PLAN
The patient is Unstable - High likelihood or risk of patient condition declining or worsening    Shift Goals  Clinical Goals: hemodynamic stability, keep well sedated, decrease oral bleeding  Patient Goals: unable to asses  Family Goals: stable vitals, comfort    Progress made toward(s) clinical / shift goals:  Patients blood pressure has been controlled and stayed within goal with use of vasopressors, patient has sustained a RASS of -5 which is within the goal, oral bleeding has slowed since packing the mouth with txa soaked gauze.      Problem: Pain - Standard  Goal: Alleviation of pain or a reduction in pain to the patient’s comfort goal  Outcome: Progressing     Problem: Knowledge Deficit - Standard  Goal: Patient and family/care givers will demonstrate understanding of plan of care, disease process/condition, diagnostic tests and medications  Outcome: Progressing     Problem: Skin Integrity  Goal: Skin integrity is maintained or improved  Outcome: Progressing     Problem: Fall Risk  Goal: Patient will remain free from falls  Outcome: Progressing     Problem: Safety - Medical Restraint  Goal: Remains free of injury from restraints (Restraint for Interference with Medical Device)  Outcome: Progressing  Goal: Free from restraint(s) (Restraint for Interference with Medical Device)  Outcome: Progressing       Patient is not progressing towards the following goals:

## 2022-02-25 NOTE — PROGRESS NOTES
This morning patient had increased copious bloody secretions. Suctioned out golf ball sized clots from the mouth and bloody secretions from the ETT. Notified MD during morning rounds.    1015 MD present at bedside. Attained RASS score of -5 before giving paralytics. Performed a bronchoscopy to clear out secretions. MD packed txa soaked gauze into the oral cavity. Mother present at bedside post-procedure and informed of plan of care and procedures preformed, MD answered all questions.

## 2022-02-25 NOTE — CARE PLAN
Problem: Ventilation  Goal: Ability to achieve and maintain unassisted ventilation or tolerate decreased levels of ventilator support  Description: Target End Date:  4 days     Document on Vent flowsheet    1.  Support and monitor invasive and noninvasive mechanical ventilation  2.  Monitor ventilator weaning response  3.  Perform ventilator associated pneumonia prevention interventions  4.  Manage ventilation therapy by monitoring diagnostic test results  Outcome: Not Progressing      Ventilator Daily Summary    Vent Day # 3    Ventilator settings changed this shift: 22 430 +16 90%    Weaning trials: none due to peep/fio2    Respiratory Procedures: therapeutic bronchoscopy      Plan: Continue current ventilator settings and wean mechanical ventilation as tolerated per physician orders.

## 2022-02-25 NOTE — CARE PLAN
Problem: Ventilation  Goal: Ability to achieve and maintain unassisted ventilation or tolerate decreased levels of ventilator support  Description: Target End Date:  4 days     Document on Vent flowsheet    1.  Support and monitor invasive and noninvasive mechanical ventilation  2.  Monitor ventilator weaning response  3.  Perform ventilator associated pneumonia prevention interventions  4.  Manage ventilation therapy by monitoring diagnostic test results  Outcome: Not Progressing                                   Ventilator Daily Summary     Vent Day # 4     Ventilator settings changed this shift: 22 430 +16 80%     Weaning trials: none due to peep/fio2     Respiratory Procedures: therapeutic bronchoscopy       Plan: Continue current ventilator settings and wean mechanical ventilation as tolerated per physician orders.

## 2022-02-26 NOTE — PROGRESS NOTES
"Critical Care Progress Note    Date of admission  2/21/2022    Chief Complaint  \"37 y.o. male who presented 2/21/2022 with a past medical history significant for alcohol abuse for the last 4+ years worsening over the last several months after he lost his job as well as obesity and hypertension who has not seen a physician or take any medications for many years.  He is brought in by EMS at the request of his family for worsening confusion, drowsiness, and 3 days of hematemesis with associated melena.  His 15-year-old son who is at bedside and lives with his father, reports that patient has been vomiting approximately 2-3 times per day a moderate amount of blood and is having 1-2 melanotic stools per day worsening over the last 24 hours.  Patient has been refusing to seek medical attention.  The patient reports stopping alcohol approximately 3 weeks ago and normally drinks a pint of vodka per day.  He does not take any nonsteroidal anti-inflammatory drugs nor use any anticoagulation medication.  He denies any prior history of gastrointestinal hemorrhage nor has he been evaluated by a gastroenterologist for liver disease in the past.  He states that he has had decreased urine output over the last 24 hours.  EMS found him with significant blood around him (1 L of bright red blood) and borderline hypotension.  He was evaluated by the emergency physician as well as hospitalist and found to be in multiorgan failure in the setting of GI bleed and requested a consultation for ICU admission and critical care management.  GI was not consulted in the ED as the emergency physicians have been requested to \"not consult to gastroenterology in the middle the night unless an emergent procedure needs to be done.\" - Dr Gonda 2/21/22    Hospital Course  2/21 - admitted overnight to ICU  2/22 - Intubated for HE and need for EGD, dialysis line placed, Nephro/GI consulted.  Vent Day 1  2/23 - Vent Day 2, Dialysis yesterday and planned for " today.  Palliative.  2/24 - VD 3, oropharynx packed with gauze, sedated and paralyzed to tolerate packing.  Began searching for transplant center  2/25 - VD 4, rejected from transplant centers (see plan of care note on 2/24), discussed with ENT utility of NP scope to look for source of NP bleeding  2/26 - Nasopharynx packed by ENT yesterday, ~stable labs.  Some improvement in oxygenation.  Worsening vasopressors today      Interval Problem Update  Reviewed last 24 hour events:  Packing removed from oropharynx yesterday  ENT packed nose yesterday afternoon  Discontinued paralytic  Liberate RASS goal  WBC up to 34 from 23 today  Hemoglobin stable  Platelets stable  Continuing daily dialysis  Bilirubin 22 down from 24 yesterday  AST/ALT are starting to increase  Blood cultures sent x2 today      Review of Systems  Review of Systems   Unable to perform ROS: Intubated        Vital Signs for last 24 hours   Temp:  [36.3 °C (97.3 °F)] 36.3 °C (97.3 °F)  Pulse:  [61-73] 70  Resp:  [19-56] 22  BP: ()/(32-52) 94/44  SpO2:  [90 %-97 %] 91 %    Hemodynamic parameters for last 24 hours       Respiratory Information for the last 24 hours  Vent Mode: APVCMV  Rate (breaths/min): 24  Vt Target (mL): 430  PEEP/CPAP: 16  MAP: 22  Control VTE (exp VT): 456    Physical Exam   Physical Exam  Vitals and nursing note reviewed.   Constitutional:       Appearance: He is morbidly obese. He is ill-appearing and toxic-appearing.      Interventions: He is sedated and intubated.   HENT:      Head: Normocephalic.      Nose:      Comments: Now with packing     Mouth/Throat:      Comments: Packing removed from oropharynx  Nasopharynx now packed  Eyes:      General: Scleral icterus present.   Cardiovascular:      Rate and Rhythm: Normal rate and regular rhythm.      Pulses: Normal pulses.   Pulmonary:      Effort: No respiratory distress. He is intubated.      Comments: Diminished breath sounds  Abdominal:      General: Abdomen is protuberant.  There is no distension.      Tenderness: There is no abdominal tenderness. There is no guarding or rebound.   Musculoskeletal:         General: No deformity.      Cervical back: Normal range of motion. No rigidity.   Skin:     General: Skin is warm and dry.      Capillary Refill: Capillary refill takes less than 2 seconds.      Coloration: Skin is jaundiced.   Neurological:      Comments: RASS -5         Medications  Current Facility-Administered Medications   Medication Dose Route Frequency Provider Last Rate Last Admin   • Tranexamic Acid (CYKLOKAPRON) injection 1,000 mg  1,000 mg Mouth/Throat Once Cody Orellana M.D.       • oxymetazoline (AFRIN) 0.05 % nasal spray 2 Spray  2 Spray Nasal BID Brian Goldstein M.D.   2 Spray at 02/26/22 0546   • prednisoLONE (PRELONE) 15 MG/5ML syrup 39.9 mg  39.9 mg Enteral Tube DAILY Cody Orellana M.D.   39.9 mg at 02/26/22 0547    Followed by   • [START ON 3/24/2022] prednisoLONE (PRELONE) 15 MG/5ML syrup 30 mg  30 mg Enteral Tube DAILY Cody Orellana M.D.        Followed by   • [START ON 3/29/2022] prednisoLONE (PRELONE) 15 MG/5ML syrup 20.1 mg  20.1 mg Enteral Tube DAILY Cody Orellana M.D.        Followed by   • [START ON 4/3/2022] prednisoLONE (PRELONE) 15 MG/5ML syrup 9.9 mg  9.9 mg Enteral Tube DAILY Cody Orellana M.D.        Followed by   • [START ON 4/8/2022] prednisoLONE (PRELONE) 15 MG/5ML syrup 5.1 mg  5.1 mg Enteral Tube DAILY Cody Orellana M.D.       • ceFAZolin (Ancef) IV syringe 2 g  2 g Intravenous Q8HRS Cody Orellana M.D.   2 g at 02/26/22 1331   • artificial tears (EYE LUBRICANT) ophth ointment 1 Application  1 Application Both Eyes Q8HRS Cody Orellana M.D.   1 Application at 02/26/22 1331   • propofol (DIPRIVAN) injection  0-80 mcg/kg/min Intravenous Continuous Cody Orellana M.D. 77.2 mL/hr at 02/26/22 1331 65 mcg/kg/min at 02/26/22 1331   • fentaNYL (SUBLIMAZE) 50 mcg/mL in 50mL (Continuous Infusion)   Intravenous Continuous Cody Orellana M.D. 2 mL/hr  at 02/26/22 0049 100 mcg/hr at 02/26/22 0049   • rocuronium (ZEMURON) injection 100 mg  100 mg Intravenous Q2HRS PRN Cody Orellana M.D.       • vasopressin (VASOSTRICT) 20 Units in  mL Infusion  0.03 Units/min Intravenous Continuous Cody Orellana M.D.   Dose not Required at 02/24/22 1000   • lactulose 20 GM/30ML solution 30 mL  30 mL Enteral Tube Q4HRS Cody Orellana M.D.   30 mL at 02/26/22 1331   • nystatin (MYCOSTATIN) powder   Topical TID Jeremy M Gonda, M.D.   Given at 02/26/22 1153   • norepinephrine (Levophed) 8 mg in 250 mL NS infusion (premix)  0-30 mcg/min Intravenous Continuous Cody Orellana M.D. 46.9 mL/hr at 02/26/22 1302 25 mcg/min at 02/26/22 1302   • lidocaine (XYLOCAINE) 1 % injection 2 mL  2 mL Tracheal Tube Q30 MIN PRN Cody Orellana M.D.       • Pharmacy Consult: Enteral tube insertion - review meds/change route/product selection  1 Each Other PHARMACY TO DOSE Cody Orellana M.D.       • [Held by provider] midodrine (PROAMATINE) tablet 10 mg  10 mg Enteral Tube TID WITH MEALS Cody Orellana M.D.   10 mg at 02/25/22 1726   • riFAXIMin (XIFAXAN) tablet 550 mg  550 mg Enteral Tube BID Cody Orellana M.D.   550 mg at 02/26/22 0546   • heparin injection 2,400 Units  2,400 Units Intracatheter PRN Caterina Garrett M.D.   2,400 Units at 02/26/22 1204   • pantoprazole (Protonix) injection 40 mg  40 mg Intravenous BID Jeremy M Gonda, M.D.   40 mg at 02/26/22 0546   • ondansetron (ZOFRAN) syringe/vial injection 4 mg  4 mg Intravenous Q4HRS PRN Jeremy M Gonda, M.D.           Fluids    Intake/Output Summary (Last 24 hours) at 2/26/2022 1345  Last data filed at 2/26/2022 1202  Gross per 24 hour   Intake 4880.4 ml   Output 8407 ml   Net -3526.6 ml       Laboratory  Recent Labs     02/24/22  0406 02/25/22  0411   ISTATAPH 7.334* 7.314*   ISTATAPCO2 45.7* 49.7*   ISTATAPO2 70 54*   ISTATATCO2 26 27   GIBXIOW2UNF 92* 85*   ISTATARTHCO3 24.3 25.3*   ISTATARTBE -2 -1   ISTATTEMP 98.2 F 97.0 F   ISTATFIO2 80 80    ISTATSPEC Arterial Arterial   ISTATAPHTC 7.337* 7.327*   ILTPUPXC6BN 69 51*         Recent Labs     02/24/22  0538 02/25/22  0555 02/26/22  0456   SODIUM 136 134* 134*   POTASSIUM 4.0 4.1 3.6   CHLORIDE 100 98 94*   CO2 21 21 23   BUN 74* 61* 57*   CREATININE 5.69* 4.28* 5.35*   MAGNESIUM 2.7* 2.5 2.4   PHOSPHORUS 7.9* 7.4* 7.2*   CALCIUM 8.8 8.8 8.8     Recent Labs     02/24/22  0538 02/25/22  0555 02/26/22  0456   ALTSGPT 75* 70* 97*   ASTSGOT 153* 152* 229*   ALKPHOSPHAT 161* 148* 161*   TBILIRUBIN 31.2* 24.4* 22.2*   PREALBUMIN  --  8.7*  --    GLUCOSE 135* 120* 114*     Recent Labs     02/24/22  0538 02/25/22  0555 02/26/22  0456   WBC 23.9* 23.2* 34.1*   NEUTSPOLYS 88.50* 82.10* 82.70*   LYMPHOCYTES 4.40* 7.20* 6.40*   MONOCYTES 7.10 9.20 8.20   EOSINOPHILS 0.00 0.10 0.00   BASOPHILS 0.00 0.20 0.00   ASTSGOT 153* 152* 229*   ALTSGPT 75* 70* 97*   ALKPHOSPHAT 161* 148* 161*   TBILIRUBIN 31.2* 24.4* 22.2*     Recent Labs     02/24/22 0538 02/25/22  0555 02/26/22  0456   RBC 2.34* 2.20* 2.29*   HEMOGLOBIN 8.0* 7.8* 8.0*   HEMATOCRIT 23.0* 22.3* 23.0*   PLATELETCT 202 158* 197   PROTHROMBTM 19.4* 19.9* 20.2*   INR 1.69* 1.75* 1.78*       Imaging  X-Ray:  I have personally reviewed the images and compared with prior images. and My impression is: no change, low volumes    Assessment/Plan  * GIB (gastrointestinal bleeding)- (present on admission)  Assessment & Plan  Blanca Villa and erosive esophagitis  Serial CBC with conservative transfusion strategy    No varices, octreotide did not help HRS, will DC  IV PPI twice daily  No ascites, ok to DC rocephin  Keep n.p.o.    Large bore IV access, has trialysis line now as well    Acute liver failure with hepatic coma (HCC)  Assessment & Plan  This A/P is for transplant evaluation:    Due to alcohol  Last drink ~ 3 weeks prior to admission  Until then drinking ~ 1 pint vodka daily    2/24:  - Meld: 39 today (40 on admission)  - He is now on daily dialysis  - Odin Leonardo  today (69 on admission)    Now coagulopathic, diffuse oropharyngeal bleeding - mouth packed with TXA gauze  Not following commands when sedation is lifted - now RASS -5 with paralysis to tolerate gauze    ON 2/24 I began reaching out to liver transplant centers to see if he is a candidate for transfer / evaluation  Limiting factors will be weight of 436lbs, and recent alcohol use  Positive factors are a recent demonstration of willingness to stop drinking, strong family support    Afternoon 2/24:  I had the opportunity to speak with transplant at [facility].  They do have an accelerated pathway for transplant evaluation in patients who have alcoholic liver disease.  However it requires the patient to be awake, alert and interactive with transplant psychologists for the proper evaluation.  Given the fact that he had worsening obtundation due to liver failure and is now intubated, sedated and paralyzed to assist oropharyngeal packing, he is not a candidate for transfer at this time.    If we are able to see some improvement, get him extubated and have him able to interact he would be a candidate for evaluation if he still requires it and they have capacity.    We will now terminate the search for transplant center transfer as he will not be a candidate in his current state and has already been rejected from several centers.  If he improves, is extubated and is interactive and still has severe liver disease will recommend reaching back out to [facility] hepatology for consideration of transplant evaluation.    Goals of care, counseling/discussion  Assessment & Plan  I have updated his mom and former spouse at the bedside.  We discussed his continued worsening despite all efforts.  I explained that he was now deeply sedated and paralyzed in order to tolerate the oropharyngeal packing in efforts to tamponade his bleeding.  We also discussed that his bilirubin is worsening and the diffuse bleeding that we are seeing is likely  due to his liver failing.  We discussed hepatic encephalopathy and that prior to intubation he was significantly worse than even on arrival.    They describe a recent history where he would try to treat his depression with alcohol.  He had determined to quit drinking and was trying cleanses at home and had refused to come in for medical evaluation despite their encouragement.  They describe him as a loving but stubborn man.  He is well loved by his family and his children and they request that we continue to do everything possible for him.  I assured them that we would and we would not give up on him.    I did discuss end-of-life matters with them.  I told them that with his liver disease, respiratory and renal failure and now bleeding profusely from his oropharynx if his heart were to stop the utility of CPR would not lead to a meaningful outcome.  I recommended that we do continue to do everything we possibly can to save his life but that if his heart were to stop on its own that we allow him to die naturally and not do CPR.  They agreed to this course so we will make him DNR/I OKAY.    Palliative care has spoken to his family and they and I are greatly appreciative of their help in this difficult time.    Rejected from transplant centers - [facility] is willing to revist the issue if he is extubated and able to participate in conversations    Oropharyngeal bleeding  Assessment & Plan  Oropharynx with some continued bleeding today - will repack with gauze  Nasopharynx packed 2/25 by ENT - Keep in for 4 days    Daily TEG  We will continue to correct coagulopathy as it presents    Hepatic encephalopathy (HCC)  Assessment & Plan  Begin lactulose and rifaximin  Close neurologic monitoring    Intubated and sedated now    Flexi-Seal in place with regular output    Acute renal failure with tubular necrosis (HCC)  Assessment & Plan  Urine output very low  Likely HRS unfortunately    Now on daily dialysis - nephrology to  re-evaluate needs daily  FiO2 improving    Continuing daily dialysis    Alcohol abuse  Assessment & Plan  Significant alcohol abuse with most recent consumption reported 3 weeks ago  Monitor for alcohol withdrawal syndrome using RASS with as needed benzodiazepines  Vitamin supplementation  Alcohol cessation education and resources to be provided    Decompensated hepatic cirrhosis (HCC)  Assessment & Plan  Alcoholic cirrhosis decompensated with associated hepatic encephalopathy, metabolic acidosis, acute kidney injury, GI bleed  On arrival: Meld score 40, Madrey 69  Monitor synthetic function  Avoid hepatotoxins  Palliative care consultation, poor prognosis unfortunately    Bili is improving somewhat, will continue prednisolone for full course:  - 40mg x28 days (until 3/22/22 then begin taper which is scheduled to end on 4/11/22)  - I put in a taper for the next 3 weeks as follows: 30mg x5 days, 20mg x5 days, 10mg x5 days, 5mg x5day      Hypotension due to hypovolemia  Assessment & Plan  Now on norepinephrine  Midodrine  Albumin 1g/kg x2 days for potential HRS - Complete    Trend Hgb and transfuse >7    NE requirements now improving  Add vasopressin if required    Repeating TEG & Fibrinogen as needed for coagulopathy management    Class 3 severe obesity due to excess calories without serious comorbidity in adult (HCC)  Assessment & Plan  Encourage behavioral and dietary modification for weight loss  RD consultation    Advancing tube feeds    Acute blood loss anemia  Assessment & Plan  Secondary to gastrointestinal hemorrhage  Now diffuse oropharyngeal bleeding    Packed oropharynx with TXA soaked gauze  TEG & Fibrinogen sent    Serial CBC with conservative transfusion strategy targeting a hemoglobin goal greater than 7    On mechanically assisted ventilation ([facility])  Assessment & Plan  Intubated date: 2/22/22  Goal saturation > 88%  Monitor ventilator waveforms and titrate flow/peep and volumes according.   Lung  protective ventilation strategy  CXR PRN: monitor lung volumes and tube/line placement  VAP bundle prevention, oral care, post pyloric feeding  Head of bed > 30 degree  GI prophylaxis: PPI BID  Daily awakening and SBT trials unless contraindicated  Assess / Treat pain  Assess / Treat delirium  Sedation Goal: RASS 0 to -1  Monitor for liberation / early mobility  Respiratory treatments: prn  ABCDEF Bundle     Oxygen requirements improved today  Continuing daily dialysis  Now on PEEP 16 FiO2 50%    Leukemoid reaction  Assessment & Plan  Was stable for several days  Now increasing    Blood cultures x2  Continue antibiotics       VTE:  Contraindicated  Ulcer: PPI BID for GI Bleed  Lines: Central Line  Ongoing indication addressed and Fernández Catheter  Ongoing indication addressed    I have performed a physical exam and reviewed and updated ROS and Plan today (2/26/2022). In review of yesterday's note (2/25/2022), there are no changes except as documented above.     Discussed patient condition and risk of morbidity and/or mortality with Family, RN, RT, Pharmacy, Code status disscussed, Charge nurse / hot rounds, Patient, GI and nephrology and Transfer Center (looking for transplant centers)     The patient remains critically ill.  He is intubated and on mechanical ventilation.  Critical care time = 49 minutes in directly providing and coordinating critical care and extensive data review.  No time overlap and excludes procedures.

## 2022-02-26 NOTE — PROGRESS NOTES
S: Has had no bleeding form nose but continues to have some from mouth, but little since 8:00.  Hgb up slightly.     O: BP (!) 94/44   Pulse 70   Temp 36.3 °C (97.3 °F) (Bladder)   Resp (!) 22   Ht 1.829 m (6')   Wt (!) 202 kg (445 lb 8.8 oz)   SpO2 91%   Recent Labs     02/24/22  0538 02/25/22  0555 02/26/22  0456   WBC 23.9* 23.2* 34.1*   RBC 2.34* 2.20* 2.29*   HEMOGLOBIN 8.0* 7.8* 8.0*   HEMATOCRIT 23.0* 22.3* 23.0*   MCV 98.3* 101.4* 100.4*   MCH 34.2* 35.5* 34.9*   MCHC 34.8 35.0 34.8   RDW 72.2* 76.4* 72.6*   PLATELETCT 202 158* 197   MPV 11.1 11.3 11.5     Recent Labs     02/24/22  0538 02/25/22  0555 02/26/22  0456   SODIUM 136 134* 134*   POTASSIUM 4.0 4.1 3.6   CHLORIDE 100 98 94*   CO2 21 21 23   GLUCOSE 135* 120* 114*   BUN 74* 61* 57*   CREATININE 5.69* 4.28* 5.35*   CALCIUM 8.8 8.8 8.8     I/O last 3 completed shifts:  In: 5860.7 [I.V.:4210.7; Other:300; NG/GT:380; Dialysis:500]  Out: 5117 [Urine:17; Dialysis:4000]  Nasal packing in place-minimal discoloring  Some clot in OP.    A: Liver Failure, Renal failure, coagulopathy    P: Bleeding has diminished, hgb actually up some today.  Plus minus on pharyngeal pack.

## 2022-02-26 NOTE — CARE PLAN
Problem: Pain - Standard  Goal: Alleviation of pain or a reduction in pain to the patient’s comfort goal  Outcome: Progressing     Problem: Knowledge Deficit - Standard  Goal: Patient and family/care givers will demonstrate understanding of plan of care, disease process/condition, diagnostic tests and medications  Outcome: Progressing  Note: Family updated on plan of care   The patient is Watcher - Medium risk of patient condition declining or worsening    Shift Goals  Clinical Goals: hemodynamic stability  Patient Goals: unable to assess  Family Goals: not present at this time

## 2022-02-26 NOTE — PROGRESS NOTES
[facility] Progress Note     HD treatment ordered by Dr Garrett x 3 hours.  Treatment Start time: 0900          End time: 1201       Net UF 3000 ml    Patient tolerated treatment with BSP 80-90's, with pressor adjusted.    All blood was returned. CVC RIJ locked with heparin (Red limb-1.2 ml; Blue limb-1.2 ml). CVC Dressing and end cap changed.  See flow sheet for details.     Report given to FABIOLA Scott RN.

## 2022-02-26 NOTE — PROGRESS NOTES
[facility] -  PROGRESS NOTE               Author: Caterina Garrett M.D. Date & Time: 2/26/2022  2:14 PM     HPI:  37 year old male with medical H/o Alcohol abuse for the last four years, Obesity and Hypertension is admitted on 02/21 with Acute Upper GI bleed and decompensated cirrhosis. He has been having hematemesis and melena for the last one day and he has not seeked medical attention.  As per notes, patient drinks one pint of vodka daily. No H/o of NSAID's or other anticoagulation. He was admitted to ICU for further management. This morning, patient was intubated as he was desaturating and encephalopathy and also with the need for endoscopy.     He was started on PPI, octreotide, Midodrine and vasopresors for blood support. Labs showed Hb of 9.8, BUN/creatinine of 99/6.28. Ultrasound of abdomen showed no hydronephrosis. Ammonia is elevated at 151. Chest x ray showed pulmonary infiltrates        DAILY NEPHROLOGY SUMMARY:  02/22: Dialysis catheter placed and underwent 2 hours of dialysis.  Endoscopy was done which showed Blanca-Villa tear and erosive esophagitis  02/23: On pressors, blood pressure still soft and patient looks grossly swollen.  Receiving the second day of dialysis today.   02/24: Patient is still on pressors and is having continued bleeding. Kidney function looks little better  02/25: Patient still remains intubated and on pressors.  Patient is DNR now after family discussion.   2/26: s/p HD today, on levo , anuric, intubated         ROS  Unable to obtain as the patient is intubated.     PMH/PSH/SH/FH:   Reviewed and unchanged since admission note    CURRENT MEDICATIONS:   Reviewed from admission to present day    VS:  BP (!) 94/44   Pulse 70   Temp 36.3 °C (97.3 °F) (Bladder)   Resp (!) 22   Ht 1.829 m (6')   Wt (!) 202 kg (445 lb 8.8 oz)   SpO2 91%   BMI 60.43 kg/m²     Physical Exam  Constitutional:       Appearance: He is obese.      Comments: Intubated   HENT:       Head:      Comments: Gross blood noted around the nostrils     Mouth/Throat:      Mouth: Mucous membranes are dry.   Eyes:      Extraocular Movements: Extraocular movements intact.      Pupils: Pupils are equal, round, and reactive to light.   Cardiovascular:      Rate and Rhythm: Normal rate and regular rhythm.      Pulses: Normal pulses.      Heart sounds: Normal heart sounds.   Pulmonary:      Effort: Pulmonary effort is normal.      Breath sounds: Normal breath sounds.   Abdominal:      General: Bowel sounds are normal.      Palpations: Abdomen is soft.   Musculoskeletal:         General: Swelling present. Normal range of motion.      Cervical back: Normal range of motion.   Neurological:      Comments: Unable to assess completely   Fluids:  In: 4343.5 [I.V.:2693.5; Other:300; Dialysis:500; Enteral:330]  Out: 4907     LABS:  Recent Labs     02/24/22  0538 02/25/22  0555 02/26/22  0456   SODIUM 136 134* 134*   POTASSIUM 4.0 4.1 3.6   CHLORIDE 100 98 94*   CO2 21 21 23   GLUCOSE 135* 120* 114*   BUN 74* 61* 57*   CREATININE 5.69* 4.28* 5.35*   CALCIUM 8.8 8.8 8.8       IMAGING:   All imaging reviewed from admission to present day    IMPRESSION:  # anuric CHI  - Likely 2/2 ATN from hemodynamic instability  - no hydro   # h/o HTN but now in Shock 2/2 hypovolemia vs advanced liver cirrhosis   - BP meds on hold   # Acute Upper GI bleeding   - EGD: Blanca Villa and erosive esophagitis  # Decompensated ETOH hepatic cirrhosis  # Acute anemia of bleeding  - no indication for [place]   # Coagulopathy with bleeding from diffuse oropharyngeal bleeding   - mouth packed with TXA gauze   - Urethral bleeding minimal due to anuria   - m2/2 cirrhosis +/- DIC   # Hepatic encephalopathy  - intubated   # Acute respiratory failure 2/2 pulmonary edema  # Leukocytosis, per primary   # Acidosis 2/2 renal failure, controlled with HD  # Hypoalbuminemia 2/2 liver cirrhosis  # Hyperphosphatemia      PLAN:  - HD today and then tomorrow , UF  as tolerated with pressor support   - daily eval for HD need   - pressor management per primary team   - Strict I/Os  - Dose all meds per eGFR < 10  - will need phos binder once on Oral feeds otherwise HD will help a bit with Phos management.   - transfuse to keep Hbg >7   - c/w GOC discussion with family  - prognosis remains poor.      Thank you.

## 2022-02-26 NOTE — CARE PLAN
Ventilator Daily Summary    Vent Day #5    Ventilator settings: 24, 430, +8, 50%    Plan: Continue current ventilator settings and wean mechanical ventilation as tolerated per physician orders.

## 2022-02-26 NOTE — CARE PLAN
The patient is Watcher - Medium risk of patient condition declining or worsening    Shift Goals  Clinical Goals: Wean levo, hemodynamic stability  Patient Goals: KADEN  Family Goals: Patient comfort, updates on changes    Progress made toward(s) clinical / shift goals: Decreased levophed dosing over the shift, patient remained hemodynamically stable, and educated family on plan of care.

## 2022-02-26 NOTE — CONSULTS
DATE OF SERVICE:  02/25/2022     HISTORY OF PRESENT ILLNESS:  The patient is a 37-year-old with a history of   liver failure and renal failure, admitted to the hospital on 02/21/2022 for a   GI bleed.  He was bleeding from his mouth and gums as well.  Most of the   patient's bleeding has been stopped and his coagulopathy improved.  He is   intubated.  He has continued to ooze from his mouth, primarily.  He does   obviously have the endotracheal tube in place and a right-sided feeding tube.    He has been given TXA both intravenously and topically and packed from the   mouth into the nasopharynx as it is thought that perhaps the bleeding is   coming from the nasopharynx.  I was asked to see the patient for persistent   upper airway bleeding.     PREVIOUS MEDICAL HISTORY:  1.  Obesity .  2.  Hypertension.  3.  Liver failure.  4.  Renal failure.  5.  Alcohol abuse.     PREVIOUS SURGICAL HISTORY:  None on file.     SOCIAL HISTORY:  The patient has had heavy alcohol use and apparently some   drug use.  Tobacco use, the patient does not use tobacco apparently. The   remainder of the history is taken from the previous records as the patient is   intubated.     HABITS:  Tobacco:  Denies abuse.  Recreational drugs: Marijuana frequently.    ETOH: 1 pint a day of alcohol.     HOME MEDICATIONS:  None.     ALLERGIES:  NKDA.     PHYSICAL EXAMINATION:  GENERAL:  Extremely large 37-year-old male, intubated with no active bleeding.  HEENT:  The mouth, oropharynx and nasopharynx had been packed with   TXA-soaked-soaked gauze.  There is some slight oozing from the patient's   nostrils.     LABORATORY DATA:  Reveals a white count of 23.2.  Platelet count is 158.  MCV   101.4.  PT is 19.9 with an INR of 1.75.  Fibrinogen is 200.  TEG panel is   within normal limits.     IMPRESSION AND PLAN:  1.  I suspect the patient is bleeding somewhere from his nose into his   nasopharynx.  Plan at the bedside clot was evacuated from both sides of  the   nose and the oral cavity.  This was tedious.  The packing was removed from the   mouth. Prior to getting all the clot out, there was not any active bleeding.    As I continued to remove clot; however, bleeding once again started.  It was   dark reddish and did not appear to be arterial.  The nasal cavity was examined   endoscopically and I could never get a perfect view of the nasopharynx as   either clots or new blood begin to accumulate.  It was decided to place   bilateral 10 cm posterior packs.  This was done.  They were expanded with   saline and Afrin.  2.  Right-sided epistaxis or nasopharyngeal bleeding.  The patient's bleedings   has diminished.  He has been packed with TXA-soaked saturated gauze and was   given intravenous TCA today.  I have packed him bilaterally.  He may need to   have the oral and nasopharyngeal pack replaced through the mouth.  He has   improved, but obviously this is a difficult situation.  I would leave the   packs in a minimum of 4 days.  He should be on Keflex, be on Ancef or Unasyn   for antibiotic coverage while the packs are in.     Thank you very much for this consultation.        ______________________________  MD YUSRA MORENO/DALE    DD:  02/25/2022 16:34  DT:  02/25/2022 18:07    Job#:  [id number]

## 2022-02-27 NOTE — PROGRESS NOTES
[facility] -  PROGRESS NOTE               Author: Caterina Garrett M.D. Date & Time: 2/27/2022  12:09 PM     HPI:  37 year old male with medical H/o Alcohol abuse for the last four years, Obesity and Hypertension is admitted on 02/21 with Acute Upper GI bleed and decompensated cirrhosis. He has been having hematemesis and melena for the last one day and he has not seeked medical attention.  As per notes, patient drinks one pint of vodka daily. No H/o of NSAID's or other anticoagulation. He was admitted to ICU for further management. This morning, patient was intubated as he was desaturating and encephalopathy and also with the need for endoscopy.     He was started on PPI, octreotide, Midodrine and vasopresors for blood support. Labs showed Hb of 9.8, BUN/creatinine of 99/6.28. Ultrasound of abdomen showed no hydronephrosis. Ammonia is elevated at 151. Chest x ray showed pulmonary infiltrates        DAILY NEPHROLOGY SUMMARY:  02/22: Dialysis catheter placed and underwent 2 hours of dialysis.  Endoscopy was done which showed Blanca[place]Villa tear and erosive esophagitis  02/23: On pressors, blood pressure still soft and patient looks grossly swollen.  Receiving the second day of dialysis today.   02/24: Patient is still on pressors and is having continued bleeding. Kidney function looks little better  02/25: Patient still remains intubated and on pressors.  Patient is DNR now after family discussion.   2/26: s/p HD today, on levo , anuric, intubated   2/27: remains intubated, anuric, s/p HD yesterday with increased pressure requirements, today up to 20 mcg of levophed. FIO2 100%.      ROS  Unable to obtain as the patient is intubated.     PMH/PSH/SH/FH:   Reviewed and unchanged since admission note    CURRENT MEDICATIONS:   Reviewed from admission to present day    VS:  /51   Pulse 85   Temp 36.3 °C (97.3 °F) (Bladder)   Resp (!) 30   Ht 1.829 m (6')   Wt (!) 194 kg (427 lb 7.6 oz)    SpO2 92%   BMI 57.98 kg/m²     Physical Exam  Constitutional:       Appearance: He is morbidly obese, anasarca.      Comments: Intubated   HENT:      Head:      Comments: Gross blood noted around the nostrils     Mouth/Throat:      Mouth: Mucous membranes are dry.   Cardiovascular:      Rate and Rhythm: Normal rate and regular rhythm.      Pulses: Normal pulses.      Heart sounds: Normal heart sounds.   Pulmonary:      Effort: Pulmonary effort is normal.      Breath sounds: decreased breath sounds  Abdominal:      General: Bowel sounds are normal.      Palpations: Abdomen is soft.   Musculoskeletal:         General: Swelling present.   Neurological:      Comments: Unable to assess completely   Fluids:  In: 4423.3 [I.V.:3063.3; Other:120; Dialysis:500; Enteral:300]  Out: 4402     LABS:  Recent Labs     02/25/22  0555 02/26/22  0456 02/27/22  0440   SODIUM 134* 134* 134*   POTASSIUM 4.1 3.6 4.1   CHLORIDE 98 94* 93*   CO2 21 23 22   GLUCOSE 120* 114* 102*   BUN 61* 57* 61*   CREATININE 4.28* 5.35* 5.60*   CALCIUM 8.8 8.8 8.7       IMAGING:   All imaging reviewed from admission to present day    IMPRESSION:  # anuric CHI  - Likely 2/2 ATN from hemodynamic instability  - no hydro   - initiated on HD 2/22 via temp HD cath  # h/o HTN but now in Shock 2/2 hypovolemia vs advanced liver cirrhosis   - BP meds on hold   # Acute Upper GI bleeding   - EGD: Blanca Villa and erosive esophagitis  # Decompensated ETOH hepatic cirrhosis  # Acute anemia of bleeding  - no indication for KOLTON   # Coagulopathy with bleeding from diffuse oropharyngeal bleeding   - mouth packed with TXA gauze   - Urethral bleeding minimal due to anuria   - m2/2 cirrhosis +/- DIC   # Hepatic encephalopathy  - intubated   # Acute respiratory failure 2/2 pulmonary edema  # Leukocytosis, per primary   # Acidosis 2/2 renal failure, controlled with HD  # Hypoalbuminemia 2/2 liver cirrhosis  # Hyperphosphatemia      PLAN:  - patient has been getting daily HD to  6 days without improvement of clinical condition and worsening pressor requirements. Now suspect Our approach is futile and it causes prolongation of pt's suffering. Will not do HD tomorrow. Strongly recommend family meeting and re- addressing family expectations.   - Strict I/Os  - Dose all meds per eGFR < 10  - transfuse to keep Hbg >7   - prognosis remains poor.     Discussed with ICU MD.   Thank you.

## 2022-02-27 NOTE — CARE PLAN
The patient is Watcher - Medium risk of patient condition declining or worsening    Shift Goals  Clinical Goals: hemodynamic stability  Patient Goals: KADEN  Family Goals: nont present at this time    Progress made toward(s) clinical / shift goals:    Problem: Pain - Standard  Goal: Alleviation of pain or a reduction in pain to the patient’s comfort goal  Outcome: Progressing   CPOT scale in use to assess for pain/discomfort. Continuous Fentanyl drip in use.     Problem: Skin Integrity  Goal: Skin integrity is maintained or improved  Outcome: Progressing   Q2h turns. Wedges in use for support and positioning.     Problem: Fall Risk  Goal: Patient will remain free from falls  Outcome: Progressing   Fall and safety precautions in place.     Problem: Safety - Medical Restraint  Goal: Remains free of injury from restraints (Restraint for Interference with Medical Device)  Outcome: Progressing   Q2h restraint monitoring in place.     Patient is not progressing towards the following goals:

## 2022-02-27 NOTE — CARE PLAN
Ventilator Daily Summary    Vent Day #6    Ventilator settings: 24, 430, +16, 80%    Plan: Continue current ventilator settings and wean mechanical ventilation as tolerated per physician orders.

## 2022-02-27 NOTE — CARE PLAN
Problem: Skin Integrity  Goal: Skin integrity is maintained or improved  Outcome: Not Progressing     Problem: Pain - Standard  Goal: Alleviation of pain or a reduction in pain to the patient’s comfort goal  Outcome: Progressing     Problem: Fall Risk  Goal: Patient will remain free from falls  Outcome: Progressing   The patient is Unstable - High likelihood or risk of patient condition declining or worsening    Shift Goals  Clinical Goals: hemodynamic stability  Patient Goals: KADEN  Family Goals: nont present at this time    Progress made toward(s) clinical / shift goals:  see above    Patient is not progressing towards the following goals: see above        Problem: Skin Integrity  Goal: Skin integrity is maintained or improved  Outcome: Not Progressing

## 2022-02-27 NOTE — PROGRESS NOTES
Packs in place-no new bleeding today. Hgb stable.  Will consider pack removal on Wednesday.  Continue antibiotics

## 2022-02-27 NOTE — CARE PLAN
Problem: Ventilation  Goal: Ability to achieve and maintain unassisted ventilation or tolerate decreased levels of ventilator support  Description: Target End Date:  4 days     Document on Vent flowsheet    1.  Support and monitor invasive and noninvasive mechanical ventilation  2.  Monitor ventilator weaning response  3.  Perform ventilator associated pneumonia prevention interventions  4.  Manage ventilation therapy by monitoring diagnostic test results  Outcome: Not Progressing      Ventilator Daily Summary    Vent Day # 6    Ventilator settings changed this shift: increase oxygen 100%    Weaning trials: peep 16    Respiratory Procedures: no    Plan: Continue current ventilator settings and wean mechanical ventilation as tolerated per physician orders.

## 2022-02-27 NOTE — PROGRESS NOTES
[facility] Services Progress Note     Hemodialysis treatment ordered today per Dr. KISHAN Garrett x 3 hours.   Pt intubated at 80% fio2 saturating at 92%; on norepinephrine at 20 mcg/min SBP at 110's  Jaundice in color; + edema; RIJ HD non tunneled itact  Treatment initiated at 0948 ended at 1248.      Patient tolerated treatment; no changes in pressor during HD; see paper flow sheet for details.      Net UF 3000 mL.      Post tx, CVC flushed with saline then locked with heparin 1000 units/mL 1.2 ML per designated amount in each wing then clamped and capped. Aspirate heparin prior to next CVC use.     Report given to Primary RN LITTLE Win.

## 2022-02-27 NOTE — PROGRESS NOTES
"Critical Care Progress Note    Date of admission  2/21/2022    Chief Complaint  \"37 y.o. male who presented 2/21/2022 with a past medical history significant for alcohol abuse for the last 4+ years worsening over the last several months after he lost his job as well as obesity and hypertension who has not seen a physician or take any medications for many years.  He is brought in by EMS at the request of his family for worsening confusion, drowsiness, and 3 days of hematemesis with associated melena.  His 15-year-old son who is at bedside and lives with his father, reports that patient has been vomiting approximately 2-3 times per day a moderate amount of blood and is having 1-2 melanotic stools per day worsening over the last 24 hours.  Patient has been refusing to seek medical attention.  The patient reports stopping alcohol approximately 3 weeks ago and normally drinks a pint of vodka per day.  He does not take any nonsteroidal anti-inflammatory drugs nor use any anticoagulation medication.  He denies any prior history of gastrointestinal hemorrhage nor has he been evaluated by a gastroenterologist for liver disease in the past.  He states that he has had decreased urine output over the last 24 hours.  EMS found him with significant blood around him (1 L of bright red blood) and borderline hypotension.  He was evaluated by the emergency physician as well as hospitalist and found to be in multiorgan failure in the setting of GI bleed and requested a consultation for ICU admission and critical care management.  GI was not consulted in the ED as the emergency physicians have been requested to \"not consult to gastroenterology in the middle the night unless an emergent procedure needs to be done.\" - Dr Gonda 2/21/22    Hospital Course  2/21 - admitted overnight to ICU  2/22 - Intubated for HE and need for EGD, dialysis line placed, Nephro/GI consulted.  Vent Day 1  2/23 - Vent Day 2, Dialysis yesterday and planned for " today.  Palliative.  2/24 - VD 3, oropharynx packed with gauze, sedated and paralyzed to tolerate packing.  Began searching for transplant center  2/25 - VD 4, rejected from transplant centers (see plan of care note on 2/24), discussed with ENT utility of NP scope to look for source of NP bleeding  2/26 - Nasopharynx packed by ENT yesterday, ~stable labs.  Some improvement in oxygenation.  Worsening vasopressors today  2/27 - Liberate RASS goal today      Interval Problem Update  Reviewed last 24 hour events:  WBC worsening  Afebrile  Cultures negative so far  Liberating RASS goals to 0/ -1  Continue dialysis  Bilirubin improving  Continue steroids-following alcoholic hepatitis protocol, long course and taper  Escalate to Rocephin  Hgb has been stable, restart dvt prophylaxis and watch bleeding      Review of Systems  Review of Systems   Unable to perform ROS: Intubated        Vital Signs for last 24 hours   Pulse:  [66-92] 86  Resp:  [18-65] 24  BP: ()/(44-69) 114/54  SpO2:  [90 %-96 %] 93 %    Hemodynamic parameters for last 24 hours       Respiratory Information for the last 24 hours  Vent Mode: APVCMV  Rate (breaths/min): 24  Vt Target (mL): 430  PEEP/CPAP: 16  MAP: 20  Control VTE (exp VT): 355    Physical Exam   Physical Exam  Vitals and nursing note reviewed.   Constitutional:       Appearance: He is morbidly obese. He is ill-appearing and toxic-appearing.      Interventions: He is sedated and intubated.   HENT:      Head: Normocephalic.      Nose:      Comments: Now with packing     Mouth/Throat:      Comments: Packing removed from oropharynx  Nasopharynx now packed  Eyes:      General: Scleral icterus present.   Cardiovascular:      Rate and Rhythm: Normal rate and regular rhythm.      Pulses: Normal pulses.   Pulmonary:      Effort: No respiratory distress. He is intubated.      Comments: Diminished breath sounds  Abdominal:      General: Abdomen is protuberant. There is no distension.       Tenderness: There is no abdominal tenderness. There is no guarding or rebound.   Musculoskeletal:         General: No deformity.      Cervical back: Normal range of motion. No rigidity.   Skin:     General: Skin is warm and dry.      Capillary Refill: Capillary refill takes less than 2 seconds.      Coloration: Skin is jaundiced.   Neurological:      Comments: RASS -5         Medications  Current Facility-Administered Medications   Medication Dose Route Frequency Provider Last Rate Last Admin   • cefTRIAXone (Rocephin) syringe 2 g  2 g Intravenous Q24HRS Cody Orellana M.D.   2 g at 02/27/22 0840   • heparin injection 5,000 Units  5,000 Units Subcutaneous Q8HRS Cody Orellana M.D.   5,000 Units at 02/27/22 0840   • propofol (DIPRIVAN) injection  0-80 mcg/kg/min Intravenous Continuous Cody Orellana M.D. 11.6 mL/hr at 02/27/22 1246 10 mcg/kg/min at 02/27/22 1246   • fentaNYL (SUBLIMAZE) 50 mcg/mL in 50mL   Intravenous Continuous Cody Orellana M.D. 2 mL/hr at 02/27/22 1247 100 mcg/hr at 02/27/22 1247   • oxymetazoline (AFRIN) 0.05 % nasal spray 2 Spray  2 Spray Nasal BID Brian Goldstein M.D.   2 [place] at 02/27/22 0600   • prednisoLONE (PRELONE) 15 MG/5ML syrup 39.9 mg  39.9 mg Enteral Tube DAILY Cody Orellana M.D.   39.9 mg at 02/27/22 0552    Followed by   • [START ON 3/24/2022] prednisoLONE (PRELONE) 15 MG/5ML syrup 30 mg  30 mg Enteral Tube DAILY Cody Orellana M.D.        Followed by   • [START ON 3/29/2022] prednisoLONE (PRELONE) 15 MG/5ML syrup 20.1 mg  20.1 mg Enteral Tube DAILY Cody Orellana M.D.        Followed by   • [START ON 4/3/2022] prednisoLONE (PRELONE) 15 MG/5ML syrup 9.9 mg  9.9 mg Enteral Tube DAILY Cody Orellana M.D.        Followed by   • [START ON 4/8/2022] prednisoLONE (PRELONE) 15 MG/5ML syrup 5.1 mg  5.1 mg Enteral Tube DAILY Cody Orellana M.D.       • vasopressin (VASOSTRICT) 20 Units in  mL Infusion  0.03 Units/min Intravenous Continuous Cody Orellana M.D.   Dose not Required at  02/24/22 1000   • lactulose 20 GM/30ML solution 30 mL  30 mL Enteral Tube Q4HRS Cody Orellana M.D.   30 mL at 02/27/22 0931   • nystatin (MYCOSTATIN) powder   Topical TID Jeremy M Gonda, M.D.   Given at 02/27/22 1246   • norepinephrine (Levophed) 8 mg in 250 mL NS infusion (premix)  0-30 mcg/min Intravenous Continuous Cody Orellana M.D. 37.5 mL/hr at 02/27/22 0700 20 mcg/min at 02/27/22 0700   • lidocaine (XYLOCAINE) 1 % injection 2 mL  2 mL Tracheal Tube Q30 MIN PRN Cody Orellana M.D.       • Pharmacy Consult: Enteral tube insertion - review meds/change route/product selection  1 Each Other PHARMACY TO DOSE Cody Orellana M.D.       • [Held by provider] midodrine (PROAMATINE) tablet 10 mg  10 mg Enteral Tube TID WITH MEALS Cody Orellana M.D.   10 mg at 02/25/22 1726   • [Held by provider] riFAXIMin (XIFAXAN) tablet 550 mg  550 mg Enteral Tube BID Cody Orellana M.D.   550 mg at 02/27/22 0552   • heparin injection 2,400 Units  2,400 Units Intracatheter PRN Caterina Garrett M.D.   2,400 Units at 02/27/22 1244   • pantoprazole (Protonix) injection 40 mg  40 mg Intravenous BID Jeremy M Gonda, M.D.   40 mg at 02/27/22 0551   • ondansetron (ZOFRAN) syringe/vial injection 4 mg  4 mg Intravenous Q4HRS PRN Jeremy M Gonda, M.D.           Fluids    Intake/Output Summary (Last 24 hours) at 2/27/2022 1308  Last data filed at 2/27/2022 1246  Gross per 24 hour   Intake 4036.36 ml   Output 4402 ml   Net -365.64 ml       Laboratory  Recent Labs     02/25/22  0411 02/27/22  0507   ISTATAPH 7.314* 7.398*   ISTATAPCO2 49.7* 43.6*   ISTATAPO2 54* 64   ISTATATCO2 27 28   [id number] 85* 92*   ISTATARTHCO3 25.3* 26.9*   ISTATARTBE -1 2   ISTATTEMP 97.0 F 99.0 F   ISTATFIO2 80 80   ISTATSPEC Arterial Arterial   ISTATAPHTC 7.327* 7.395*   [id number] 51* 65         Recent Labs     02/25/22  0555 02/26/22  0456 02/27/22  0440   SODIUM 134* 134* 134*   POTASSIUM 4.1 3.6 4.1   CHLORIDE 98 94* 93*   CO2 21 23 22   BUN 61* 57* 61*   CREATININE  4.28* 5.35* 5.60*   MAGNESIUM 2.5 2.4 2.4   PHOSPHORUS 7.4* 7.2* 8.1*   CALCIUM 8.8 8.8 8.7     Recent Labs     02/25/22  0555 02/26/22  0456 02/27/22  0440   ALTSGPT 70* 97* 75*   ASTSGOT 152* 229* 229*   ALKPHOSPHAT 148* 161* 162*   TBILIRUBIN 24.4* 22.2* 19.2*   PREALBUMIN 8.7*  --   --    GLUCOSE 120* 114* 102*     Recent Labs     02/25/22  0555 02/26/22  0456 02/27/22  0440   WBC 23.2* 34.1* 43.5*   NEUTSPOLYS 82.10* 82.70* 90.50*   LYMPHOCYTES 7.20* 6.40* 3.80*   MONOCYTES 9.20 8.20 4.80   EOSINOPHILS 0.10 0.00 0.00   BASOPHILS 0.20 0.00 0.90   ASTSGOT 152* 229* 229*   ALTSGPT 70* 97* 75*   ALKPHOSPHAT 148* 161* 162*   TBILIRUBIN 24.4* 22.2* 19.2*     Recent Labs     02/25/22  0555 02/26/22  0456 02/27/22  0440   RBC 2.20* 2.29* 2.33*   HEMOGLOBIN 7.8* 8.0* 8.1*   HEMATOCRIT 22.3* 23.0* 23.6*   PLATELETCT 158* 197 240   PROTHROMBTM 19.9* 20.2* 19.2*   INR 1.75* 1.78* 1.67*       Imaging  X-Ray:  I have personally reviewed the images and compared with prior images. and My impression is: no change, low volumes    Assessment/Plan  * GIB (gastrointestinal bleeding)- (present on admission)  Assessment & Plan  [place] and erosive esophagitis  Serial CBC with conservative transfusion strategy    No varices, octreotide did not help HRS, will DC  IV PPI twice daily  No ascites, ok to DC rocephin  Keep n.p.o.    Large bore IV access, has trialysis line now as well    Acute liver failure with hepatic coma (HCC)  Assessment & Plan  This A/P is for transplant evaluation:    Due to alcohol  Last drink ~ 3 weeks prior to admission  Until then drinking ~ 1 pint vodka daily    2/24:  - Meld: 39 today (40 on admission)  - He is now on daily dialysis  - Odin 60 today (69 on admission)    Now coagulopathic, diffuse oropharyngeal bleeding - mouth packed with TXA gauze  Not following commands when sedation is lifted - now RASS -5 with paralysis to tolerate gauze    ON 2/24 I began reaching out to liver transplant centers  to see if he is a candidate for transfer / evaluation  Limiting factors will be weight of 436lbs, and recent alcohol use  Positive factors are a recent demonstration of willingness to stop drinking, strong family support    Afternoon 2/24:  I had the opportunity to speak with transplant at [facility].  They do have an accelerated pathway for transplant evaluation in patients who have alcoholic liver disease.  However it requires the patient to be awake, alert and interactive with transplant psychologists for the proper evaluation.  Given the fact that he had worsening obtundation due to liver failure and is now intubated, sedated and paralyzed to assist oropharyngeal packing, he is not a candidate for transfer at this time.    If we are able to see some improvement, get him extubated and have him able to interact he would be a candidate for evaluation if he still requires it and they have capacity.    We will now terminate the search for transplant center transfer as he will not be a candidate in his current state and has already been rejected from several centers.  If he improves, is extubated and is interactive and still has severe liver disease will recommend reaching back out to [facility] hepatology for consideration of transplant evaluation.    Goals of care, counseling/discussion  Assessment & Plan  I have updated his mom and former spouse at the bedside.  We discussed his continued worsening despite all efforts.  I explained that he was now deeply sedated and paralyzed in order to tolerate the oropharyngeal packing in efforts to tamponade his bleeding.  We also discussed that his bilirubin is worsening and the diffuse bleeding that we are seeing is likely due to his liver failing.  We discussed hepatic encephalopathy and that prior to intubation he was significantly worse than even on arrival.    They describe a recent history where he would try to treat his depression with alcohol.  He had determined to quit  drinking and was trying cleanses at home and had refused to come in for medical evaluation despite their encouragement.  They describe him as a loving but stubborn man.  He is well loved by his family and his children and they request that we continue to do everything possible for him.  I assured them that we would and we would not give up on him.    I did discuss end-of-life matters with them.  I told them that with his liver disease, respiratory and renal failure and now bleeding profusely from his oropharynx if his heart were to stop the utility of CPR would not lead to a meaningful outcome.  I recommended that we do continue to do everything we possibly can to save his life but that if his heart were to stop on its own that we allow him to die naturally and not do CPR.  They agreed to this course so we will make him DNR/I OKAY.    Palliative care has spoken to his family and they and I are greatly appreciative of their help in this difficult time.    Rejected from transplant centers - [facility] is willing to revist the issue if he is extubated and able to participate in conversations    Oropharyngeal bleeding  Assessment & Plan  Oropharynx with some continued bleeding today - will repack with gauze  Nasopharynx packed 2/25 by ENT - Keep in for 4 days    Daily TEG  We will continue to correct coagulopathy as it presents    Stable today    Hepatic encephalopathy (HCC)  Assessment & Plan  Begin lactulose and rifaximin  Close neurologic monitoring    Intubated and sedated now    Flexi-Seal in place with regular output    Acute renal failure with tubular necrosis (HCC)  Assessment & Plan  Urine output very low  Likely HRS unfortunately    Now on daily dialysis - nephrology to re-evaluate needs daily  FiO2 improving    Continuing daily dialysis    Alcohol abuse  Assessment & Plan  Significant alcohol abuse with most recent consumption reported 3 weeks ago  Monitor for alcohol withdrawal syndrome using RASS with as  needed benzodiazepines  Vitamin supplementation  Alcohol cessation education and resources to be provided    Decompensated hepatic cirrhosis (HCC)  Assessment & Plan  Alcoholic cirrhosis decompensated with associated hepatic encephalopathy, metabolic acidosis, acute kidney injury, GI bleed  On arrival: Meld score 40, Madrey 69  Monitor synthetic function  Avoid hepatotoxins  Palliative care consultation, poor prognosis unfortunately    Bili is improving somewhat, will continue prednisolone for full course:  - 40mg x28 days (until 3/22/22 then begin taper which is scheduled to end on 4/11/22)  - I put in a taper for the next 3 weeks as follows: 30mg x5 days, 20mg x5 days, 10mg x5 days, 5mg x5day      Hypotension due to hypovolemia  Assessment & Plan  Now on norepinephrine  Midodrine  Albumin 1g/kg x2 days for potential HRS - Complete    Trend Hgb and transfuse >7    NE requirements now improving  Add vasopressin if required    Repeating TEG & Fibrinogen as needed for coagulopathy management    Class 3 severe obesity due to excess calories without serious comorbidity in adult ([facility])  Assessment & Plan  Encourage behavioral and dietary modification for weight loss  RD consultation    Advancing tube feeds    Acute blood loss anemia  Assessment & Plan  Secondary to gastrointestinal hemorrhage  Now diffuse oropharyngeal bleeding    Packed oropharynx with TXA soaked gauze  TEG & Fibrinogen sent    Serial CBC with conservative transfusion strategy targeting a hemoglobin goal greater than 7    Stable    On mechanically assisted ventilation ([facility])  Assessment & Plan  Intubated date: 2/22/22  Goal saturation > 88%  Monitor ventilator waveforms and titrate flow/peep and volumes according.   Lung protective ventilation strategy  CXR PRN: monitor lung volumes and tube/line placement  VAP bundle prevention, oral care, post pyloric feeding  Head of bed > 30 degree  GI prophylaxis: PPI BID  Daily awakening and SBT trials unless  contraindicated  Assess / Treat pain  Assess / Treat delirium  Sedation Goal: RASS 0 to -1  Monitor for liberation / early mobility  Respiratory treatments: prn  ABCDEF Bundle     Oxygen requirements improved today  Continuing daily dialysis  Now on PEEP 16 FiO2 50% --> 80% today    Leukemoid reaction  Assessment & Plan  Was stable for several days  Now increasing    Blood cultures x2 --> negative so far  Continue antibiotics       VTE:  Heparin  Ulcer: PPI BID for GI Bleed  Lines: Central Line  Ongoing indication addressed and Fernández Catheter  Ongoing indication addressed    I have performed a physical exam and reviewed and updated ROS and Plan today (2/27/2022). In review of yesterday's note (2/26/2022), there are no changes except as documented above.     Discussed patient condition and risk of morbidity and/or mortality with Family, RN, RT, Pharmacy, Code status disscussed, Charge nurse / hot rounds, Patient, GI and nephrology and Transfer Center (looking for transplant centers)     The patient remains critically ill.  He is intubated and on mechanical ventilation.  Critical care time = 40 minutes in directly providing and coordinating critical care and extensive data review.  No time overlap and excludes procedures.

## 2022-02-28 PROBLEM — J96.01 ACUTE RESPIRATORY FAILURE WITH HYPOXIA (HCC): Status: ACTIVE | Noted: 2022-01-01

## 2022-02-28 NOTE — CARE PLAN
The patient is Watcher - Medium risk of patient condition declining or worsening    Shift Goals  Clinical Goals: Hemodynamic and oxygenation stability, improved neuro exam  Patient Goals: Unable to assess  Family Goals: No family present    Progress made toward(s) clinical / shift goals:    Problem: Pain - Standard  Goal: Alleviation of pain or a reduction in pain to the patient’s comfort goal  Outcome: Progressing     Problem: Skin Integrity  Goal: Skin integrity is maintained or improved  Outcome: Progressing     Problem: Fall Risk  Goal: Patient will remain free from falls  Outcome: Progressing     Problem: Safety - Medical Restraint  Goal: Remains free of injury from restraints (Restraint for Interference with Medical Device)  Outcome: Met  Goal: Free from restraint(s) (Restraint for Interference with Medical Device)  Outcome: Met       Patient is not progressing towards the following goals:

## 2022-02-28 NOTE — PROGRESS NOTES
"Critical Care Progress Note    Date of admission  2/21/2022    Chief Complaint  \"37 y.o. male who presented 2/21/2022 with a past medical history significant for alcohol abuse for the last 4+ years worsening over the last several months after he lost his job as well as obesity and hypertension who has not seen a physician or take any medications for many years.  He is brought in by EMS at the request of his family for worsening confusion, drowsiness, and 3 days of hematemesis with associated melena.  His 15-year-old son who is at bedside and lives with his father, reports that patient has been vomiting approximately 2-3 times per day a moderate amount of blood and is having 1-2 melanotic stools per day worsening over the last 24 hours.  Patient has been refusing to seek medical attention.  The patient reports stopping alcohol approximately 3 weeks ago and normally drinks a pint of vodka per day.  He does not take any nonsteroidal anti-inflammatory drugs nor use any anticoagulation medication.  He denies any prior history of gastrointestinal hemorrhage nor has he been evaluated by a gastroenterologist for liver disease in the past.  He states that he has had decreased urine output over the last 24 hours.  EMS found him with significant blood around him (1 L of bright red blood) and borderline hypotension.  He was evaluated by the emergency physician as well as hospitalist and found to be in multiorgan failure in the setting of GI bleed and requested a consultation for ICU admission and critical care management.  GI was not consulted in the ED as the emergency physicians have been requested to \"not consult to gastroenterology in the middle the night unless an emergent procedure needs to be done.\" - Dr Gonda 2/21/22    Hospital Course  2/21 - admitted overnight to ICU  2/22 - Intubated for HE and need for EGD, dialysis line placed, Nephro/GI consulted.  Vent Day 1  2/23 - Vent Day 2, Dialysis yesterday and planned for " today.  Palliative.  2/24 - VD 3, oropharynx packed with gauze, sedated and paralyzed to tolerate packing.  Began searching for transplant center  2/25 - VD 4, rejected from transplant centers (see plan of care note on 2/24), discussed with ENT utility of NP scope to look for source of NP bleeding  2/26 - Nasopharynx packed by ENT yesterday, ~stable labs.  Some improvement in oxygenation.  Worsening vasopressors today  2/27 - Liberate RASS goal today  2/28 - VD7, FiO2 100% and PEEP to 18 with O2 sats at 87%, Levo at 25      Interval Problem Update  Reviewed last 24 hour events:   - propopfol off since 1300 yesterday   - grimaces, not following, does not w/d to pain   - Fentanyl at 100   - SR 80-90s   - SBP 80-120s   - levo at 265   - Tmax 99   - TF at 10cc/hr   - john, anuric   - right IJ TLC   - not mobilizing   - vent day #7   - CXR (reviewed): ETT in proper place, increased cephalization, low lung volumes   - no SBT, PEEP 18 and OiU980%   - heparin   - PPI   - C3   - WBC 45   - RRT daily    Yesterday's Events:  WBC worsening  Afebrile  Cultures negative so far  Liberating RASS goals to 0/ -1  Continue dialysis  Bilirubin improving  Continue steroids-following alcoholic hepatitis protocol, long course and taper  Escalate to Rocephin  Hgb has been stable, restart dvt prophylaxis and watch bleeding      Review of Systems  Review of Systems   Unable to perform ROS: Intubated        Vital Signs for last 24 hours   Pulse:  [] 89  Resp:  [13-65] 19  BP: ()/(41-56) 107/46  SpO2:  [85 %-97 %] 86 %    Hemodynamic parameters for last 24 hours       Respiratory Information for the last 24 hours  Vent Mode: APVCMV  Rate (breaths/min): 24  Vt Target (mL): 430  PEEP/CPAP: 18  MAP: 21  Control VTE (exp VT): 382    Physical Exam   Physical Exam  Vitals and nursing note reviewed.   Constitutional:       Appearance: He is morbidly obese. He is ill-appearing and toxic-appearing.      Interventions: He is sedated and  intubated.   HENT:      Head: Normocephalic.      Right Ear: External ear normal.      Left Ear: External ear normal.      Nose:      Comments: Now with packing     Mouth/Throat:      Mouth: Mucous membranes are moist.      Comments: Packing removed from oropharynx  Nasopharynx now packed  Eyes:      General: Scleral icterus present.   Cardiovascular:      Rate and Rhythm: Normal rate and regular rhythm.      Pulses: Normal pulses.   Pulmonary:      Effort: No respiratory distress. He is intubated.      Comments: Diminished breath sounds  Abdominal:      General: Abdomen is protuberant. There is no distension.      Tenderness: There is no abdominal tenderness. There is no guarding or rebound.   Musculoskeletal:         General: No deformity.      Cervical back: Normal range of motion. No rigidity.      Right lower leg: Edema present.      Left lower leg: Edema present.   Skin:     General: Skin is warm and dry.      Capillary Refill: Capillary refill takes less than 2 seconds.      Coloration: Skin is jaundiced.   Neurological:      Comments: No w/d to pain, grimaces         Medications  Current Facility-Administered Medications   Medication Dose Route Frequency Provider Last Rate Last Admin   • cefTRIAXone (Rocephin) syringe 2 g  2 g Intravenous Q24HRS Cody Orellana M.D.   2 g at 02/28/22 0511   • heparin injection 5,000 Units  5,000 Units Subcutaneous Q8HRS Cody Orellana M.D.   5,000 Units at 02/28/22 0511   • propofol (DIPRIVAN) injection  0-80 mcg/kg/min Intravenous Continuous Cody Orellana M.D.   Stopped at 02/27/22 1325   • fentaNYL (SUBLIMAZE) 50 mcg/mL in 50mL   Intravenous Continuous Cody Orellana M.D. 2 mL/hr at 02/28/22 0334 100 mcg/hr at 02/28/22 0334   • omeprazole (FIRST-OMEPRAZOLE) 2 mg/mL oral susp 40 mg  40 mg Enteral Tube Q12HRS Cody Orellana M.D.   40 mg at 02/28/22 0514   • prednisoLONE (PRELONE) 15 MG/5ML syrup 39.9 mg  39.9 mg Enteral Tube DAILY Cody Orellana M.D.   39.9 mg at 02/28/22  0512    Followed by   • [START ON 3/24/2022] prednisoLONE (PRELONE) 15 MG/5ML syrup 30 mg  30 mg Enteral Tube DAILY Cody Orellana M.D.        Followed by   • [START ON 3/29/2022] prednisoLONE (PRELONE) 15 MG/5ML syrup 20.1 mg  20.1 mg Enteral Tube DAILY Cody Orellana M.D.        Followed by   • [START ON 4/3/2022] prednisoLONE (PRELONE) 15 MG/5ML syrup 9.9 mg  9.9 mg Enteral Tube DAILY Cody Orellana M.D.        Followed by   • [START ON 4/8/2022] prednisoLONE (PRELONE) 15 MG/5ML syrup 5.1 mg  5.1 mg Enteral Tube DAILY Cody Orellana M.D.       • lactulose 20 GM/30ML solution 30 mL  30 mL Enteral Tube Q4HRS Cody Orellana M.D.   30 mL at 02/28/22 0511   • nystatin (MYCOSTATIN) powder   Topical TID Jeremy M Gonda, M.D.   Given at 02/27/22 1814   • norepinephrine (Levophed) 8 mg in 250 mL NS infusion (premix)  0-30 mcg/min Intravenous Continuous Cody Orellana M.D. 46.9 mL/hr at 02/28/22 0428 25 mcg/min at 02/28/22 0428   • lidocaine (XYLOCAINE) 1 % injection 2 mL  2 mL Tracheal Tube Q30 MIN PRN Cody Orellana M.D.       • Pharmacy Consult: Enteral tube insertion - review meds/change route/product selection  1 Each Other PHARMACY TO DOSE Cody Orellana M.D.       • [Held by provider] midodrine (PROAMATINE) tablet 10 mg  10 mg Enteral Tube TID WITH MEALS Cody Orellana M.D.   10 mg at 02/25/22 1726   • [Held by provider] riFAXIMin (XIFAXAN) tablet 550 mg  550 mg Enteral Tube BID Cody Orellana M.D.   550 mg at 02/27/22 0552   • heparin injection 2,400 Units  2,400 Units Intracatheter PRN Caterina Garrett M.D.   2,400 Units at 02/27/22 1244   • ondansetron (ZOFRAN) syringe/vial injection 4 mg  4 mg Intravenous Q4HRS PRN Jeremy M Gonda, M.D.           Fluids    Intake/Output Summary (Last 24 hours) at 2/28/2022 0639  Last data filed at 2/28/2022 0600  Gross per 24 hour   Intake 2845.16 ml   Output 4825 ml   Net -1979.84 ml       Laboratory  Recent Labs     02/27/22  0507 02/28/22  0541   ISTATAPH 7.398* 7.360*   ISTATAPCO2  "43.6* 51.8*   ISTATAPO2 64 59*   ISTATATCO2 28 31   EGTVLBY8RUD 92* 89*   ISTATARTHCO3 26.9* 29.3*   ISTATARTBE 2 3   ISTATTEMP 99.0 F 99.7 F   ISTATFIO2 80 100   ISTATSPEC Arterial Arterial   ISTATAPHTC 7.395* 7.351*   XFEDNHXQ0TM 65 62*         Recent Labs     02/26/22 0456 02/27/22  0440 02/28/22  0435   SODIUM 134* 134* 135   POTASSIUM 3.6 4.1 4.3   CHLORIDE 94* 93* 95*   CO2 23 22 25   BUN 57* 61* 67*   CREATININE 5.35* 5.60* 5.79*   MAGNESIUM 2.4 2.4 2.7*   PHOSPHORUS 7.2* 8.1* 9.5*   CALCIUM 8.8 8.7 8.9     Recent Labs     02/26/22 0456 02/27/22 0440 02/28/22  0435   ALTSGPT 97* 75* 59*   ASTSGOT 229* 229* 261*   ALKPHOSPHAT 161* 162* 161*   TBILIRUBIN 22.2* 19.2* 19.4*   GLUCOSE 114* 102* 111*     Recent Labs     02/26/22 0456 02/27/22 0440 02/28/22  0435   WBC 34.1* 43.5* 45.7*   NEUTSPOLYS 82.70* 90.50* 86.80*   LYMPHOCYTES 6.40* 3.80* 5.30*   MONOCYTES 8.20 4.80 4.40   EOSINOPHILS 0.00 0.00 0.00   BASOPHILS 0.00 0.90 0.00   ASTSGOT 229* 229* 261*   ALTSGPT 97* 75* 59*   ALKPHOSPHAT 161* 162* 161*   TBILIRUBIN 22.2* 19.2* 19.4*     Recent Labs     02/26/22 0456 02/27/22 0440 02/28/22  0435   RBC 2.29* 2.33* 2.37*   HEMOGLOBIN 8.0* 8.1* 8.2*   HEMATOCRIT 23.0* 23.6* 24.0*   PLATELETCT 197 240 286   PROTHROMBTM 20.2* 19.2* 19.0*   INR 1.78* 1.67* 1.65*       Imaging  X-Ray:  I have personally reviewed the images and compared with prior images. and My impression is: no change, low volumes    Assessment/Plan  * GIB (gastrointestinal bleeding)- (present on admission)  Assessment & Plan  Blanca Villa and erosive esophagitis  Serial CBC with conservative transfusion strategy  Cont IV PPI twice daily  Trickle TFs  Unchanged Hb    Acute liver failure with hepatic coma (HCC)  Assessment & Plan  **Patient is not a transplant candidate  \"2/24:  - Meld: 39 today (40 on admission)  - He is now on daily dialysis  - Waynedrey 60 today (69 on admission)    Now coagulopathic, diffuse oropharyngeal bleeding - mouth " "packed with TXA gauze  Not following commands when sedation is lifted - now RASS -5 with paralysis to tolerate gauze    ON 2/24 I began reaching out to liver transplant centers to see if he is a candidate for transfer / evaluation  Limiting factors will be weight of 436lbs, and recent alcohol use  Positive factors are a recent demonstration of willingness to stop drinking, strong family support    Afternoon 2/24:  I had the opportunity to speak with transplant at [facility].  They do have an accelerated pathway for transplant evaluation in patients who have alcoholic liver disease.  However it requires the patient to be awake, alert and interactive with transplant psychologists for the proper evaluation.  Given the fact that he had worsening obtundation due to liver failure and is now intubated, sedated and paralyzed to assist oropharyngeal packing, he is not a candidate for transfer at this time.    If we are able to see some improvement, get him extubated and have him able to interact he would be a candidate for evaluation if he still requires it and they have capacity.    We will now terminate the search for transplant center transfer as he will not be a candidate in his current state and has already been rejected from several centers.  If he improves, is extubated and is interactive and still has severe liver disease will recommend reaching back out to [facility] hepatology for consideration of transplant evaluation.\" --taken from Dr. Orellana's notes    Goals of care, counseling/discussion  Assessment & Plan  \"I have updated his mom and former spouse at the bedside.  We discussed his continued worsening despite all efforts.  I explained that he was now deeply sedated and paralyzed in order to tolerate the oropharyngeal packing in efforts to tamponade his bleeding.  We also discussed that his bilirubin is worsening and the diffuse bleeding that we are seeing is likely due to his liver failing.  We discussed hepatic " "encephalopathy and that prior to intubation he was significantly worse than even on arrival.    They describe a recent history where he would try to treat his depression with alcohol.  He had determined to quit drinking and was trying cleanses at home and had refused to come in for medical evaluation despite their encouragement.  They describe him as a loving but stubborn man.  He is well loved by his family and his children and they request that we continue to do everything possible for him.  I assured them that we would and we would not give up on him.    I did discuss end-of-life matters with them.  I told them that with his liver disease, respiratory and renal failure and now bleeding profusely from his oropharynx if his heart were to stop the utility of CPR would not lead to a meaningful outcome.  I recommended that we do continue to do everything we possibly can to save his life but that if his heart were to stop on its own that we allow him to die naturally and not do CPR.  They agreed to this course so we will make him DNR/I OKAY.    Palliative care has spoken to his family and they and I are greatly appreciative of their help in this difficult time.    Rejected from transplant centers - [place] is willing to revist the issue if he is extubated and able to participate in conversations\" -- from Dr. Orellana's conversations on 2/25-2/27    Oropharyngeal bleeding  Assessment & Plan  Oropharynx with some continued bleeding today - will repack with gauze  Nasopharynx packed 2/25 by ENT - Keep in for 4 days  Daily TEG  We will continue to correct coagulopathy as it presents  Noted no further bleeding    Acute respiratory failure with hypoxia (HCC)  Assessment & Plan  Intubated date: 2/22/22  Goal saturation > 88%  Monitor ventilator waveforms and titrate flow/peep and volumes according.   Lung protective ventilation strategy  CXR PRN: monitor lung volumes and tube/line placement  VAP bundle prevention, oral care, " post pyloric feeding  Head of bed > 30 degree  GI prophylaxis: PPI BID  Daily awakening and SBT trials unless contraindicated  Assess / Treat pain  Assess / Treat delirium  Sedation Goal: RASS 0 to -1  Monitor for liberation / early mobility  Respiratory treatments: prn  ABCDEF Bundle     Oxygen requirements worsened today  HD per nephrology   Now on PEEP 18 FiO2 100%    Leukemoid reaction  Assessment & Plan  Was stable for several days  Continues to be quite elevated at 45 today  Blood cultures x2 --> remain negative  Continue ceftriaxone    Hepatic encephalopathy (HCC)  Assessment & Plan  Lactulose and rifaximin  Follow neuro exam       Hypotension due to hypovolemia  Assessment & Plan  Cont vasopressors for MAP goal > 65  S/p Albumin 1g/kg x2 days for potential HRS - Complete  Trend Hgb and transfuse if < 7      Class 3 severe obesity due to excess calories without serious comorbidity in adult (HCC)  Assessment & Plan  contributing to overall morbidity  Encourage behavioral and dietary modification for weight loss  RD consultation  Advancing tube feeds as tolerated    Acute blood loss anemia  Assessment & Plan  Secondary to gastrointestinal hemorrhage  Now diffuse oropharyngeal bleeding  Packed oropharynx with TXA soaked gauze  Serial CBC with conservative transfusion strategy targeting a hemoglobin goal greater than 7      Acute renal failure with tubular necrosis (HCC)  Assessment & Plan  Likely hepatorenal   Nephrology following  No significant improvement with daily HD  HD per nephrology     Alcohol abuse  Assessment & Plan  Significant alcohol abuse with most recent consumption reported 3 weeks ago  Vitamin supplementation  Alcohol cessation education and resources to be provided if survives    Decompensated hepatic cirrhosis (HCC)  Assessment & Plan  Alcoholic cirrhosis decompensated with associated hepatic encephalopathy, metabolic acidosis, acute kidney injury, GI bleed  On arrival: Meld score 40Marty  69  Monitor synthetic function  Avoid hepatotoxins  Palliative care consultation, poor prognosis unfortunately  Cont steroids: prednisolone 40mg x28 days (until 3/22/22 then begin taper which is scheduled to end on 4/11/22)  **taper placed for the next 3 weeks as follows: 30mg x5 days, 20mg x5 days, 10mg x5 days, 5mg x5day         VTE:  Heparin  Ulcer: PPI BID for GI Bleed  Lines: Central Line  Ongoing indication addressed and Fernández Catheter  Ongoing indication addressed    I have performed a physical exam and reviewed and updated ROS and Plan today (2/28/2022). In review of yesterday's note (2/27/2022), there are no changes except as documented above.     Discussed patient condition and risk of morbidity and/or mortality with Family, RN, RT, Pharmacy, Code status disscussed, Charge nurse / hot rounds, Patient, GI and nephrology and Transfer Center (looking for transplant centers)     The patient remains critically ill.  I have assessed and reassessed the respiratory status and made ventilator adjustments based upon arterial blood gas analysis, ventilator waveforms and airway mechanics.  I have assessed and reassessed the blood pressure, hemodynamics, cardiovascular status. This patient remains at high risk for worsening cardiopulmonary dysfunction and death without the above critical care interventions.    Critical care time = 45 minutes in directly providing and coordinating critical care and extensive data review.  No time overlap and excludes procedures.

## 2022-02-28 NOTE — CARE PLAN
Problem: Skin Integrity  Goal: Skin integrity is maintained or improved  Outcome: Not Progressing     Problem: Pain - Standard  Goal: Alleviation of pain or a reduction in pain to the patient’s comfort goal  2/27/2022 1937 by JORGE MenendezN.  Outcome: Progressing  2/27/2022 0732 by JORGE MenendezN.  Outcome: Progressing     Problem: Fall Risk  Goal: Patient will remain free from falls  2/27/2022 1937 by JORGE MenendezN.  Outcome: Progressing  2/27/2022 0732 by JORGE MenendezN.  Outcome: Progressing   The patient is Unstable - High likelihood or risk of patient condition declining or worsening    Shift Goals  Clinical Goals: hemodynamic stability, improved RASS  Patient Goals: KADEN  Family Goals: none present at this time    Progress made toward(s) clinical / shift goals:  see above    Patient is not progressing towards the following goals: see above        Problem: Skin Integrity  Goal: Skin integrity is maintained or improved  Outcome: Not Progressing

## 2022-02-28 NOTE — PROGRESS NOTES
[facility] -  PROGRESS NOTE               Author: Jeromy Woods M.D. Date & Time: 2/28/2022  12:45 PM     HPI:  37 year old male with medical H/o Alcohol abuse for the last four years, Obesity and Hypertension is admitted on 02/21 with Acute Upper GI bleed and decompensated cirrhosis. He has been having hematemesis and melena for the last one day and he has not seeked medical attention.  As per notes, patient drinks one pint of vodka daily. No H/o of NSAID's or other anticoagulation. He was admitted to ICU for further management. This morning, patient was intubated as he was desaturating and encephalopathy and also with the need for endoscopy.     He was started on PPI, octreotide, Midodrine and vasopresors for blood support. Labs showed Hb of 9.8, BUN/creatinine of 99/6.28. Ultrasound of abdomen showed no hydronephrosis. Ammonia is elevated at 151. Chest x ray showed pulmonary infiltrates        DAILY NEPHROLOGY SUMMARY:  02/22: Dialysis catheter placed and underwent 2 hours of dialysis.  Endoscopy was done which showed Blanca-Villa tear and erosive esophagitis  02/23: On pressors, blood pressure still soft and patient looks grossly swollen.  Receiving the second day of dialysis today.   02/24: Patient is still on pressors and is having continued bleeding. Kidney function looks little better  02/25: Patient still remains intubated and on pressors.  Patient is DNR now after family discussion.   2/26: s/p HD today, on levo , anuric, intubated   2/27: remains intubated, anuric, s/p HD yesterday with increased pressure requirements, today up to 20 mcg of levophed. FIO2 100%.   2/28:  Decreased nasal/pharyngeal bleeding by report     ROS  Unable to obtain as the patient is intubated.     PMH/PSH/SH/FH:   Reviewed and unchanged since admission note    CURRENT MEDICATIONS:   Reviewed from admission to present day    VS:  /49   Pulse 98   Temp 36.3 °C (97.3 °F) (Bladder)   Resp (!)  25   Ht 1.829 m (6')   Wt (!) 192 kg (422 lb 6.4 oz)   SpO2 (!) 85%   BMI 57.29 kg/m²     Physical Exam  Constitutional:       Appearance: He is morbidly obese, anasarca.      Comments: Intubated   HENT:      ETT in place.   Cardiovascular:      Rate and Rhythm: Normal rate and regular rhythm.      Pulses: Normal pulses.      Heart sounds: Normal heart sounds.   Pulmonary:      Effort: Pulmonary effort is normal.      Breath sounds: decreased breath sounds  Abdominal:      General: Bowel sounds are normal.      Palpations: Abdomen is soft.   Musculoskeletal:         General: Swelling present.   Neurological:      Comments: Unable to assess completely   Fluids:  In: 2845.2 [I.V.:1205.2; Other:180; Dialysis:500; Enteral:240]  Out: 4825     LABS:  Recent Labs     02/26/22  0456 02/27/22  0440 02/28/22  0435   SODIUM 134* 134* 135   POTASSIUM 3.6 4.1 4.3   CHLORIDE 94* 93* 95*   CO2 23 22 25   GLUCOSE 114* 102* 111*   BUN 57* 61* 67*   CREATININE 5.35* 5.60* 5.79*   CALCIUM 8.8 8.7 8.9       IMAGING:   All imaging reviewed from admission to present day    IMPRESSION:  # anuric CHI  - Likely 2/2 ATN from hemodynamic instability  - no hydro   - initiated on HD 2/22 via temp HD cath  #hypovolemia vs advanced liver cirrhosis   - on pressors  # Acute Upper GI bleeding   - EGD: Blanca Villa and erosive esophagitis  # Decompensated ETOH hepatic cirrhosis  # Acute anemia of bleeding  - no indication for KOLTON   # Coagulopathy with bleeding from diffuse oropharyngeal bleeding   - mouth packed with TXA gauze   - Urethral bleeding minimal due to anuria   - m2/2 cirrhosis +/- DIC   # Hepatic encephalopathy  - intubated   # Acute respiratory failure 2/2 pulmonary edema-intubated  # Leukocytosis, per primary   # Acidosis 2/2 renal failure, controlled with HD  # Hypoalbuminemia 2/2 liver cirrhosis  # Hyperphosphatemia      PLAN:  - Hold HD today Monday  - vent per ICU  - Dose all meds per eGFR < 10  - transfuse to keep Hbg >7   -  prognosis remains poor.

## 2022-02-28 NOTE — PALLIATIVE CARE
Palliative Care follow-up  PC RN received phone call from pt's BS RN. Per RN, family is requesting meeting on Wednesday afternoon. Will call family Wednesday AM to confirm timing. Please reach out if needs arise prior to Wednesday.         Thank you for allowing Palliative Care to support this patient and family. Contact [phone] for additional assistance, change in patient status, or with any questions/concerns.

## 2022-02-28 NOTE — DIETARY
Nutrition Services: Update   Tube feed running at 30 ml/hour with Impact Peptide, though Peptamen Intense ordered. Propofol off. Levophed at 25 mcg/min. Pt with [facility] on HD and Phosphorus has increased to 9.5 today. Will change tube feed to Renal formula due to increased phosphorus. Monitor labs and reassess to provide increased protein provision when appropriate.    Recommendations/Plan:  1. Change tube feed to Novasource Renal goal rate of 45 ml/hour to provide 2160 kcal, 98 gm protein, 864 mg Phosphorus, and 767 ml free water in 24 hours.  2. Fluids per MD.    RD following

## 2022-03-01 PROBLEM — R65.21 SEVERE SEPSIS WITH SEPTIC SHOCK (CODE) (HCC): Status: ACTIVE | Noted: 2022-01-01

## 2022-03-01 NOTE — PROGRESS NOTES
Patient's mother Evie updated this RN that the patient's family can meet for a family conference to discuss plan/goals of care this Wednesday, March 2nd at 4:15 PM.

## 2022-03-01 NOTE — CARE PLAN
Problem: Nutritional:  Goal: Nutrition support tolerated and meeting greater than 85% of estimated needs  Outcome: Met   Tube feed with Novasource Renal at goal rate of 45 ml/hour.

## 2022-03-01 NOTE — CARE PLAN
Problem: Ventilation  Goal: Ability to achieve and maintain unassisted ventilation or tolerate decreased levels of ventilator support  Description: Target End Date:  4 days     Document on Vent flowsheet    1.  Support and monitor invasive and noninvasive mechanical ventilation  2.  Monitor ventilator weaning response  3.  Perform ventilator associated pneumonia prevention interventions  4.  Manage ventilation therapy by monitoring diagnostic test results  Outcome: Not Met

## 2022-03-01 NOTE — CARE PLAN
The patient is Unstable - High likelihood or risk of patient condition declining or worsening    Shift Goals  Clinical Goals: hemodynamic stability, pain control and management  Patient Goals: unable toassess  Family Goals: no family present    Progress made toward(s) clinical / shift goals:    Problem: Pain - Standard  Goal: Alleviation of pain or a reduction in pain to the patient’s comfort goal  Outcome: Progressing   Continuous Fentanyl drip in use for pain control.      Problem: Skin Integrity  Goal: Skin integrity is maintained or improved  Outcome: Progressing   Q2h turns. Pillows and wedges in use for support and positioning. Z-flow in use. Tubing and equipment repositioned every 2 hours and PRN to prevent unnecessary pressure on patient's skin.     Problem: Fall Risk  Goal: Patient will remain free from falls  Outcome: Progressing   Fall precautions in place. Bed in lowest position and bed brakes applied.     Patient is not progressing towards the following goals: Patient remains hemodynamically unstable requiring vasopressors to meet MAP goals. PEEP and FiO2 settings on ventilator remain high. Lab results reflect worsening hepatic and renal function.

## 2022-03-01 NOTE — PROGRESS NOTES
"Critical Care Progress Note    Date of admission  2/21/2022    Chief Complaint  \"37 y.o. male who presented 2/21/2022 with a past medical history significant for alcohol abuse for the last 4+ years worsening over the last several months after he lost his job as well as obesity and hypertension who has not seen a physician or take any medications for many years.  He is brought in by EMS at the request of his family for worsening confusion, drowsiness, and 3 days of hematemesis with associated melena.  His 15-year-old son who is at bedside and lives with his father, reports that patient has been vomiting approximately 2-3 times per day a moderate amount of blood and is having 1-2 melanotic stools per day worsening over the last 24 hours.  Patient has been refusing to seek medical attention.  The patient reports stopping alcohol approximately 3 weeks ago and normally drinks a pint of vodka per day.  He does not take any nonsteroidal anti-inflammatory drugs nor use any anticoagulation medication.  He denies any prior history of gastrointestinal hemorrhage nor has he been evaluated by a gastroenterologist for liver disease in the past.  He states that he has had decreased urine output over the last 24 hours.  EMS found him with significant blood around him (1 L of bright red blood) and borderline hypotension.  He was evaluated by the emergency physician as well as hospitalist and found to be in multiorgan failure in the setting of GI bleed and requested a consultation for ICU admission and critical care management.  GI was not consulted in the ED as the emergency physicians have been requested to \"not consult to gastroenterology in the middle the night unless an emergent procedure needs to be done.\" - Dr Gonda 2/21/22    Hospital Course  2/21 - admitted overnight to ICU  2/22 - Intubated for HE and need for EGD, dialysis line placed, Nephro/GI consulted.  Vent Day 1  2/23 - Vent Day 2, Dialysis yesterday and planned for " today.  Palliative.  2/24 - VD 3, oropharynx packed with gauze, sedated and paralyzed to tolerate packing.  Began searching for transplant center  2/25 - VD 4, rejected from transplant centers (see plan of care note on 2/24), discussed with ENT utility of NP scope to look for source of NP bleeding  2/26 - Nasopharynx packed by ENT yesterday, ~stable labs.  Some improvement in oxygenation.  Worsening vasopressors today  2/27 - Liberate RASS goal today  2/28 - VD7, FiO2 100% and PEEP to 18 with O2 sats at 87%, Levo at 25      Interval Problem Update  Reviewed last 24 hour events:   - grimaces to pain and suctioning, not w/d   - restlessness    - Fent at 100-200s   - SR/ST 90-100s   - SBP 80-90s   - afebrile   - levo at 40   - vaso 0.03   - anuric   - TFs at 45cc/hr   - not mobilizing   - vent day #8   - CXR(reviewed): low lung volumes, cephalization   - PEEP at 18, FiO2 100%   - holding heparin   - PPI   - C3-->change to zosyn/linezolid   - all cultures negative   - WBCs 43   - Hb 7.3   - Creat 8.1   - K 5   - AST//50   - T bili 18.6   - INR 1.74    Yesterday's Events:   - propopfol off since 1300 yesterday   - grimaces, not following, does not w/d to pain   - Fentanyl at 100   - SR 80-90s   - SBP 80-120s   - levo at 265   - Tmax 99   - TF at 10cc/hr   - john, anuric   - right IJ TLC   - not mobilizing   - vent day #7   - CXR (reviewed): ETT in proper place, increased cephalization, low lung volumes   - no SBT, PEEP 18 and AxU936%   - heparin   - PPI   - C3   - WBC 45   - RRT daily      Review of Systems  Review of Systems   Unable to perform ROS: Intubated        Vital Signs for last 24 hours   Pulse:  [] 89  Resp:  [17-35] 24  BP: ()/(39-63) 94/43  SpO2:  [80 %-95 %] 91 %    Hemodynamic parameters for last 24 hours       Respiratory Information for the last 24 hours  Vent Mode: APVCMV  Rate (breaths/min): 24  Vt Target (mL): 430  PEEP/CPAP: 18  MAP: 22  Control VTE (exp VT): 686    Physical Exam    Physical Exam  Vitals and nursing note reviewed.   Constitutional:       Appearance: He is morbidly obese. He is ill-appearing and toxic-appearing.      Interventions: He is sedated and intubated.   HENT:      Head: Normocephalic.      Right Ear: External ear normal.      Left Ear: External ear normal.      Nose:      Comments: Now with packing     Mouth/Throat:      Mouth: Mucous membranes are moist.      Comments: Packing removed from oropharynx  Nasopharynx now packed  Eyes:      General: Scleral icterus present.   Cardiovascular:      Rate and Rhythm: Normal rate and regular rhythm.      Pulses: Normal pulses.   Pulmonary:      Effort: No respiratory distress. He is intubated.      Comments: Diminished breath sounds  Abdominal:      General: Abdomen is protuberant. There is no distension.      Tenderness: There is no abdominal tenderness. There is no guarding or rebound.   Musculoskeletal:         General: No deformity.      Cervical back: Normal range of motion. No rigidity.      Right lower leg: Edema present.      Left lower leg: Edema present.   Skin:     General: Skin is warm and dry.      Capillary Refill: Capillary refill takes less than 2 seconds.      Coloration: Skin is jaundiced.   Neurological:      Comments: No w/d to pain, grimaces     no change to exam    Medications  Current Facility-Administered Medications   Medication Dose Route Frequency Provider Last Rate Last Admin   • vasopressin (VASOSTRICT) 20 Units in  mL Infusion  0.03 Units/min Intravenous Continuous Alysa Agosto M.D. 9 mL/hr at 03/01/22 0440 0.03 Units/min at 03/01/22 0440   • cefTRIAXone (Rocephin) syringe 2 g  2 g Intravenous Q24HRS Cody Orellana M.D.   2 g at 03/01/22 0548   • heparin injection 5,000 Units  5,000 Units Subcutaneous Q8HRS Cody Orellana M.D.   5,000 Units at 02/28/22 0511   • propofol (DIPRIVAN) injection  0-80 mcg/kg/min Intravenous Continuous Cody Orellana M.D.   Stopped at 02/27/22 1325   •  fentaNYL (SUBLIMAZE) 50 mcg/mL in 50mL   Intravenous Continuous Cody Orellana M.D. 4 mL/hr at 02/28/22 2001 2,500 mcg at 03/01/22 0007   • omeprazole (FIRST-OMEPRAZOLE) 2 mg/mL oral susp 40 mg  40 mg Enteral Tube Q12HRS Cody Orellana M.D.   40 mg at 03/01/22 0551   • prednisoLONE (PRELONE) 15 MG/5ML syrup 39.9 mg  39.9 mg Enteral Tube DAILY Cody Orellana M.D.   39.9 mg at 03/01/22 0548    Followed by   • [START ON 3/24/2022] prednisoLONE (PRELONE) 15 MG/5ML syrup 30 mg  30 mg Enteral Tube DAILY Cody Orellana M.D.        Followed by   • [START ON 3/29/2022] prednisoLONE (PRELONE) 15 MG/5ML syrup 20.1 mg  20.1 mg Enteral Tube DAILY Cody Orellana M.D.        Followed by   • [START ON 4/3/2022] prednisoLONE (PRELONE) 15 MG/5ML syrup 9.9 mg  9.9 mg Enteral Tube DAILY Cody Orellana M.D.        Followed by   • [START ON 4/8/2022] prednisoLONE (PRELONE) 15 MG/5ML syrup 5.1 mg  5.1 mg Enteral Tube DAILY Cody Orellana M.D.       • lactulose 20 GM/30ML solution 30 mL  30 mL Enteral Tube Q4HRS Cody Orellana M.D.   30 mL at 03/01/22 0548   • norepinephrine (Levophed) 8 mg in 250 mL NS infusion (premix)  0-30 mcg/min Intravenous Continuous Cody Orellana M.D. 75 mL/hr at 03/01/22 0500 40 mcg/min at 03/01/22 0500   • lidocaine (XYLOCAINE) 1 % injection 2 mL  2 mL Tracheal Tube Q30 MIN PRN Cody Orellana M.D.       • Pharmacy Consult: Enteral tube insertion - review meds/change route/product selection  1 Each Other PHARMACY TO DOSE Cody Orellana M.D.       • [Held by provider] midodrine (PROAMATINE) tablet 10 mg  10 mg Enteral Tube TID WITH MEALS Cody Orellana M.D.   10 mg at 02/25/22 1726   • [Held by provider] riFAXIMin (XIFAXAN) tablet 550 mg  550 mg Enteral Tube BID Cody Orellana M.D.   550 mg at 02/27/22 0518   • heparin injection 2,400 Units  2,400 Units Intracatheter PRN Caterina Garrett M.D.   2,400 Units at 02/27/22 1244   • ondansetron (ZOFRAN) syringe/vial injection 4 mg  4 mg Intravenous Q4HRS PRN Jeremy M Gonda,  M.D.           Fluids    Intake/Output Summary (Last 24 hours) at 3/1/2022 0616  Last data filed at 3/1/2022 0600  Gross per 24 hour   Intake 2143.77 ml   Output 400 ml   Net 1743.77 ml       Laboratory  Recent Labs     02/27/22  0507 02/28/22  0541   ISTATAPH 7.398* 7.360*   ISTATAPCO2 43.6* 51.8*   ISTATAPO2 64 59*   ISTATATCO2 28 31   NOGGICK5OIS 92* 89*   ISTATARTHCO3 26.9* 29.3*   ISTATARTBE 2 3   ISTATTEMP 99.0 F 99.7 F   ISTATFIO2 80 100   ISTATSPEC Arterial Arterial   ISTATAPHTC 7.395* 7.351*   VPXAVGTE9PI 65 62*         Recent Labs     02/27/22 0440 02/28/22  0435 03/01/22  0430   SODIUM 134* 135 136   POTASSIUM 4.1 4.3 5.0   CHLORIDE 93* 95* 96   CO2 22 25 23   BUN 61* 67* 102*   CREATININE 5.60* 5.79* 8.11*   MAGNESIUM 2.4 2.7* 3.1*   PHOSPHORUS 8.1* 9.5* 10.5*   CALCIUM 8.7 8.9 8.1*     Recent Labs     02/27/22  0440 02/28/22  0435 02/28/22  1720 03/01/22  0430   ALTSGPT 75* 59*  --  50   ASTSGOT 229* 261*  --  288*   ALKPHOSPHAT 162* 161*  --  158*   TBILIRUBIN 19.2* 19.4*  --  18.6*   PREALBUMIN  --   --  5.8*  --    GLUCOSE 102* 111*  --  131*     Recent Labs     02/27/22  0440 02/28/22  0435 03/01/22  0430   WBC 43.5* 45.7* 42.7*   NEUTSPOLYS 90.50* 86.80* 72.90*   LYMPHOCYTES 3.80* 5.30* 6.80*   MONOCYTES 4.80 4.40 13.80*   EOSINOPHILS 0.00 0.00 0.00   BASOPHILS 0.90 0.00 0.30   ASTSGOT 229* 261* 288*   ALTSGPT 75* 59* 50   ALKPHOSPHAT 162* 161* 158*   TBILIRUBIN 19.2* 19.4* 18.6*     Recent Labs     02/27/22  0440 02/28/22  0435 03/01/22  0430   RBC 2.33* 2.37* 2.11*   HEMOGLOBIN 8.1* 8.2* 7.3*   HEMATOCRIT 23.6* 24.0* 21.8*   PLATELETCT 240 286 300   PROTHROMBTM 19.2* 19.0* 19.8*   INR 1.67* 1.65* 1.74*       Imaging  X-Ray:  I have personally reviewed the images and compared with prior images. and My impression is: no change, low volumes    Assessment/Plan  * GIB (gastrointestinal bleeding)- (present on admission)  Assessment & Plan  Blanca Villa and erosive esophagitis  Serial CBC with  "conservative transfusion strategy  Cont IV PPI twice daily  TFs advanced  Hb at 7.3 today    Acute liver failure with hepatic coma (HCC)  Assessment & Plan  **Patient is not a transplant candidate  \"2/24:  - Meld: 39 today (40 on admission)  - He is now on daily dialysis  - Maddrey 60 today (69 on admission)    Now coagulopathic, diffuse oropharyngeal bleeding - mouth packed with TXA gauze  Not following commands when sedation is lifted - now RASS -5 with paralysis to tolerate gauze    ON 2/24 I began reaching out to liver transplant centers to see if he is a candidate for transfer / evaluation  Limiting factors will be weight of 436lbs, and recent alcohol use  Positive factors are a recent demonstration of willingness to stop drinking, strong family support    Afternoon 2/24:  I had the opportunity to speak with transplant at [facility].  They do have an accelerated pathway for transplant evaluation in patients who have alcoholic liver disease.  However it requires the patient to be awake, alert and interactive with transplant psychologists for the proper evaluation.  Given the fact that he had worsening obtundation due to liver failure and is now intubated, sedated and paralyzed to assist oropharyngeal packing, he is not a candidate for transfer at this time.    If we are able to see some improvement, get him extubated and have him able to interact he would be a candidate for evaluation if he still requires it and they have capacity.    We will now terminate the search for transplant center transfer as he will not be a candidate in his current state and has already been rejected from several centers.  If he improves, is extubated and is interactive and still has severe liver disease will recommend reaching back out to [facility] for consideration of transplant evaluation.\" --taken from Dr. Orellana's notes    Goals of care, counseling/discussion  Assessment & Plan  \"I have updated his mom and former spouse at " "the bedside.  We discussed his continued worsening despite all efforts.  I explained that he was now deeply sedated and paralyzed in order to tolerate the oropharyngeal packing in efforts to tamponade his bleeding.  We also discussed that his bilirubin is worsening and the diffuse bleeding that we are seeing is likely due to his liver failing.  We discussed hepatic encephalopathy and that prior to intubation he was significantly worse than even on arrival.    They describe a recent history where he would try to treat his depression with alcohol.  He had determined to quit drinking and was trying cleanses at home and had refused to come in for medical evaluation despite their encouragement.  They describe him as a loving but stubborn man.  He is well loved by his family and his children and they request that we continue to do everything possible for him.  I assured them that we would and we would not give up on him.    I did discuss end-of-life matters with them.  I told them that with his liver disease, respiratory and renal failure and now bleeding profusely from his oropharynx if his heart were to stop the utility of CPR would not lead to a meaningful outcome.  I recommended that we do continue to do everything we possibly can to save his life but that if his heart were to stop on its own that we allow him to die naturally and not do CPR.  They agreed to this course so we will make him DNR/I OKAY.    Palliative care has spoken to his family and they and I are greatly appreciative of their help in this difficult time.    Rejected from transplant centers - [facility] is willing to revist the issue if he is extubated and able to participate in conversations\" -- from Dr. Orellana's conversations on 2/25-2/27    Oropharyngeal bleeding  Assessment & Plan  Nasopharynx packed 2/25 by ENT -->removed today  Daily TEG  We will continue to correct coagulopathy as it presents  Noted no further bleeding    Acute respiratory failure " with hypoxia (HCC)  Assessment & Plan  Intubated date: 2/22/22  Goal saturation > 88%  Monitor ventilator waveforms and titrate flow/peep and volumes according.   Lung protective ventilation strategy  CXR PRN: monitor lung volumes and tube/line placement  VAP bundle prevention, oral care, post pyloric feeding  Head of bed > 30 degree  GI prophylaxis: PPI BID  Daily awakening and SBT trials unless contraindicated  Assess / Treat pain  Assess / Treat delirium  Sedation Goal: RASS 0 to -1  Monitor for liberation / early mobility  Respiratory treatments: prn  ABCDEF Bundle     Oxygen requirements remains significantly high  HD per nephrology   Now on PEEP 18 FiO2 100%<-->90%    Leukemoid reaction  Assessment & Plan  Was stable for several days  Continues to be quite elevated at 45 today  Blood cultures x2 --> all negative  Continue Abx-->change ceftriaxone to Zosyn    Hepatic encephalopathy (HCC)  Assessment & Plan  Lactulose and rifaximin  Follow neuro exam       Severe sepsis with septic shock (CODE) (HCC)  Assessment & Plan  Continues to worsen  No significant improvement after albumin  Worsening shock on higher vasopressor needs with levophed and vasopressin  Cultures remain negative  Expand abx from C3 to Zosyn today  Trend Hgb and transfuse if < 7      Class 3 severe obesity due to excess calories without serious comorbidity in adult (HCC)  Assessment & Plan  contributing to overall morbidity  Encourage behavioral and dietary modification for weight loss  RD consultation  Advancing tube feeds as tolerated    Acute blood loss anemia  Assessment & Plan  Secondary to gastrointestinal hemorrhage  Now diffuse oropharyngeal bleeding  Packed oropharynx with TXA soaked gauze  Serial CBC with conservative transfusion strategy targeting a hemoglobin goal greater than 7      Acute renal failure with tubular necrosis (HCC)  Assessment & Plan  Likely hepatorenal   Nephrology following  No significant improvement with daily  HD  HD per nephrology     Alcohol abuse  Assessment & Plan  Significant alcohol abuse with most recent consumption reported 3 weeks ago  Vitamin supplementation  Alcohol cessation education and resources to be provided if survives.    Decompensated hepatic cirrhosis (HCC)  Assessment & Plan  Alcoholic cirrhosis decompensated with associated hepatic encephalopathy, metabolic acidosis, acute kidney injury, GI bleed  On arrival: Meld score 40, Madrey 69  Monitor synthetic function  Avoid hepatotoxins  Palliative care assisting, poor prognosis unfortunately  Cont steroids: prednisolone 40mg x28 days (until 3/22/22 then begin taper which is scheduled to end on 4/11/22)  **taper placed for the next 3 weeks as follows: 30mg x 5 days, 20mg x 5 days, 10mg x 5 days, 5mg x 5 days  No significant change in overall status         VTE:  Heparin  Ulcer: PPI BID for GI Bleed  Lines: Central Line  Ongoing indication addressed and Fernández Catheter  Ongoing indication addressed    I have performed a physical exam and reviewed and updated ROS and Plan today (3/1/2022). In review of yesterday's note (2/28/2022), there are no changes except as documented above.     Discussed patient condition and risk of morbidity and/or mortality with Family, RN, RT, Pharmacy, Code status disscussed, Charge nurse / hot rounds, Patient, GI and nephrology and Transfer Center (looking for transplant centers)     The patient remains critically ill.  I have assessed and reassessed the respiratory status and made ventilator adjustments based upon arterial blood gas analysis, ventilator waveforms and airway mechanics.  I have assessed and reassessed the blood pressure, hemodynamics, cardiovascular status. This patient remains at high risk for worsening cardiopulmonary dysfunction and death without the above critical care interventions.    Critical care time = 88 minutes in directly providing and coordinating critical care and extensive data review.  No time  overlap and excludes procedures.

## 2022-03-01 NOTE — PROGRESS NOTES
Patient transitioning to comfort care probably tomorrow.  As such will leave packs in as do not want bleeding.  Continue antibiotics.

## 2022-03-01 NOTE — CARE PLAN
Problem: Ventilation  Goal: Ability to achieve and maintain unassisted ventilation or tolerate decreased levels of ventilator support  Description: Target End Date:  4 days     Document on Vent flowsheet    1.  Support and monitor invasive and noninvasive mechanical ventilation  2.  Monitor ventilator weaning response  3.  Perform ventilator associated pneumonia prevention interventions  4.  Manage ventilation therapy by monitoring diagnostic test results  Outcome: Not Progressing  Note:    Ventilator Daily Summary    Vent Day # 8  8.0@26t  APCMV 24/430/+18/90%    Ventilator settings changed this shift: FiO2 down to 90%    Weaning trials: none    Respiratory Procedures: none    Plan: Continue current ventilator settings and wean mechanical ventilation as tolerated per physician orders.

## 2022-03-01 NOTE — PROGRESS NOTES
[facility] -  PROGRESS NOTE               Author: Jeromy Woods M.D. Date & Time: 3/1/2022  1:13 PM     HPI:  37 year old male with medical H/o Alcohol abuse for the last four years, Obesity and Hypertension is admitted on 02/21 with Acute Upper GI bleed and decompensated cirrhosis. He has been having hematemesis and melena for the last one day and he has not seeked medical attention.  As per notes, patient drinks one pint of vodka daily. No H/o of NSAID's or other anticoagulation. He was admitted to ICU for further management. This morning, patient was intubated as he was desaturating and encephalopathy and also with the need for endoscopy.     He was started on PPI, octreotide, Midodrine and vasopresors for blood support. Labs showed Hb of 9.8, BUN/creatinine of 99/6.28. Ultrasound of abdomen showed no hydronephrosis. Ammonia is elevated at 151. Chest x ray showed pulmonary infiltrates        DAILY NEPHROLOGY SUMMARY:  02/22: Dialysis catheter placed and underwent 2 hours of dialysis.  Endoscopy was done which showed Blanca-Villa tear and erosive esophagitis  02/23: On pressors, blood pressure still soft and patient looks grossly swollen.  Receiving the second day of dialysis today.   02/24: Patient is still on pressors and is having continued bleeding. Kidney function looks little better  02/25: Patient still remains intubated and on pressors.  Patient is DNR now after family discussion.   2/26: s/p HD today, on levo , anuric, intubated   2/27: remains intubated, anuric, s/p HD yesterday with increased pressure requirements, today up to 20 mcg of levophed. FIO2 100%.   2/28:  Decreased nasal/pharyngeal bleeding by report  3/1: No changes to current condition,      ROS  Unable to obtain as the patient is intubated.     PMH/PSH/SH/FH:   Reviewed and unchanged since admission note    CURRENT MEDICATIONS:   Reviewed from admission to present day    VS:  /50   Pulse (!) 103    Temp 36.3 °C (97.3 °F) (Bladder)   Resp (!) 10   Ht 1.829 m (6')   Wt (!) 192 kg (422 lb 6.4 oz)   SpO2 96%   BMI 57.29 kg/m²     Physical Exam  Constitutional:       Appearance: He is morbidly obese, anasarca.      Comments: Intubated   HENT:      ETT in place.   Cardiovascular:      Rate and Rhythm: Normal rate and regular rhythm.      Pulses: Normal pulses.      Heart sounds: Normal heart sounds.   Pulmonary:      Effort: Pulmonary effort is normal.      Breath sounds: decreased breath sounds  Abdominal:      General: Bowel sounds are normal.      Palpations: Abdomen is soft.   Musculoskeletal:         General: Swelling present.   Neurological:      Comments: Unable to assess completely   Fluids:  In: 2683.7 [I.V.:1403.7; Other:60; Enteral:210]  Out: 700     LABS:  Recent Labs     02/27/22  0440 02/28/22  0435 03/01/22  0430   SODIUM 134* 135 136   POTASSIUM 4.1 4.3 5.0   CHLORIDE 93* 95* 96   CO2 22 25 23   GLUCOSE 102* 111* 131*   BUN 61* 67* 102*   CREATININE 5.60* 5.79* 8.11*   CALCIUM 8.7 8.9 8.1*       IMAGING:   All imaging reviewed from admission to present day    IMPRESSION:  # anuric CHI  - Likely 2/2 ATN from hemodynamic instability  - no hydro   - initiated on HD 2/22 via temp HD cath  #hypovolemia vs advanced liver cirrhosis   - on pressors  # Acute Upper GI bleeding   - EGD: Blanca Villa and erosive esophagitis  # Decompensated ETOH hepatic cirrhosis  # Acute anemia of bleeding  - no indication for KOLTON   # Coagulopathy with bleeding from diffuse oropharyngeal bleeding   - mouth packed with TXA gauze   - Urethral bleeding minimal due to anuria   - m2/2 cirrhosis +/- DIC   # Hepatic encephalopathy  - intubated   # Acute respiratory failure 2/2 pulmonary edema-intubated  # Leukocytosis, per primary   # Acidosis 2/2 renal failure, controlled with HD  # Hypoalbuminemia 2/2 liver cirrhosis  # Hyperphosphatemia      PLAN:  - No additional dialysis planned  -probable comofrt cares in am  -will sign  off

## 2022-03-01 NOTE — CARE PLAN
Problem: Pain - Standard  Goal: Alleviation of pain or a reduction in pain to the patient’s comfort goal  Outcome: Progressing     Problem: Knowledge Deficit - Standard  Goal: Patient and family/care givers will demonstrate understanding of plan of care, disease process/condition, diagnostic tests and medications  Outcome: Progressing     Problem: Skin Integrity  Goal: Skin integrity is maintained or improved  Outcome: Progressing     Problem: Fall Risk  Goal: Patient will remain free from falls  Outcome: Progressing   The patient is Unstable - High likelihood or risk of patient condition declining or worsening    Shift Goals  Clinical Goals: hemodynamic stability, pain control and management  Patient Goals: unable toassess  Family Goals: no family present

## 2022-03-01 NOTE — PROGRESS NOTES
No severe bleeding from nose-blood tinged mucous from mouth and ET tube apparently-not there yesterday..  Hopefully can get at least the left pack out tomorrow. Talked to nurse about stopping the heparin before pack removal-it may be stopped in any case as he has had some blood tinged secretions.

## 2022-03-01 NOTE — PROGRESS NOTES
Pulmonary/Critical Care Medicine   Progress Note    Date of service: 3/1/2022  Time: 12:10    I met with the patient's mother and sister with the patient's grandmother on the phone.  I explained that despite our best efforts to get Iftikhar through this episode of alcoholic hepatitis that he is still declining requiring maximum O2 and ventilator settings along with maximum vasopressor support.  The family does not want Iftikhar to suffer any longer.  They are awaiting the patient's father, but planning to transition to comfort care in the next 24 hours.  For now, we agreed to not escalate care and continue vasopressors that are ordered (but no additional) and we will liberalize the use of Fentanyl.    Alysa Agosto MD  Pulmonary and Critical Care Medicine

## 2022-03-02 LAB — VIT C SERPL-MCNC: 56 UMOL/L (ref 23–114)

## 2022-03-02 NOTE — PROGRESS NOTES
Patient transitioned to comfort care at 1430. Bereavement tray ordered for patient's family. Medications administered to keep patient comfortable per comfort care protocol. Patient extubated and taken off ICU monitor per family's request at 1715. Emotional support provided to family members. 2 RN death pronunciation completed, time of death pronounced at 1730. [facility] and [facility] notified. Post-mortem care provided. Patient's personal belongings sent home with patient's sister.

## 2022-03-02 NOTE — DISCHARGE SUMMARY
Death Summary    Cause of Death  Acute hypoxic respiratory failure due to septic shock due to decompensate cirrhosis due to chronic alcohol abuse.    Comorbid Conditions at the Time of Death  Principal Problem:    GIB (gastrointestinal bleeding) POA: Yes  Active Problems:    Decompensated hepatic cirrhosis (HCC) POA: Unknown    Alcohol abuse POA: Unknown    Acute renal failure with tubular necrosis (HCC) POA: Unknown    Acute blood loss anemia POA: Unknown    Class 3 severe obesity due to excess calories without serious comorbidity in adult (HCC) POA: Unknown    Severe sepsis with septic shock (CODE) (HCC) POA: Unknown    Hepatic encephalopathy (HCC) POA: Unknown    Leukemoid reaction POA: Unknown    Hypocalcemia POA: Unknown    Acute respiratory failure with hypoxia (HCC) POA: Unknown    Oropharyngeal bleeding POA: Unknown    Goals of care, counseling/discussion POA: Unknown    Acute liver failure with hepatic coma (HCC) POA: Unknown  Resolved Problems:    * No resolved hospital problems. *      History of Presenting Illness and Hospital Course  Mr. Gordon is a 37 year old male with the past medical history of chronic alcohol abuse, morbid obesity with a BMI of 57, and hypertension who was admitted on 2/21/2022 for worsening confusion, drowsiness, and 3 days of hematemesis with associated melena.  He had a MELD score of 40. The patient required intubation due to continued hematemesis and need for EGD.  A dialysis catheter was placed due to acute kidney injury associated with acute liver failure.  The EGD revealed a Blanca-Villa tear and erosive esophagitis that was treated with IV Protonix twice daily.  The critical care team reached out multiple transplant centers on 2/24 however the patient was not a candidate due to the patient continuing to drink alcohol and his morbid obesity.  Over the next few days the patient had daily dialysis without change in his mental status and shock.  Family met with palliative care  and the decision to make the patient a DNR was made.  The course of the next 48 hours the patient continued to deteriorate requiring higher vasopressor needs as well as higher oxygen needs.  In the afternoon of 3/1 a family meeting with the intensivist was done in the family wanted to transition to comfort care with compassionate extubation due to the patient's continued suffering.  Multiple family members were at his bedside upon transitioning to comfort measures and the patient passed peacefully at 1730.    Death Date: 03/01/22   Death Time: 1730         Pronounced By (RN1): Gisela Win  Pronounced By (RN2): Armond Pickens

## 2022-03-03 LAB
BACTERIA BLD CULT: NORMAL
BACTERIA BLD CULT: NORMAL
SIGNIFICANT IND 70042: NORMAL
SIGNIFICANT IND 70042: NORMAL
SITE SITE: NORMAL
SITE SITE: NORMAL
SOURCE SOURCE: NORMAL
SOURCE SOURCE: NORMAL

## 2022-03-14 NOTE — DOCUMENTATION QUERY
[facility]                                                                       Query Response Note      PATIENT:               LINA BEVERLY  ACCT #:                  [id number]  MRN:                     [id number]  :                      1985  ADMIT DATE:       2022 8:39 PM  DISCH DATE:        3/1/2022 5:30 PM  RESPONDING  PROVIDER #:        [id number]           QUERY TEXT:    Shock is documented in the Medical Record.  Please clarify the following:    NOTE:  If an appropriate response is not listed below, please respond with a new note.    The patient's Clinical Indicators include:  Clinical indicators  SBP <90, lactic acid 2.1    Consult - Admit: GIB  Hypotension due to hypovolemia       PN -  Shock  hypovolemia vs advanced liver cirrhosis   HD, UF as tolerated with pressor support , midodrine and albumin     PN 3/1- Severe sepsis with septic shock   Worsening shock on higher vasopressor needs with levophed and vasopressin  Expand abx from C3 to Zosyn   HEMOGLOBIN- 7.3      DC- Acute hypoxic respiratory failure due to septic shock due to decompensated cirrhosis due to chronic alcohol abuse    Treatment or Monitoring  IV fluid resuscitation  vasopressor  transfusion    Risk Factors  alcoholism, hypotension      [email or web address]  Options provided:   -- Septic shock   -- Hypovolemic shock   -- Septic shock and hypovolemic shock   -- Unable to determine      Query created by: Camron Stewart on 3/14/2022 6:14 AM    RESPONSE TEXT:    Septic shock and hypovolemic shock          Electronically signed by:  ANGELY HILARIO MD 3/14/2022 7:03 AM

## 2022-04-04 NOTE — DOCUMENTATION QUERY
[facility]                                                                       Query Response Note      PATIENT:               LINA BEVERLY  ACCT #:                  [id number]  MRN:                     [id number]  :                      1985  ADMIT DATE:       2022 8:39 PM  DISCH DATE:        3/1/2022 5:30 PM  RESPONDING  PROVIDER #:        [id number]           QUERY TEXT:    It is unclear whether Sepsis and septic shock were present at the time of admission.  Your help is needed.  Please clarify the POA status.     NOTE:  If an appropriate response is not listed below, please respond with a new note.        The patient's Clinical Indicators include:  Clinical indicators  ED:  SBP <90, resp 27, WBC 23    Consult - Admit: GIB. Hypotension due to hypovolemia  BP: 91/45  Resp: 42  WBC: 23.2    PN 3/1- Severe sepsis with septic shock. Worsening shock on higher vasopressor needs with levophed and vasopressin. Expand abx from C3 to Zosyn today      Treatment or Monitoring  IV fluid resuscitation  vasopressor  C3   Zosyn     Risk Factors  alcoholism  hypotension      [email or web address]  Options provided:   -- Sepsis and septic shock present at the time of inpatient admission   -- Sepsis and septic shock was not present at the time of inpatient admission   -- Sepsis present at time of admission, with septic shock developing after admit   -- The provider is unable to clinically determine whether condition of Sepsis and septic shock was present at the time of admit or not      Query created by: Camron Stewart on 3/29/2022 1:15 PM    RESPONSE TEXT:    Sepsis and septic shock was not present at the time of inpatient admission          Electronically signed by:  JEREMY M GONDA MD 2022 6:20 AM

## 2022-06-18 NOTE — ASSESSMENT & PLAN NOTE
Repletion with IV calcium gluconate  Monitor   Finasteride Pregnancy And Lactation Text: This medication is absolutely contraindicated during pregnancy. It is unknown if it is excreted in breast milk.

## (undated) DEVICE — TUBE CONNECTING SUCTION - CLEAR PLASTIC STERILE 72 IN (50EA/CA)

## (undated) DEVICE — SET EXTENSION WITH 2 PORTS (48EA/CA) ***PART #2C8610 IS A SUBSTITUTE*****

## (undated) DEVICE — KIT CUSTOM PROCEDURE SINGLE FOR ENDO  (15/CA)